# Patient Record
Sex: FEMALE | Race: WHITE | NOT HISPANIC OR LATINO | Employment: OTHER | URBAN - METROPOLITAN AREA
[De-identification: names, ages, dates, MRNs, and addresses within clinical notes are randomized per-mention and may not be internally consistent; named-entity substitution may affect disease eponyms.]

---

## 2019-05-28 DIAGNOSIS — Z01.812 PRE-OPERATIVE LABORATORY EXAMINATION: ICD-10-CM

## 2019-05-28 DIAGNOSIS — Z01.810 PRE-OPERATIVE CARDIOVASCULAR EXAMINATION: ICD-10-CM

## 2019-05-28 LAB
ANION GAP SERPL CALC-SCNC: 8 MMOL/L (ref 0–11.9)
APPEARANCE UR: CLEAR
APTT PPP: 26.7 SEC (ref 24.7–36)
BASOPHILS # BLD AUTO: 0.5 % (ref 0–1.8)
BASOPHILS # BLD: 0.04 K/UL (ref 0–0.12)
BILIRUB UR QL STRIP.AUTO: NEGATIVE
BUN SERPL-MCNC: 24 MG/DL (ref 8–22)
CALCIUM SERPL-MCNC: 9.4 MG/DL (ref 8.5–10.5)
CHLORIDE SERPL-SCNC: 108 MMOL/L (ref 96–112)
CO2 SERPL-SCNC: 27 MMOL/L (ref 20–33)
COLOR UR: YELLOW
CREAT SERPL-MCNC: 0.9 MG/DL (ref 0.5–1.4)
EKG IMPRESSION: NORMAL
EOSINOPHIL # BLD AUTO: 0.16 K/UL (ref 0–0.51)
EOSINOPHIL NFR BLD: 2.2 % (ref 0–6.9)
ERYTHROCYTE [DISTWIDTH] IN BLOOD BY AUTOMATED COUNT: 46.6 FL (ref 35.9–50)
GLUCOSE SERPL-MCNC: 101 MG/DL (ref 65–99)
GLUCOSE UR STRIP.AUTO-MCNC: NEGATIVE MG/DL
HCT VFR BLD AUTO: 36.5 % (ref 37–47)
HGB BLD-MCNC: 11.4 G/DL (ref 12–16)
IMM GRANULOCYTES # BLD AUTO: 0.03 K/UL (ref 0–0.11)
IMM GRANULOCYTES NFR BLD AUTO: 0.4 % (ref 0–0.9)
INR PPP: 0.93 (ref 0.87–1.13)
KETONES UR STRIP.AUTO-MCNC: NEGATIVE MG/DL
LEUKOCYTE ESTERASE UR QL STRIP.AUTO: NEGATIVE
LYMPHOCYTES # BLD AUTO: 1.43 K/UL (ref 1–4.8)
LYMPHOCYTES NFR BLD: 19.5 % (ref 22–41)
MCH RBC QN AUTO: 31.3 PG (ref 27–33)
MCHC RBC AUTO-ENTMCNC: 31.2 G/DL (ref 33.6–35)
MCV RBC AUTO: 100.3 FL (ref 81.4–97.8)
MICRO URNS: NORMAL
MONOCYTES # BLD AUTO: 0.4 K/UL (ref 0–0.85)
MONOCYTES NFR BLD AUTO: 5.5 % (ref 0–13.4)
NEUTROPHILS # BLD AUTO: 5.26 K/UL (ref 2–7.15)
NEUTROPHILS NFR BLD: 71.9 % (ref 44–72)
NITRITE UR QL STRIP.AUTO: NEGATIVE
NRBC # BLD AUTO: 0 K/UL
NRBC BLD-RTO: 0 /100 WBC
PH UR STRIP.AUTO: 5.5 [PH]
PLATELET # BLD AUTO: 365 K/UL (ref 164–446)
PMV BLD AUTO: 9 FL (ref 9–12.9)
POTASSIUM SERPL-SCNC: 4.1 MMOL/L (ref 3.6–5.5)
PROT UR QL STRIP: NEGATIVE MG/DL
PROTHROMBIN TIME: 12.6 SEC (ref 12–14.6)
RBC # BLD AUTO: 3.64 M/UL (ref 4.2–5.4)
RBC UR QL AUTO: NEGATIVE
SODIUM SERPL-SCNC: 143 MMOL/L (ref 135–145)
SP GR UR STRIP.AUTO: 1.02
UROBILINOGEN UR STRIP.AUTO-MCNC: 0.2 MG/DL
WBC # BLD AUTO: 7.3 K/UL (ref 4.8–10.8)

## 2019-05-28 PROCEDURE — 93005 ELECTROCARDIOGRAM TRACING: CPT

## 2019-05-28 PROCEDURE — 85610 PROTHROMBIN TIME: CPT

## 2019-05-28 PROCEDURE — 85730 THROMBOPLASTIN TIME PARTIAL: CPT

## 2019-05-28 PROCEDURE — 85025 COMPLETE CBC W/AUTO DIFF WBC: CPT

## 2019-05-28 PROCEDURE — 81003 URINALYSIS AUTO W/O SCOPE: CPT

## 2019-05-28 PROCEDURE — 93010 ELECTROCARDIOGRAM REPORT: CPT | Performed by: INTERNAL MEDICINE

## 2019-05-28 PROCEDURE — 80048 BASIC METABOLIC PNL TOTAL CA: CPT

## 2019-05-28 PROCEDURE — 36415 COLL VENOUS BLD VENIPUNCTURE: CPT

## 2019-05-28 RX ORDER — GABAPENTIN 300 MG/1
300 CAPSULE ORAL 4 TIMES DAILY
COMMUNITY

## 2019-05-28 RX ORDER — DICLOFENAC SODIUM 75 MG/1
75 TABLET, DELAYED RELEASE ORAL DAILY
Status: ON HOLD | COMMUNITY
End: 2019-06-10

## 2019-05-28 RX ORDER — ACYCLOVIR 400 MG/1
400 TABLET ORAL PRN
Status: ON HOLD | COMMUNITY
End: 2019-06-10

## 2019-05-28 RX ORDER — FERROUS SULFATE 325(65) MG
325 TABLET ORAL DAILY
COMMUNITY

## 2019-05-28 RX ORDER — SIMVASTATIN 40 MG
40 TABLET ORAL NIGHTLY
Status: ON HOLD | COMMUNITY
End: 2023-11-11

## 2019-05-28 RX ORDER — ACETAMINOPHEN 160 MG
1 TABLET,DISINTEGRATING ORAL DAILY
COMMUNITY

## 2019-05-28 RX ORDER — M-VIT,TX,IRON,MINS/CALC/FOLIC 27MG-0.4MG
1 TABLET ORAL DAILY
COMMUNITY

## 2019-05-28 RX ORDER — ACETAMINOPHEN 500 MG
500-1000 TABLET ORAL EVERY 6 HOURS PRN
COMMUNITY

## 2019-05-28 RX ORDER — LEVOTHYROXINE SODIUM 0.03 MG/1
25 TABLET ORAL EVERY EVENING
COMMUNITY

## 2019-06-07 ENCOUNTER — APPOINTMENT (OUTPATIENT)
Dept: RADIOLOGY | Facility: MEDICAL CENTER | Age: 74
DRG: 460 | End: 2019-06-07
Attending: NURSE PRACTITIONER
Payer: MEDICARE

## 2019-06-07 DIAGNOSIS — R26.89 SCISSOR GAIT: ICD-10-CM

## 2019-06-07 PROCEDURE — 72141 MRI NECK SPINE W/O DYE: CPT

## 2019-06-10 ENCOUNTER — APPOINTMENT (OUTPATIENT)
Dept: RADIOLOGY | Facility: MEDICAL CENTER | Age: 74
DRG: 460 | End: 2019-06-10
Attending: NEUROLOGICAL SURGERY
Payer: MEDICARE

## 2019-06-10 ENCOUNTER — ANESTHESIA EVENT (OUTPATIENT)
Dept: SURGERY | Facility: MEDICAL CENTER | Age: 74
DRG: 460 | End: 2019-06-10
Payer: MEDICARE

## 2019-06-10 ENCOUNTER — HOSPITAL ENCOUNTER (INPATIENT)
Facility: MEDICAL CENTER | Age: 74
LOS: 4 days | DRG: 460 | End: 2019-06-14
Attending: NEUROLOGICAL SURGERY | Admitting: NEUROLOGICAL SURGERY
Payer: MEDICARE

## 2019-06-10 ENCOUNTER — ANESTHESIA (OUTPATIENT)
Dept: SURGERY | Facility: MEDICAL CENTER | Age: 74
DRG: 460 | End: 2019-06-10
Payer: MEDICARE

## 2019-06-10 DIAGNOSIS — M48.062 LUMBAR STENOSIS WITH NEUROGENIC CLAUDICATION: ICD-10-CM

## 2019-06-10 PROCEDURE — 160035 HCHG PACU - 1ST 60 MINS PHASE I: Performed by: NEUROLOGICAL SURGERY

## 2019-06-10 PROCEDURE — 770001 HCHG ROOM/CARE - MED/SURG/GYN PRIV*

## 2019-06-10 PROCEDURE — 500002 HCHG ADHESIVE, DERMABOND: Performed by: NEUROLOGICAL SURGERY

## 2019-06-10 PROCEDURE — 700102 HCHG RX REV CODE 250 W/ 637 OVERRIDE(OP): Performed by: NURSE PRACTITIONER

## 2019-06-10 PROCEDURE — 500367 HCHG DRAIN KIT, HEMOVAC: Performed by: NEUROLOGICAL SURGERY

## 2019-06-10 PROCEDURE — A9270 NON-COVERED ITEM OR SERVICE: HCPCS | Performed by: ANESTHESIOLOGY

## 2019-06-10 PROCEDURE — 700111 HCHG RX REV CODE 636 W/ 250 OVERRIDE (IP): Performed by: NURSE PRACTITIONER

## 2019-06-10 PROCEDURE — A6402 STERILE GAUZE <= 16 SQ IN: HCPCS | Performed by: NEUROLOGICAL SURGERY

## 2019-06-10 PROCEDURE — 110371 HCHG SHELL REV 272: Performed by: NEUROLOGICAL SURGERY

## 2019-06-10 PROCEDURE — A9270 NON-COVERED ITEM OR SERVICE: HCPCS | Performed by: NURSE PRACTITIONER

## 2019-06-10 PROCEDURE — 700111 HCHG RX REV CODE 636 W/ 250 OVERRIDE (IP): Performed by: ANESTHESIOLOGY

## 2019-06-10 PROCEDURE — C1713 ANCHOR/SCREW BN/BN,TIS/BN: HCPCS | Performed by: NEUROLOGICAL SURGERY

## 2019-06-10 PROCEDURE — 160009 HCHG ANES TIME/MIN: Performed by: NEUROLOGICAL SURGERY

## 2019-06-10 PROCEDURE — 700101 HCHG RX REV CODE 250: Performed by: ANESTHESIOLOGY

## 2019-06-10 PROCEDURE — 700102 HCHG RX REV CODE 250 W/ 637 OVERRIDE(OP): Performed by: ANESTHESIOLOGY

## 2019-06-10 PROCEDURE — C1821 INTERSPINOUS IMPLANT: HCPCS | Performed by: NEUROLOGICAL SURGERY

## 2019-06-10 PROCEDURE — 160042 HCHG SURGERY MINUTES - EA ADDL 1 MIN LEVEL 5: Performed by: NEUROLOGICAL SURGERY

## 2019-06-10 PROCEDURE — 160031 HCHG SURGERY MINUTES - 1ST 30 MINS LEVEL 5: Performed by: NEUROLOGICAL SURGERY

## 2019-06-10 PROCEDURE — 72100 X-RAY EXAM L-S SPINE 2/3 VWS: CPT

## 2019-06-10 PROCEDURE — 700105 HCHG RX REV CODE 258: Performed by: NEUROLOGICAL SURGERY

## 2019-06-10 PROCEDURE — 160036 HCHG PACU - EA ADDL 30 MINS PHASE I: Performed by: NEUROLOGICAL SURGERY

## 2019-06-10 PROCEDURE — 500885 HCHG PACK, JACKSON TABLE: Performed by: NEUROLOGICAL SURGERY

## 2019-06-10 PROCEDURE — 0SG00AJ FUSION OF LUMBAR VERTEBRAL JOINT WITH INTERBODY FUSION DEVICE, POSTERIOR APPROACH, ANTERIOR COLUMN, OPEN APPROACH: ICD-10-PCS | Performed by: NEUROLOGICAL SURGERY

## 2019-06-10 PROCEDURE — 700112 HCHG RX REV CODE 229: Performed by: NURSE PRACTITIONER

## 2019-06-10 PROCEDURE — 160002 HCHG RECOVERY MINUTES (STAT): Performed by: NEUROLOGICAL SURGERY

## 2019-06-10 PROCEDURE — 501838 HCHG SUTURE GENERAL: Performed by: NEUROLOGICAL SURGERY

## 2019-06-10 PROCEDURE — 160048 HCHG OR STATISTICAL LEVEL 1-5: Performed by: NEUROLOGICAL SURGERY

## 2019-06-10 PROCEDURE — A4314 CATH W/DRAINAGE 2-WAY LATEX: HCPCS | Performed by: NEUROLOGICAL SURGERY

## 2019-06-10 PROCEDURE — 700101 HCHG RX REV CODE 250: Performed by: NURSE PRACTITIONER

## 2019-06-10 DEVICE — ROD PREBENT TITANIUM 5.5 X 35MM (2TCONX2=4): Type: IMPLANTABLE DEVICE | Site: SPINE LUMBAR | Status: FUNCTIONAL

## 2019-06-10 DEVICE — SCREW SOLERA SET SCREW (1TCX40+3TCX21+2TCX10=123): Type: IMPLANTABLE DEVICE | Site: SPINE LUMBAR | Status: FUNCTIONAL

## 2019-06-10 DEVICE — SPACER RISE 10X22MM 7-13MM (3TCONX2=6): Type: IMPLANTABLE DEVICE | Site: SPINE LUMBAR | Status: FUNCTIONAL

## 2019-06-10 DEVICE — SCREW MAS  SOLERA 6.5 X 55MM (1TCX8+3TCX8=32): Type: IMPLANTABLE DEVICE | Site: SPINE LUMBAR | Status: FUNCTIONAL

## 2019-06-10 DEVICE — MAGNIFUSE 1X5CM: Type: IMPLANTABLE DEVICE | Site: SPINE LUMBAR | Status: FUNCTIONAL

## 2019-06-10 RX ORDER — MAGNESIUM SULFATE HEPTAHYDRATE 40 MG/ML
INJECTION, SOLUTION INTRAVENOUS PRN
Status: DISCONTINUED | OUTPATIENT
Start: 2019-06-10 | End: 2019-06-10 | Stop reason: SURG

## 2019-06-10 RX ORDER — METOPROLOL TARTRATE 1 MG/ML
1 INJECTION, SOLUTION INTRAVENOUS
Status: DISCONTINUED | OUTPATIENT
Start: 2019-06-10 | End: 2019-06-10 | Stop reason: HOSPADM

## 2019-06-10 RX ORDER — M-VIT,TX,IRON,MINS/CALC/FOLIC 27MG-0.4MG
1 TABLET ORAL DAILY
Status: DISCONTINUED | OUTPATIENT
Start: 2019-06-10 | End: 2019-06-14 | Stop reason: HOSPADM

## 2019-06-10 RX ORDER — DIPHENHYDRAMINE HYDROCHLORIDE 50 MG/ML
12.5 INJECTION INTRAMUSCULAR; INTRAVENOUS
Status: DISCONTINUED | OUTPATIENT
Start: 2019-06-10 | End: 2019-06-10 | Stop reason: HOSPADM

## 2019-06-10 RX ORDER — CEFAZOLIN SODIUM 1 G/3ML
INJECTION, POWDER, FOR SOLUTION INTRAMUSCULAR; INTRAVENOUS PRN
Status: DISCONTINUED | OUTPATIENT
Start: 2019-06-10 | End: 2019-06-10 | Stop reason: SURG

## 2019-06-10 RX ORDER — SIMVASTATIN 20 MG
40 TABLET ORAL NIGHTLY
Status: DISCONTINUED | OUTPATIENT
Start: 2019-06-10 | End: 2019-06-14 | Stop reason: HOSPADM

## 2019-06-10 RX ORDER — DICLOFENAC SODIUM 75 MG/1
75 TABLET, DELAYED RELEASE ORAL 2 TIMES DAILY
Status: ON HOLD | COMMUNITY
End: 2019-06-14

## 2019-06-10 RX ORDER — HYDROMORPHONE HYDROCHLORIDE 1 MG/ML
0.1 INJECTION, SOLUTION INTRAMUSCULAR; INTRAVENOUS; SUBCUTANEOUS
Status: DISCONTINUED | OUTPATIENT
Start: 2019-06-10 | End: 2019-06-10 | Stop reason: HOSPADM

## 2019-06-10 RX ORDER — OXYCODONE HCL 5 MG/5 ML
5 SOLUTION, ORAL ORAL
Status: COMPLETED | OUTPATIENT
Start: 2019-06-10 | End: 2019-06-10

## 2019-06-10 RX ORDER — SODIUM CHLORIDE, SODIUM LACTATE, POTASSIUM CHLORIDE, CALCIUM CHLORIDE 600; 310; 30; 20 MG/100ML; MG/100ML; MG/100ML; MG/100ML
INJECTION, SOLUTION INTRAVENOUS CONTINUOUS
Status: DISCONTINUED | OUTPATIENT
Start: 2019-06-10 | End: 2019-06-10 | Stop reason: HOSPADM

## 2019-06-10 RX ORDER — LABETALOL HYDROCHLORIDE 5 MG/ML
10 INJECTION, SOLUTION INTRAVENOUS
Status: DISCONTINUED | OUTPATIENT
Start: 2019-06-10 | End: 2019-06-14 | Stop reason: HOSPADM

## 2019-06-10 RX ORDER — BUPIVACAINE HYDROCHLORIDE AND EPINEPHRINE 5; 5 MG/ML; UG/ML
INJECTION, SOLUTION EPIDURAL; INTRACAUDAL; PERINEURAL
Status: DISCONTINUED | OUTPATIENT
Start: 2019-06-10 | End: 2019-06-10 | Stop reason: HOSPADM

## 2019-06-10 RX ORDER — ACETAMINOPHEN 500 MG
1000 TABLET ORAL ONCE
Status: COMPLETED | OUTPATIENT
Start: 2019-06-10 | End: 2019-06-10

## 2019-06-10 RX ORDER — ONDANSETRON 2 MG/ML
4 INJECTION INTRAMUSCULAR; INTRAVENOUS EVERY 4 HOURS PRN
Status: DISCONTINUED | OUTPATIENT
Start: 2019-06-10 | End: 2019-06-14 | Stop reason: HOSPADM

## 2019-06-10 RX ORDER — ONDANSETRON 2 MG/ML
INJECTION INTRAMUSCULAR; INTRAVENOUS PRN
Status: DISCONTINUED | OUTPATIENT
Start: 2019-06-10 | End: 2019-06-10 | Stop reason: SURG

## 2019-06-10 RX ORDER — ONDANSETRON 4 MG/1
4 TABLET, ORALLY DISINTEGRATING ORAL EVERY 4 HOURS PRN
Status: DISCONTINUED | OUTPATIENT
Start: 2019-06-10 | End: 2019-06-14 | Stop reason: HOSPADM

## 2019-06-10 RX ORDER — AMOXICILLIN 250 MG
1 CAPSULE ORAL NIGHTLY
Status: DISCONTINUED | OUTPATIENT
Start: 2019-06-10 | End: 2019-06-14 | Stop reason: HOSPADM

## 2019-06-10 RX ORDER — DOCUSATE SODIUM 100 MG/1
100 CAPSULE, LIQUID FILLED ORAL 2 TIMES DAILY
Status: DISCONTINUED | OUTPATIENT
Start: 2019-06-10 | End: 2019-06-14 | Stop reason: HOSPADM

## 2019-06-10 RX ORDER — HYDROMORPHONE HYDROCHLORIDE 1 MG/ML
0.2 INJECTION, SOLUTION INTRAMUSCULAR; INTRAVENOUS; SUBCUTANEOUS
Status: DISCONTINUED | OUTPATIENT
Start: 2019-06-10 | End: 2019-06-10 | Stop reason: HOSPADM

## 2019-06-10 RX ORDER — GABAPENTIN 300 MG/1
300 CAPSULE ORAL 4 TIMES DAILY
Status: DISCONTINUED | OUTPATIENT
Start: 2019-06-10 | End: 2019-06-14 | Stop reason: HOSPADM

## 2019-06-10 RX ORDER — MEPERIDINE HYDROCHLORIDE 25 MG/ML
12.5 INJECTION INTRAMUSCULAR; INTRAVENOUS; SUBCUTANEOUS
Status: DISCONTINUED | OUTPATIENT
Start: 2019-06-10 | End: 2019-06-10 | Stop reason: HOSPADM

## 2019-06-10 RX ORDER — SODIUM CHLORIDE AND POTASSIUM CHLORIDE 150; 900 MG/100ML; MG/100ML
INJECTION, SOLUTION INTRAVENOUS CONTINUOUS
Status: DISCONTINUED | OUTPATIENT
Start: 2019-06-10 | End: 2019-06-14 | Stop reason: HOSPADM

## 2019-06-10 RX ORDER — DIPHENHYDRAMINE HCL 25 MG
25 TABLET ORAL EVERY 6 HOURS PRN
Status: DISCONTINUED | OUTPATIENT
Start: 2019-06-10 | End: 2019-06-14 | Stop reason: HOSPADM

## 2019-06-10 RX ORDER — TIZANIDINE 4 MG/1
2 TABLET ORAL 3 TIMES DAILY PRN
Status: DISCONTINUED | OUTPATIENT
Start: 2019-06-10 | End: 2019-06-14 | Stop reason: HOSPADM

## 2019-06-10 RX ORDER — LEVOTHYROXINE SODIUM 0.03 MG/1
25 TABLET ORAL EVERY EVENING
Status: DISCONTINUED | OUTPATIENT
Start: 2019-06-10 | End: 2019-06-14 | Stop reason: HOSPADM

## 2019-06-10 RX ORDER — HALOPERIDOL 5 MG/ML
1 INJECTION INTRAMUSCULAR
Status: DISCONTINUED | OUTPATIENT
Start: 2019-06-10 | End: 2019-06-10 | Stop reason: HOSPADM

## 2019-06-10 RX ORDER — CEFAZOLIN SODIUM 2 G/100ML
2 INJECTION, SOLUTION INTRAVENOUS EVERY 8 HOURS
Status: COMPLETED | OUTPATIENT
Start: 2019-06-10 | End: 2019-06-11

## 2019-06-10 RX ORDER — FERROUS SULFATE 325(65) MG
325 TABLET ORAL DAILY
Status: DISCONTINUED | OUTPATIENT
Start: 2019-06-10 | End: 2019-06-14 | Stop reason: HOSPADM

## 2019-06-10 RX ORDER — SODIUM CHLORIDE, SODIUM LACTATE, POTASSIUM CHLORIDE, CALCIUM CHLORIDE 600; 310; 30; 20 MG/100ML; MG/100ML; MG/100ML; MG/100ML
INJECTION, SOLUTION INTRAVENOUS CONTINUOUS
Status: ACTIVE | OUTPATIENT
Start: 2019-06-10 | End: 2019-06-10

## 2019-06-10 RX ORDER — POLYETHYLENE GLYCOL 3350 17 G/17G
1 POWDER, FOR SOLUTION ORAL 2 TIMES DAILY PRN
Status: DISCONTINUED | OUTPATIENT
Start: 2019-06-10 | End: 2019-06-14 | Stop reason: HOSPADM

## 2019-06-10 RX ORDER — NEOSTIGMINE METHYLSULFATE 1 MG/ML
INJECTION, SOLUTION INTRAVENOUS PRN
Status: DISCONTINUED | OUTPATIENT
Start: 2019-06-10 | End: 2019-06-10 | Stop reason: SURG

## 2019-06-10 RX ORDER — HYDROMORPHONE HYDROCHLORIDE 2 MG/ML
INJECTION, SOLUTION INTRAMUSCULAR; INTRAVENOUS; SUBCUTANEOUS PRN
Status: DISCONTINUED | OUTPATIENT
Start: 2019-06-10 | End: 2019-06-10 | Stop reason: SURG

## 2019-06-10 RX ORDER — BACITRACIN 50000 [IU]/1
INJECTION, POWDER, FOR SOLUTION INTRAMUSCULAR
Status: DISCONTINUED | OUTPATIENT
Start: 2019-06-10 | End: 2019-06-10 | Stop reason: HOSPADM

## 2019-06-10 RX ORDER — HYDRALAZINE HYDROCHLORIDE 20 MG/ML
10 INJECTION INTRAMUSCULAR; INTRAVENOUS
Status: DISCONTINUED | OUTPATIENT
Start: 2019-06-10 | End: 2019-06-14 | Stop reason: HOSPADM

## 2019-06-10 RX ORDER — VANCOMYCIN HCL 900 MCG/MG
POWDER (GRAM) MISCELLANEOUS
Status: DISCONTINUED | OUTPATIENT
Start: 2019-06-10 | End: 2019-06-10 | Stop reason: HOSPADM

## 2019-06-10 RX ORDER — BISACODYL 10 MG
10 SUPPOSITORY, RECTAL RECTAL
Status: DISCONTINUED | OUTPATIENT
Start: 2019-06-10 | End: 2019-06-14 | Stop reason: HOSPADM

## 2019-06-10 RX ORDER — AMOXICILLIN 250 MG
1 CAPSULE ORAL
Status: DISCONTINUED | OUTPATIENT
Start: 2019-06-10 | End: 2019-06-14 | Stop reason: HOSPADM

## 2019-06-10 RX ORDER — MIDAZOLAM HYDROCHLORIDE 1 MG/ML
1 INJECTION INTRAMUSCULAR; INTRAVENOUS
Status: DISCONTINUED | OUTPATIENT
Start: 2019-06-10 | End: 2019-06-10 | Stop reason: HOSPADM

## 2019-06-10 RX ORDER — DEXAMETHASONE SODIUM PHOSPHATE 4 MG/ML
INJECTION, SOLUTION INTRA-ARTICULAR; INTRALESIONAL; INTRAMUSCULAR; INTRAVENOUS; SOFT TISSUE PRN
Status: DISCONTINUED | OUTPATIENT
Start: 2019-06-10 | End: 2019-06-10 | Stop reason: SURG

## 2019-06-10 RX ORDER — ONDANSETRON 2 MG/ML
4 INJECTION INTRAMUSCULAR; INTRAVENOUS
Status: DISCONTINUED | OUTPATIENT
Start: 2019-06-10 | End: 2019-06-10 | Stop reason: HOSPADM

## 2019-06-10 RX ORDER — MORPHINE SULFATE 4 MG/ML
2 INJECTION, SOLUTION INTRAMUSCULAR; INTRAVENOUS ONCE
Status: DISPENSED | OUTPATIENT
Start: 2019-06-10 | End: 2019-06-11

## 2019-06-10 RX ORDER — ENEMA 19; 7 G/133ML; G/133ML
1 ENEMA RECTAL
Status: DISCONTINUED | OUTPATIENT
Start: 2019-06-10 | End: 2019-06-14 | Stop reason: HOSPADM

## 2019-06-10 RX ORDER — OXYCODONE HCL 5 MG/5 ML
10 SOLUTION, ORAL ORAL
Status: COMPLETED | OUTPATIENT
Start: 2019-06-10 | End: 2019-06-10

## 2019-06-10 RX ORDER — HYDROMORPHONE HYDROCHLORIDE 1 MG/ML
0.4 INJECTION, SOLUTION INTRAMUSCULAR; INTRAVENOUS; SUBCUTANEOUS
Status: DISCONTINUED | OUTPATIENT
Start: 2019-06-10 | End: 2019-06-10 | Stop reason: HOSPADM

## 2019-06-10 RX ORDER — MORPHINE SULFATE 4 MG/ML
2 INJECTION, SOLUTION INTRAMUSCULAR; INTRAVENOUS
Status: COMPLETED | OUTPATIENT
Start: 2019-06-10 | End: 2019-06-10

## 2019-06-10 RX ORDER — DIPHENHYDRAMINE HYDROCHLORIDE 50 MG/ML
25 INJECTION INTRAMUSCULAR; INTRAVENOUS EVERY 6 HOURS PRN
Status: DISCONTINUED | OUTPATIENT
Start: 2019-06-10 | End: 2019-06-14 | Stop reason: HOSPADM

## 2019-06-10 RX ORDER — ACETAMINOPHEN 500 MG
500-1000 TABLET ORAL EVERY 6 HOURS PRN
Status: DISCONTINUED | OUTPATIENT
Start: 2019-06-10 | End: 2019-06-14 | Stop reason: HOSPADM

## 2019-06-10 RX ADMIN — PROPOFOL 160 MCG/KG/MIN: 10 INJECTION, EMULSION INTRAVENOUS at 07:21

## 2019-06-10 RX ADMIN — SIMVASTATIN 40 MG: 20 TABLET, FILM COATED ORAL at 21:48

## 2019-06-10 RX ADMIN — HYDROMORPHONE HYDROCHLORIDE 0.4 MG: 1 INJECTION, SOLUTION INTRAMUSCULAR; INTRAVENOUS; SUBCUTANEOUS at 10:50

## 2019-06-10 RX ADMIN — NEOSTIGMINE METHYLSULFATE 3 MG: 1 INJECTION INTRAVENOUS at 09:52

## 2019-06-10 RX ADMIN — ONDANSETRON 4 MG: 2 INJECTION INTRAMUSCULAR; INTRAVENOUS at 09:10

## 2019-06-10 RX ADMIN — SODIUM CHLORIDE, POTASSIUM CHLORIDE, SODIUM LACTATE AND CALCIUM CHLORIDE: 600; 310; 30; 20 INJECTION, SOLUTION INTRAVENOUS at 06:28

## 2019-06-10 RX ADMIN — HYDROMORPHONE HYDROCHLORIDE 1 MG: 2 INJECTION, SOLUTION INTRAMUSCULAR; INTRAVENOUS; SUBCUTANEOUS at 09:00

## 2019-06-10 RX ADMIN — FERROUS SULFATE TAB 325 MG (65 MG ELEMENTAL FE) 325 MG: 325 (65 FE) TAB at 16:05

## 2019-06-10 RX ADMIN — DOCUSATE SODIUM 100 MG: 100 CAPSULE, LIQUID FILLED ORAL at 16:06

## 2019-06-10 RX ADMIN — MORPHINE SULFATE: 50 INJECTION, SOLUTION, CONCENTRATE INTRAVENOUS at 17:14

## 2019-06-10 RX ADMIN — SENNOSIDES,DOCUSATE SODIUM 1 TABLET: 8.6; 5 TABLET, FILM COATED ORAL at 21:49

## 2019-06-10 RX ADMIN — HYDROMORPHONE HYDROCHLORIDE 0.2 MG: 1 INJECTION, SOLUTION INTRAMUSCULAR; INTRAVENOUS; SUBCUTANEOUS at 11:00

## 2019-06-10 RX ADMIN — MULTIPLE VITAMINS W/ MINERALS TAB 1 TABLET: TAB at 16:05

## 2019-06-10 RX ADMIN — LEVOTHYROXINE SODIUM 25 MCG: 25 TABLET ORAL at 16:06

## 2019-06-10 RX ADMIN — HYDROMORPHONE HYDROCHLORIDE 1 MG: 2 INJECTION, SOLUTION INTRAMUSCULAR; INTRAVENOUS; SUBCUTANEOUS at 07:15

## 2019-06-10 RX ADMIN — PROPOFOL 20 MG: 10 INJECTION, EMULSION INTRAVENOUS at 09:10

## 2019-06-10 RX ADMIN — HYDROMORPHONE HYDROCHLORIDE 0.4 MG: 1 INJECTION, SOLUTION INTRAMUSCULAR; INTRAVENOUS; SUBCUTANEOUS at 10:35

## 2019-06-10 RX ADMIN — CEFAZOLIN 2 G: 330 INJECTION, POWDER, FOR SOLUTION INTRAMUSCULAR; INTRAVENOUS at 07:11

## 2019-06-10 RX ADMIN — ROCURONIUM BROMIDE 40 MG: 10 INJECTION, SOLUTION INTRAVENOUS at 07:15

## 2019-06-10 RX ADMIN — POTASSIUM CHLORIDE AND SODIUM CHLORIDE: 900; 150 INJECTION, SOLUTION INTRAVENOUS at 16:03

## 2019-06-10 RX ADMIN — FENTANYL CITRATE 50 MCG: 50 INJECTION INTRAMUSCULAR; INTRAVENOUS at 10:30

## 2019-06-10 RX ADMIN — PROPOFOL 120 MG: 10 INJECTION, EMULSION INTRAVENOUS at 07:15

## 2019-06-10 RX ADMIN — MAGNESIUM SULFATE IN WATER 4 G: 40 INJECTION, SOLUTION INTRAVENOUS at 07:42

## 2019-06-10 RX ADMIN — CEFAZOLIN SODIUM 2 G: 2 INJECTION, SOLUTION INTRAVENOUS at 16:03

## 2019-06-10 RX ADMIN — ACETAMINOPHEN 1000 MG: 500 TABLET ORAL at 10:40

## 2019-06-10 RX ADMIN — SODIUM CHLORIDE, POTASSIUM CHLORIDE, SODIUM LACTATE AND CALCIUM CHLORIDE: 600; 310; 30; 20 INJECTION, SOLUTION INTRAVENOUS at 07:11

## 2019-06-10 RX ADMIN — GABAPENTIN 300 MG: 300 CAPSULE ORAL at 16:05

## 2019-06-10 RX ADMIN — GABAPENTIN 300 MG: 300 CAPSULE ORAL at 21:48

## 2019-06-10 RX ADMIN — OXYCODONE HYDROCHLORIDE 10 MG: 5 SOLUTION ORAL at 10:37

## 2019-06-10 RX ADMIN — VITAMIN D, TAB 1000IU (100/BT) 2000 UNITS: 25 TAB at 16:06

## 2019-06-10 RX ADMIN — MORPHINE SULFATE 2 MG: 4 INJECTION INTRAVENOUS at 15:59

## 2019-06-10 RX ADMIN — GLYCOPYRROLATE 0.8 MG: 0.2 INJECTION INTRAMUSCULAR; INTRAVENOUS at 09:52

## 2019-06-10 RX ADMIN — SODIUM CHLORIDE, POTASSIUM CHLORIDE, SODIUM LACTATE AND CALCIUM CHLORIDE: 600; 310; 30; 20 INJECTION, SOLUTION INTRAVENOUS at 08:20

## 2019-06-10 RX ADMIN — FENTANYL CITRATE 50 MCG: 50 INJECTION INTRAMUSCULAR; INTRAVENOUS at 10:20

## 2019-06-10 RX ADMIN — DEXAMETHASONE SODIUM PHOSPHATE 4 MG: 4 INJECTION, SOLUTION INTRA-ARTICULAR; INTRALESIONAL; INTRAMUSCULAR; INTRAVENOUS; SOFT TISSUE at 07:40

## 2019-06-10 ASSESSMENT — COGNITIVE AND FUNCTIONAL STATUS - GENERAL
TURNING FROM BACK TO SIDE WHILE IN FLAT BAD: A LITTLE
SUGGESTED CMS G CODE MODIFIER MOBILITY: CK
DAILY ACTIVITIY SCORE: 19
SUGGESTED CMS G CODE MODIFIER DAILY ACTIVITY: CK
MOBILITY SCORE: 17
MOVING TO AND FROM BED TO CHAIR: A LITTLE
HELP NEEDED FOR BATHING: A LITTLE
CLIMB 3 TO 5 STEPS WITH RAILING: A LOT
TOILETING: A LITTLE
DRESSING REGULAR LOWER BODY CLOTHING: A LOT
STANDING UP FROM CHAIR USING ARMS: A LITTLE
DRESSING REGULAR UPPER BODY CLOTHING: A LITTLE
WALKING IN HOSPITAL ROOM: A LITTLE
MOVING FROM LYING ON BACK TO SITTING ON SIDE OF FLAT BED: A LITTLE

## 2019-06-10 ASSESSMENT — PAIN SCALES - GENERAL: PAIN_LEVEL: 3

## 2019-06-10 ASSESSMENT — LIFESTYLE VARIABLES
CONSUMPTION TOTAL: NEGATIVE
HAVE YOU EVER FELT YOU SHOULD CUT DOWN ON YOUR DRINKING: YES
ALCOHOL_USE: YES
TOTAL SCORE: 1
ON A TYPICAL DAY WHEN YOU DRINK ALCOHOL HOW MANY DRINKS DO YOU HAVE: 1
EVER HAD A DRINK FIRST THING IN THE MORNING TO STEADY YOUR NERVES TO GET RID OF A HANGOVER: NO
EVER FELT BAD OR GUILTY ABOUT YOUR DRINKING: NO
HOW MANY TIMES IN THE PAST YEAR HAVE YOU HAD 5 OR MORE DRINKS IN A DAY: 0
HAVE PEOPLE ANNOYED YOU BY CRITICIZING YOUR DRINKING: NO
AVERAGE NUMBER OF DAYS PER WEEK YOU HAVE A DRINK CONTAINING ALCOHOL: 7
TOTAL SCORE: 1
TOTAL SCORE: 1

## 2019-06-10 ASSESSMENT — PATIENT HEALTH QUESTIONNAIRE - PHQ9
SUM OF ALL RESPONSES TO PHQ9 QUESTIONS 1 AND 2: 0
1. LITTLE INTEREST OR PLEASURE IN DOING THINGS: NOT AT ALL
2. FEELING DOWN, DEPRESSED, IRRITABLE, OR HOPELESS: NOT AT ALL

## 2019-06-10 NOTE — ANESTHESIA QCDR
2019 [facility] Clinical Data Registry (for Quality Improvement)     Postoperative nausea/vomiting risk protocol (Adult = 18 yrs and Pediatric 3-17 yrs)- (430 and 463)  General inhalation anesthetic (NOT TIVA) with PONV risk factors: No  Provision of anti-emetic therapy with at least 2 different classes of agents: N/A  Patient DID NOT receive anti-emetic therapy and reason is documented in Medical Record: N/A    Multimodal Pain Management- (AQI59)  Patient undergoing Elective Surgery (i.e. Outpatient, or ASC, or Prescheduled Surgery prior to Hospital Admission): Yes  Use of Multimodal Pain Management, two or more drugs and/or interventions, NOT including systemic opioids: Yes   Exception: Documented allergy to multiple classes of analgesics:  N/A    PACU assessment of acute postoperative pain prior to Anesthesia Care End- Applies to Patients Age = 18- (ABG7)  Initial PACU pain score is which of the following: < 7/10  Patient unable to report pain score: N/A    Post-anesthetic transfer of care checklist/protocol to PACU/ICU- (426 and 427)  Upon conclusion of case, patient transferred to which of the following locations: PACU/Non-ICU  Use of transfer checklist/protocol: Yes  Exclusion: Service Performed in Patient Hospital Room (and thus did not require transfer): N/A    PACU Reintubation- (AQI31)  General anesthesia requiring endotracheal intubation (ETT) along with subsequent extubation in OR or PACU: Yes  Required reintubation in the PACU: No   Extubation was a planned trial documented in the medical record prior to removal of the original airway device:  N/A    Unplanned admission to ICU related to anesthesia service up through end of PACU care- (MD51)  Unplanned admission to ICU (not initially anticipated at anesthesia start time): No

## 2019-06-10 NOTE — ANESTHESIA PROCEDURE NOTES
Airway  Date/Time: 6/10/2019 7:17 AM  Performed by: SUYL VANN  Authorized by: SULY VANN     Location:  OR  Urgency:  Elective  Indications for Airway Management:  Anesthesia  Spontaneous Ventilation: absent    Sedation Level:  Deep  Preoxygenated: Yes    Patient Position:  Sniffing  Final Airway Type:  Endotracheal airway  Final Endotracheal Airway:  ETT  Cuffed: Yes    Technique Used for Successful ETT Placement:  Direct laryngoscopy  Insertion Site:  Oral  Blade Type:  Alberto  Laryngoscope Blade/Videolaryngoscope Blade Size:  3  ETT Size (mm):  6.5  Measured from:  Teeth  Placement Verified by: auscultation and capnometry    Cormack-Lehane Classification:  Grade I - full view of glottis  Number of Attempts at Approach:  1

## 2019-06-10 NOTE — ANESTHESIA POSTPROCEDURE EVALUATION
Patient: Rubi Haq    Procedure Summary     Date:  06/10/19 Room / Location:  [facility] OR 05 / SURGERY [facility]    Anesthesia Start:  0711 Anesthesia Stop:  1004    Procedures:       FUSION, SPINE, LUMBAR, TLIF L4-5 (Spine Lumbar)      LAMINECTOMY, SPINE, LUMBAR, WITH DISCECTOMY- REDO (Spine Lumbar) Diagnosis:  (DEGENERATIVE SPONDYLOLISTHESIS)    Surgeon:  Amado Marquez M.D. Responsible Provider:  Tobias Ventura M.D.    Anesthesia Type:  general ASA Status:  2          Final Anesthesia Type: general  Last vitals  BP   Blood Pressure : 114/72    Temp   36.5 °C (97.7 °F)    Pulse   Pulse: 74   Resp   17    SpO2   97 %      Anesthesia Post Evaluation    Patient location during evaluation: PACU  Patient participation: complete - patient participated  Level of consciousness: awake and alert  Pain score: 3    Airway patency: patent  Anesthetic complications: no  Cardiovascular status: hemodynamically stable  Respiratory status: acceptable  Hydration status: euvolemic    PONV: none           Nurse Pain Score: 3 (NPRS)

## 2019-06-10 NOTE — PROGRESS NOTES
Pt arrived to floor w/ transport. A/Ox4. Reports numbness in her RLE that was present prior to surgery but is improving. Sx site dressed, dressing C/D/I, hemovac present and compressed. PIV present and infusing. Brace ordered,  stating he will bring it in from the car to be at bedside. Arshad present, skin intact. No complaints at this time, call light within reach.

## 2019-06-10 NOTE — ANESTHESIA TIME REPORT
Anesthesia Start and Stop Event Times     Date Time Event    6/10/2019 0711 Anesthesia Start     1004 Anesthesia Stop        Responsible Staff  06/10/19    Name Role Begin End    Tobias Ventura M.D. Anesth 0711 1004        Preop Diagnosis (Free Text):  Pre-op Diagnosis     DEGENERATIVE SPONDYLOLISTHESIS        Preop Diagnosis (Codes):  Diagnosis Information     Diagnosis Code(s):         Post op Diagnosis  Spinal stenosis      Premium Reason  Non-Premium    Comments:

## 2019-06-10 NOTE — OR NURSING
Pre-op complete. POA paperwork scanned into chart. Patient and family updated on plan of care. All questions answered at this time.  Call light within reach, advised to call for any questions or concerns. Hourly rounding in place.

## 2019-06-10 NOTE — OR SURGEON
Immediate Post OP Note    PreOp Diagnosis: lumbar stenosis     PostOp Diagnosis: lumbar stenosis     Procedure(s):  FUSION, SPINE, LUMBAR, TLIF L4-5 - Wound Class: Clean with Drain  LAMINECTOMY, SPINE, LUMBAR, WITH DISCECTOMY- REDO - Wound Class: Clean with Drain    Surgeon(s):  Amado Marquez M.D.    Anesthesiologist/Type of Anesthesia:  Anesthesiologist: Tobias Ventura M.D./General    Surgical Staff:  Assistant: LEONID Flowers  Circulator: Randa Erazo R.N.  Scrub Person: Katie Salazar  Radiology Technologist: Alisa Cardoza    Specimens removed if any:  * No specimens in log *    Estimated Blood Loss: 75 cc    Findings: good decompression, good hardware placement     Complications: none         6/10/2019 9:54 AM LEONID Flowers

## 2019-06-10 NOTE — OP REPORT
DATE OF SERVICE:  06/10/2019    PREOPERATIVE DIAGNOSIS:  Lumbar 4-5 spondylolisthesis with motor and sensory   radiculopathy recalcitrant to nonoperative measures.    POSTOPERATIVE DIAGNOSIS:  Lumbar 4-5 spondylolisthesis with motor and sensory   radiculopathy recalcitrant to nonoperative measures.    PROCEDURES PERFORMED:  1.  Redo laminectomy, lumbar 4-5 with total facetectomy bilaterally.  2.  Bilateral neural foraminotomies, lumbar 4 and 5 roots.  3.  Right-sided diskectomy at lumbar 4-5 with interbody arthrodesis.  4.  Implantation of Globus expandable cage at lumbar 4-5.  5.  Use of locally harvested morselized autograft.  6.  Use of allograft.  7.  Posterolateral arthrodesis, lumbar 4-5.  8.  Pedicle screw instrumentation, lumbar 4 and 5 using Medtronic Solera   screws.  9.  Open reduction of spondylolisthesis.  10.  Use of fluoroscopic guidance for pedicle screw placement.    SURGEON:  Amado Marquez MD    ASSISTANT:  LY Flowers    ANESTHESIA:  General.    COMPLICATIONS:  None.    ESTIMATED BLOOD LOSS:  75 mL    DESCRIPTION OF PROCEDURE:  Patient was brought to the operating room,   identified in the usual fashion.  General endotracheal anesthesia was induced   by the anesthesia team.  Patient was then placed prone on the Iraj table   with bolsters.  All pressure points were meticulously padded.  Midline lumbar   incision from her previous surgery was marked on the skin.  Fluoroscopic   guidance confirmed it was in the correct location.  We then prepped and draped   the patient in the usual sterile fashion.  Local anesthesia was infiltrated   in the subcutaneous tissue.  A 10 blade was used to incise the skin.    Dissection was carried down in a subperiosteal fashion bilaterally.    Retractors were put in place.  The previous lumbar 5 laminectomy was   identified.  We identified the transverse processes of lumbar 4 and 5 and the   lumbar 4-5 joint.  A film was taken with a Kocher on  the transverse process of   what we believed to be lumbar 4.  We were at the correct level, so this was   then marked with a marking pen blue.  We then completed the dissection and   then performed a standard laminectomy and total facetectomy using a   combination of Leksell rongeur, high-speed air drill, Kerrison 2, 3, and 4   punches.  We identified the lumbar 4 roots bilaterally and had them nice and   free.  We then used a different retractor to retract the thecal sac medially   from the right side.  Bipolar electrocautery and microscissors were used to   cut the epidural veins.  We then used a 15 blade to incise the disk space.  We   then removed disk contents using a combination of alley and a pituitary and   progressively larger diamond and then box curettes to prepare the endplates.    Locally harvested morselized autograft was inserted into the disk space and   gently countersunk using a RECCY dental.  We then implanted a 7-13 mm Globus   expandable cage.  A film was taken confirming that we were at the correct   level and the cage was in good location, so we then expanded it to 13 mm   without incident.  Again, film was taken confirming that it was in good   location.  We got partial reduction of the spondylolisthesis with this.  We   then turned our attention to placing pedicle screws.  High-speed air drill was   used to drill a  hole.  A 2.8 mm drill was used to cannulate the   pedicle.  Olivia confirmed we had no breach.  A 6.0 mm tap was then used to   tap the pedicle to the appropriate depth.  Olivia confirmed we had no breach.    We then placed 6.5x55 mm screws at all levels.  There were no complications.    There were no breaches.  We then placed the appropriately sized rods and   nuts at lumbar 5.  We final tightened them at lumbar 5 and then left the cuca   proud to the lumbar 4 screws.  We then used dual reducers to reduce the rods   down to the screws at lumbar 4, which gave us the  remaining reduction of our   spondylolisthesis.  Nuts were placed and we final tightened with compression   on the left side.  Again, film was taken confirming that we were at the   correct levels.  The hardware looked to be in excellent position.  We then   decorticated the transverse processes using a high-speed air drill.  We then   packed locally harvested morselized autograft and allograft into the gutters   bilaterally.  We left vancomycin powder on top of the hardware.  We left a   Hemovac drain, brought out through a separate stab incision.  We then closed   the incision in layers and topped with Dermabond and Telfa and Medipore tape.    There were no complications.  The patient tolerated the procedure well and   was transferred to the recovery room in stable condition.       ____________________________________     RAMIRO NINA MD    CPD / NTS    DD:  06/10/2019 10:50:26  DT:  06/10/2019 11:03:36    D#:  [id number]  Job#:  [id number]

## 2019-06-10 NOTE — OR NURSING
Await for a room.V/S WNL as per baseline and Latricia score criterias,BP checks decreased to Q 1hr.Family updated.

## 2019-06-10 NOTE — ANESTHESIA PREPROCEDURE EVALUATION
Relevant Problems   No relevant active problems       Physical Exam    Airway   Mallampati: II  TM distance: >3 FB  Neck ROM: full       Cardiovascular - normal exam  Rhythm: regular  Rate: normal  (-) murmur     Dental - normal exam         Pulmonary - normal exam  Breath sounds clear to auscultation     Abdominal    Neurological - normal exam                 Anesthesia Plan    ASA 2       Plan - general                       Informed Consent:        prone  lymphedema

## 2019-06-11 LAB
ANION GAP SERPL CALC-SCNC: 8 MMOL/L (ref 0–11.9)
BUN SERPL-MCNC: 20 MG/DL (ref 8–22)
CALCIUM SERPL-MCNC: 8.4 MG/DL (ref 8.5–10.5)
CHLORIDE SERPL-SCNC: 107 MMOL/L (ref 96–112)
CO2 SERPL-SCNC: 25 MMOL/L (ref 20–33)
CREAT SERPL-MCNC: 0.82 MG/DL (ref 0.5–1.4)
ERYTHROCYTE [DISTWIDTH] IN BLOOD BY AUTOMATED COUNT: 50.7 FL (ref 35.9–50)
GLUCOSE SERPL-MCNC: 130 MG/DL (ref 65–99)
HCT VFR BLD AUTO: 32 % (ref 37–47)
HGB BLD-MCNC: 10.1 G/DL (ref 12–16)
MCH RBC QN AUTO: 31.9 PG (ref 27–33)
MCHC RBC AUTO-ENTMCNC: 31.6 G/DL (ref 33.6–35)
MCV RBC AUTO: 100.9 FL (ref 81.4–97.8)
PLATELET # BLD AUTO: 226 K/UL (ref 164–446)
PMV BLD AUTO: 9.2 FL (ref 9–12.9)
POTASSIUM SERPL-SCNC: 4.9 MMOL/L (ref 3.6–5.5)
RBC # BLD AUTO: 3.17 M/UL (ref 4.2–5.4)
SODIUM SERPL-SCNC: 140 MMOL/L (ref 135–145)
WBC # BLD AUTO: 13.1 K/UL (ref 4.8–10.8)

## 2019-06-11 PROCEDURE — 700101 HCHG RX REV CODE 250: Performed by: NURSE PRACTITIONER

## 2019-06-11 PROCEDURE — 85027 COMPLETE CBC AUTOMATED: CPT

## 2019-06-11 PROCEDURE — 700112 HCHG RX REV CODE 229: Performed by: NURSE PRACTITIONER

## 2019-06-11 PROCEDURE — A9270 NON-COVERED ITEM OR SERVICE: HCPCS | Performed by: NURSE PRACTITIONER

## 2019-06-11 PROCEDURE — 97535 SELF CARE MNGMENT TRAINING: CPT

## 2019-06-11 PROCEDURE — 80048 BASIC METABOLIC PNL TOTAL CA: CPT

## 2019-06-11 PROCEDURE — 700111 HCHG RX REV CODE 636 W/ 250 OVERRIDE (IP): Performed by: NURSE PRACTITIONER

## 2019-06-11 PROCEDURE — 700102 HCHG RX REV CODE 250 W/ 637 OVERRIDE(OP): Performed by: NURSE PRACTITIONER

## 2019-06-11 PROCEDURE — 36415 COLL VENOUS BLD VENIPUNCTURE: CPT

## 2019-06-11 PROCEDURE — 97165 OT EVAL LOW COMPLEX 30 MIN: CPT

## 2019-06-11 PROCEDURE — 770001 HCHG ROOM/CARE - MED/SURG/GYN PRIV*

## 2019-06-11 PROCEDURE — 97161 PT EVAL LOW COMPLEX 20 MIN: CPT

## 2019-06-11 RX ADMIN — CEFAZOLIN SODIUM 2 G: 2 INJECTION, SOLUTION INTRAVENOUS at 00:24

## 2019-06-11 RX ADMIN — GABAPENTIN 300 MG: 300 CAPSULE ORAL at 14:28

## 2019-06-11 RX ADMIN — GABAPENTIN 300 MG: 300 CAPSULE ORAL at 18:19

## 2019-06-11 RX ADMIN — DOCUSATE SODIUM 100 MG: 100 CAPSULE, LIQUID FILLED ORAL at 05:02

## 2019-06-11 RX ADMIN — TIZANIDINE 2 MG: 4 TABLET ORAL at 09:40

## 2019-06-11 RX ADMIN — VITAMIN D, TAB 1000IU (100/BT) 2000 UNITS: 25 TAB at 05:01

## 2019-06-11 RX ADMIN — POTASSIUM CHLORIDE AND SODIUM CHLORIDE: 900; 150 INJECTION, SOLUTION INTRAVENOUS at 02:50

## 2019-06-11 RX ADMIN — FERROUS SULFATE TAB 325 MG (65 MG ELEMENTAL FE) 325 MG: 325 (65 FE) TAB at 05:01

## 2019-06-11 RX ADMIN — LEVOTHYROXINE SODIUM 25 MCG: 25 TABLET ORAL at 18:19

## 2019-06-11 RX ADMIN — GABAPENTIN 300 MG: 300 CAPSULE ORAL at 09:40

## 2019-06-11 RX ADMIN — MULTIPLE VITAMINS W/ MINERALS TAB 1 TABLET: TAB at 05:01

## 2019-06-11 RX ADMIN — MAGNESIUM HYDROXIDE 30 ML: 400 SUSPENSION ORAL at 09:40

## 2019-06-11 RX ADMIN — TIZANIDINE 2 MG: 4 TABLET ORAL at 14:28

## 2019-06-11 RX ADMIN — SIMVASTATIN 40 MG: 20 TABLET, FILM COATED ORAL at 20:34

## 2019-06-11 RX ADMIN — SENNOSIDES,DOCUSATE SODIUM 1 TABLET: 8.6; 5 TABLET, FILM COATED ORAL at 20:31

## 2019-06-11 RX ADMIN — DOCUSATE SODIUM 100 MG: 100 CAPSULE, LIQUID FILLED ORAL at 18:18

## 2019-06-11 RX ADMIN — POLYETHYLENE GLYCOL 3350 1 PACKET: 17 POWDER, FOR SOLUTION ORAL at 18:23

## 2019-06-11 RX ADMIN — GABAPENTIN 300 MG: 300 CAPSULE ORAL at 20:32

## 2019-06-11 ASSESSMENT — GAIT ASSESSMENTS
DISTANCE (FEET): 150
ASSISTIVE DEVICE: FRONT WHEEL WALKER
GAIT LEVEL OF ASSIST: SUPERVISED

## 2019-06-11 ASSESSMENT — COGNITIVE AND FUNCTIONAL STATUS - GENERAL
HELP NEEDED FOR BATHING: A LITTLE
CLIMB 3 TO 5 STEPS WITH RAILING: A LITTLE
MOBILITY SCORE: 23
DAILY ACTIVITIY SCORE: 21
SUGGESTED CMS G CODE MODIFIER DAILY ACTIVITY: CJ
SUGGESTED CMS G CODE MODIFIER MOBILITY: CI
DRESSING REGULAR LOWER BODY CLOTHING: A LOT

## 2019-06-11 ASSESSMENT — ACTIVITIES OF DAILY LIVING (ADL): TOILETING: INDEPENDENT

## 2019-06-11 NOTE — PROGRESS NOTES
2 RN skin check complete. Sx incision C/D/I, hemovac present and compressed. No s/s of skin breakdown. Baseline lymph edema present to RLE.

## 2019-06-11 NOTE — CARE PLAN
Problem: Infection  Goal: Will remain free from infection  Outcome: PROGRESSING AS EXPECTED  Patient educated regarding infection control including hand hygiene, personal cares. Patient educated regarding s/s of infection including pain, fever, heat and redness at incision site. Patient encouraged to discuss any concerns with RN or care team. Patient verbalized understanding.       Problem: Pain Management  Goal: Pain level will decrease to patient's comfort goal  Outcome: PROGRESSING AS EXPECTED  Patient educated regarding pharmacological and non-pharmacological pain management. Review of pain management, medication schedule, pain scale, and reassessment. Patient educated on PCA use.

## 2019-06-11 NOTE — DISCHARGE PLANNING
Anticipated Discharge Disposition:   Home with help from spouse    Action:    Spoke to patient and spouse.  She stated they live in a 2 story home.  She will be living on the 1st floor.  She was using a FWW at home prior to this hospitalization.  No oxygen use at home; currently receiving 0.5L O2NC with SPO2 97%.  Drinking coffee.  She stated she is able to get to the bathroom with FWW.  Pt concerned of not being able to get off toilet at home.  Informed that Medicare will not cover 3:1 bsc.  Provided options for self pay bsc or locking toilet seat riser with arms.    PT recommending outpt or home health. Pt & spouse declining home health at this time.    Barriers to Discharge:    Hemovac  PCA  Nsgy clearance    Plan:    Review OT eval    Care Transition Team Assessment    Information Source  Orientation : Oriented x 4  Information Given By: Patient  Informant's Name: Rubi Haq  Who is responsible for making decisions for patient? : Patient    Readmission Evaluation  Is this a readmission?: No    Elopement Risk  Legal Hold: No  Ambulatory or Self Mobile in Wheelchair: Yes  Disoriented: No  Psychiatric Symptoms: None  History of Wandering: No  Elopement this Admit: No  Vocalizing Wanting to Leave: No  Displays Behaviors, Body Language Wanting to Leave: No-Not at Risk for Elopement  Elopement Risk: Not at Risk for Elopement    Interdisciplinary Discharge Planning  Lives with - Patient's Self Care Capacity: Spouse  Patient or legal guardian wants to designate a caregiver (see row info): No  Housing / Facility: 1 Story House  Prior Services: None    Discharge Preparedness  What is your plan after discharge?: Home with help  What are your discharge supports?: Spouse  Prior Functional Level: Uses Walker, Independent with Activities of Daily Living, Independent with Medication Management, Drives Self  Difficulity with ADLs: Walking, Toileting  Difficulity with IADLs: Driving, Shopping, Laundry, Cooking    Functional  Assesment  Prior Functional Level: Uses Walker, Independent with Activities of Daily Living, Independent with Medication Management, Drives Self    Finances  Financial Barriers to Discharge: No  Prescription Coverage: Yes    Vision / Hearing Impairment  Right Eye Vision: Impaired, Wears Glasses  Left Eye Vision: Impaired, Wears Glasses         Advance Directive  Advance Directive?: Living Will, DPOA for Health Care    Domestic Abuse  Have you ever been the victim of abuse or violence?: No  Physical Abuse or Sexual Abuse: No  Verbal Abuse or Emotional Abuse: No  Possible Abuse Reported to:: Not Applicable         Discharge Risks or Barriers  Discharge risks or barriers?: No    Anticipated Discharge Information  Anticipated discharge disposition: Home  Discharge Address: [facility] [place] [address]  Discharge Contact Phone Number: [phone]

## 2019-06-11 NOTE — PROGRESS NOTES
Pt A/Ox4. Reports numbness in her RLE that was present prior to surgery. Pt has baseline lymph edema to RLE. Sx site dressing C/D/I, hemovac present and compressed. IV present and infusing. No complaints at this time. Call light within reach, bed alarm on.  at bedside.

## 2019-06-11 NOTE — PROGRESS NOTES
Neurosurgery Progress Note    Subjective:  Pt awake, alert  Doing well  C/o back pain, no radicular pain   PCA working well    Exam:    A&O x3, GCS 15  PERRL  Face symm  LE str 5/5   Sens equal to light touch   Inc c/d/i  Rt leg swelling - baseline lymphadema      BP  Min: 105/59  Max: 118/55  Pulse  Av.4  Min: 53  Max: 69  Resp  Av.7  Min: 9  Max: 18  Temp  Av.3 °C (97.3 °F)  Min: 35.8 °C (96.5 °F)  Max: 36.7 °C (98 °F)  SpO2  Av.3 %  Min: 94 %  Max: 100 %    No Data Recorded    Recent Labs      19   WBC  13.1*   RBC  3.17*   HEMOGLOBIN  10.1*   HEMATOCRIT  32.0*   MCV  100.9*   MCH  31.9   MCHC  31.6*   RDW  50.7*   PLATELETCT  226   MPV  9.2     Recent Labs      19   SODIUM  140   POTASSIUM  4.9   CHLORIDE  107   CO2  25   GLUCOSE  130*   BUN  20   CREATININE  0.82   CALCIUM  8.4*               Intake/Output       06/10/19 0700 - 19 0659 19 0700 - 19 0659       Total 8287-6659 3009-0120 Total       Intake    P.O.  270  -- 270  --  -- --    P.O. 270 -- 270 -- -- --    I.V.  1900  18.8 1918.8  --  -- --    Magnesium Sulfate Volume 100 -- 100 -- -- --    PCA End of Shift Total Volume (ml) -- 18.8 18.8 -- -- --    Volume (mL) (lactated ringers infusion) 1800 -- 1800 -- -- --    Total Intake 2170 18.8 2188.8 -- -- --       Output    Urine  270  2250 2520  --  -- --    Urine 150 -- 150 -- -- --    Number of Times Voided -- -- -- 1 x -- 1 x    Output (mL) ([REMOVED] Urethral Catheter Latex 16 Fr.) 120 2250 2370 -- -- --    Drains  100  200 300  50  -- 50    Output (mL) (Closed/Suction Drain Posterior Back Hemovac) 100 200 300 50 -- 50    Stool  --  -- --  --  -- --    Number of Times Stooled -- 0 x 0 x 0 x -- 0 x    Blood  75  -- 75  --  -- --    Est. Blood Loss 75 -- 75 -- -- --    Total Output 445 2240 2895 50 -- 50       Net I/O     1725 -2431.3 -706.3 -50 -- -50            Intake/Output Summary (Last 24 hours) at 19 0859  Last  data filed at 06/11/19 0730   Gross per 24 hour   Intake          2188.75 ml   Output             2945 ml   Net          -756.25 ml            • acetaminophen  500-1,000 mg Q6HRS PRN   • vitamin D  2,000 Units DAILY   • ferrous sulfate  325 mg DAILY   • gabapentin  300 mg 4X/DAY   • levothyroxine  25 mcg Q EVENING   • simvastatin  40 mg Nightly   • therapeutic multivitamin-minerals  1 Tab DAILY   • Pharmacy Consult Request  1 Each PHARMACY TO DOSE   • MD ALERT...DO NOT ADMINISTER NSAIDS or ASPIRIN unless ORDERED By Neurosurgery  1 Each PRN   • docusate sodium  100 mg BID   • senna-docusate  1 Tab Nightly   • senna-docusate  1 Tab Q24HRS PRN   • polyethylene glycol/lytes  1 Packet BID PRN   • magnesium hydroxide  30 mL QDAY PRN   • bisacodyl  10 mg Q24HRS PRN   • fleet  1 Each Once PRN   • 0.9 % NaCl with KCl 20 mEq 1,000 mL   Continuous   • morphine   Continuous   • morphine injection  2 mg Once   • diphenhydrAMINE  25 mg Q6HRS PRN    Or   • diphenhydrAMINE  25 mg Q6HRS PRN   • ondansetron  4 mg Q4HRS PRN   • ondansetron  4 mg Q4HRS PRN   • tizanidine  2 mg TID PRN   • labetalol  10 mg Q HOUR PRN   • hydrALAZINE  10 mg Q HOUR PRN   HVAC 350 cc since OR, 150 cc past 8 hrs    Assessment and Plan:  Hospital day # 2  POD # 1 L4-5 redo lami/ TLIF  Prophylactic anticoagulation: no         Start date/time: no  Pt/Ot - LSO when OOB  Pain control - cont pca today - switch to orals tomorrow  Bowel protocol  MELANY ackerman  Will order 3:1 bedside chair, has fww at home  ? Home Thurs     ATTENDING ADDENDUM:  Patient seen independently and agree with above note

## 2019-06-11 NOTE — THERAPY
"Physical Therapy Evaluation completed.   Bed Mobility:  Supine to Sit: Supervised  Transfers: Sit to Stand: Supervised  Gait: Level Of Assist: Supervised with Front-Wheel Walker       Plan of Care: Patient with no further skilled PT needs in the acute care setting at this time  Discharge Recommendations: Equipment: No Equipment Needed. Post-acute therapy Discharge to home with outpatient or home health for additional skilled therapy services.    Pt is a 74 year old female admitted to the hospital for L4-L5 TLIF. Pt reports that prior to surgery, she was independent with mobility with FWW. Pt had been using FWW for several months prior to surgery due to pain. Pt educated on spinal precautions and donning LSO EOB. Able to don with cues only. Pt completed all mobility at SPV level. Supine to sit with HOB elevated, pt does have adjustable bed at home. Pt ambulated in hallway with FWW, SPV. Gale overall decreased but no overt LOB or gait deviations noted. Pt did have questions regarding dressing/bathing with spinal precautions, defer those questions to OT. At this time, pt does not demonstrate the need for additional PT intervention while in the acute care setting. Pt is clear for DC home, no post acute equipment needs identified    See \"Rehab Therapy-Acute\" Patient Summary Report for complete documentation.     "

## 2019-06-11 NOTE — CARE PLAN
Problem: Safety  Goal: Will remain free from injury  Outcome: PROGRESSING SLOWER THAN EXPECTED  Pt has remained free from injury this visit. Pt calls for help appropriately. Fall precautions in place, pt educated on fall risk.    Problem: Infection  Goal: Will remain free from infection  Outcome: PROGRESSING SLOWER THAN EXPECTED  No s/s of infection. Pt educated on hand hygiene and IS use.

## 2019-06-12 LAB
ANION GAP SERPL CALC-SCNC: 6 MMOL/L (ref 0–11.9)
BUN SERPL-MCNC: 18 MG/DL (ref 8–22)
CALCIUM SERPL-MCNC: 8.6 MG/DL (ref 8.5–10.5)
CHLORIDE SERPL-SCNC: 101 MMOL/L (ref 96–112)
CO2 SERPL-SCNC: 28 MMOL/L (ref 20–33)
CREAT SERPL-MCNC: 0.9 MG/DL (ref 0.5–1.4)
ERYTHROCYTE [DISTWIDTH] IN BLOOD BY AUTOMATED COUNT: 50.1 FL (ref 35.9–50)
GLUCOSE SERPL-MCNC: 127 MG/DL (ref 65–99)
HCT VFR BLD AUTO: 33.9 % (ref 37–47)
HGB BLD-MCNC: 10.6 G/DL (ref 12–16)
MCH RBC QN AUTO: 31.5 PG (ref 27–33)
MCHC RBC AUTO-ENTMCNC: 31.3 G/DL (ref 33.6–35)
MCV RBC AUTO: 100.9 FL (ref 81.4–97.8)
PLATELET # BLD AUTO: 210 K/UL (ref 164–446)
PMV BLD AUTO: 9.4 FL (ref 9–12.9)
POTASSIUM SERPL-SCNC: 4.1 MMOL/L (ref 3.6–5.5)
RBC # BLD AUTO: 3.36 M/UL (ref 4.2–5.4)
SODIUM SERPL-SCNC: 135 MMOL/L (ref 135–145)
WBC # BLD AUTO: 11.3 K/UL (ref 4.8–10.8)

## 2019-06-12 PROCEDURE — A9270 NON-COVERED ITEM OR SERVICE: HCPCS | Performed by: NURSE PRACTITIONER

## 2019-06-12 PROCEDURE — 700102 HCHG RX REV CODE 250 W/ 637 OVERRIDE(OP): Performed by: NURSE PRACTITIONER

## 2019-06-12 PROCEDURE — 700112 HCHG RX REV CODE 229: Performed by: NURSE PRACTITIONER

## 2019-06-12 PROCEDURE — 700101 HCHG RX REV CODE 250: Performed by: NURSE PRACTITIONER

## 2019-06-12 PROCEDURE — 80048 BASIC METABOLIC PNL TOTAL CA: CPT

## 2019-06-12 PROCEDURE — 770001 HCHG ROOM/CARE - MED/SURG/GYN PRIV*

## 2019-06-12 PROCEDURE — 36415 COLL VENOUS BLD VENIPUNCTURE: CPT

## 2019-06-12 PROCEDURE — 85027 COMPLETE CBC AUTOMATED: CPT

## 2019-06-12 RX ORDER — OXYCODONE HYDROCHLORIDE 5 MG/1
5-10 TABLET ORAL EVERY 4 HOURS PRN
Status: DISCONTINUED | OUTPATIENT
Start: 2019-06-12 | End: 2019-06-14 | Stop reason: HOSPADM

## 2019-06-12 RX ORDER — MORPHINE SULFATE 4 MG/ML
2 INJECTION, SOLUTION INTRAMUSCULAR; INTRAVENOUS
Status: DISCONTINUED | OUTPATIENT
Start: 2019-06-12 | End: 2019-06-14 | Stop reason: HOSPADM

## 2019-06-12 RX ADMIN — SIMVASTATIN 40 MG: 20 TABLET, FILM COATED ORAL at 21:34

## 2019-06-12 RX ADMIN — POLYETHYLENE GLYCOL 3350 1 PACKET: 17 POWDER, FOR SOLUTION ORAL at 04:14

## 2019-06-12 RX ADMIN — GABAPENTIN 300 MG: 300 CAPSULE ORAL at 21:33

## 2019-06-12 RX ADMIN — DOCUSATE SODIUM 100 MG: 100 CAPSULE, LIQUID FILLED ORAL at 16:04

## 2019-06-12 RX ADMIN — GABAPENTIN 300 MG: 300 CAPSULE ORAL at 16:03

## 2019-06-12 RX ADMIN — VITAMIN D, TAB 1000IU (100/BT) 2000 UNITS: 25 TAB at 04:13

## 2019-06-12 RX ADMIN — TIZANIDINE 2 MG: 4 TABLET ORAL at 16:03

## 2019-06-12 RX ADMIN — GABAPENTIN 300 MG: 300 CAPSULE ORAL at 12:13

## 2019-06-12 RX ADMIN — LEVOTHYROXINE SODIUM 25 MCG: 25 TABLET ORAL at 16:03

## 2019-06-12 RX ADMIN — MAGNESIUM HYDROXIDE 30 ML: 400 SUSPENSION ORAL at 04:12

## 2019-06-12 RX ADMIN — MULTIPLE VITAMINS W/ MINERALS TAB 1 TABLET: TAB at 04:13

## 2019-06-12 RX ADMIN — BISACODYL 10 MG: 10 SUPPOSITORY RECTAL at 16:03

## 2019-06-12 RX ADMIN — OXYCODONE HYDROCHLORIDE 10 MG: 5 TABLET ORAL at 12:13

## 2019-06-12 RX ADMIN — SENNOSIDES,DOCUSATE SODIUM 1 TABLET: 8.6; 5 TABLET, FILM COATED ORAL at 21:33

## 2019-06-12 RX ADMIN — GABAPENTIN 300 MG: 300 CAPSULE ORAL at 09:00

## 2019-06-12 RX ADMIN — FERROUS SULFATE TAB 325 MG (65 MG ELEMENTAL FE) 325 MG: 325 (65 FE) TAB at 04:13

## 2019-06-12 RX ADMIN — POTASSIUM CHLORIDE AND SODIUM CHLORIDE: 900; 150 INJECTION, SOLUTION INTRAVENOUS at 04:18

## 2019-06-12 RX ADMIN — DOCUSATE SODIUM 100 MG: 100 CAPSULE, LIQUID FILLED ORAL at 04:13

## 2019-06-12 NOTE — PROGRESS NOTES
RN MOBILITY NOTE     Surgery patient?: yes  Date of surgery: 6/10  Ambulated 50 ft on day of surgery? (N/A if today is not date of surgery): n/a  Number of times ambulated 50 feet or greater today: 3  Patient has been up to chair, edge of bed or HOB 90 degrees for all meals?: not documented  Goal met? (goal is ambulating at least 50 feet 2 times on day shift, one time on night shift): yes  If patient did not meet mobility goal, why?: met

## 2019-06-12 NOTE — PROGRESS NOTES
Neurosurgery Progress Note    Subjective:  Pt awakened  Back pain worse today - expected  No leg pain or n/t  Voiding, + flatus no bm     Exam:    A&O x3, GCS 15  PERRL  Face symm  LE str 5/5   Sens equal to light touch   Inc c/d/i - drain removed   Rt leg swelling - baseline lymphadema      BP  Min: 113/59  Max: 122/71  Pulse  Av.7  Min: 68  Max: 92  Resp  Av.7  Min: 16  Max: 18  Temp  Av.7 °C (98.1 °F)  Min: 36.4 °C (97.6 °F)  Max: 37 °C (98.6 °F)  SpO2  Av.3 %  Min: 93 %  Max: 97 %    No Data Recorded    Recent Labs      19   WBC  13.1*  11.3*   RBC  3.17*  3.36*   HEMOGLOBIN  10.1*  10.6*   HEMATOCRIT  32.0*  33.9*   MCV  100.9*  100.9*   MCH  31.9  31.5   MCHC  31.6*  31.3*   RDW  50.7*  50.1*   PLATELETCT  226  210   MPV  9.2  9.4     Recent Labs      19   030   SODIUM  140  135   POTASSIUM  4.9  4.1   CHLORIDE  107  101   CO2  25  28   GLUCOSE  130*  127*   BUN  20  18   CREATININE  0.82  0.90   CALCIUM  8.4*  8.6               Intake/Output       19 - 19 - 19 Total  Total       Intake    P.O.  240  -- 240  --  -- --    P.O. 240 -- 240 -- -- --    I.V.  --  27.8 27.8  --  -- --    PCA End of Shift Total Volume (ml) -- 27.8 27.8 -- -- --    Total Intake 240 27.8 267.8 -- -- --       Output    Urine  300  -- 300  --  -- --    Number of Times Voided 2 x 2 x 4 x -- -- --    Urine Void (mL) 300 -- 300 -- -- --    Drains  150  20 170  --  -- --    Output (mL) ([REMOVED] Closed/Suction Drain Posterior Back Hemovac) 150 20 170 -- -- --    Stool  --  -- --  --  -- --    Number of Times Stooled 0 x -- 0 x -- -- --    Total Output 450 20 470 -- -- --       Net I/O     -210 7.8 -202.3 -- -- --            Intake/Output Summary (Last 24 hours) at 19 0914  Last data filed at 19 0654   Gross per 24 hour   Intake           267.75 ml   Output               420 ml   Net          -152.25 ml            • acetaminophen  500-1,000 mg Q6HRS PRN   • vitamin D  2,000 Units DAILY   • ferrous sulfate  325 mg DAILY   • gabapentin  300 mg 4X/DAY   • levothyroxine  25 mcg Q EVENING   • simvastatin  40 mg Nightly   • therapeutic multivitamin-minerals  1 Tab DAILY   • Pharmacy Consult Request  1 Each PHARMACY TO DOSE   • MD ALERT...DO NOT ADMINISTER NSAIDS or ASPIRIN unless ORDERED By Neurosurgery  1 Each PRN   • docusate sodium  100 mg BID   • senna-docusate  1 Tab Nightly   • senna-docusate  1 Tab Q24HRS PRN   • polyethylene glycol/lytes  1 Packet BID PRN   • magnesium hydroxide  30 mL QDAY PRN   • bisacodyl  10 mg Q24HRS PRN   • fleet  1 Each Once PRN   • 0.9 % NaCl with KCl 20 mEq 1,000 mL   Continuous   • morphine   Continuous   • diphenhydrAMINE  25 mg Q6HRS PRN    Or   • diphenhydrAMINE  25 mg Q6HRS PRN   • ondansetron  4 mg Q4HRS PRN   • ondansetron  4 mg Q4HRS PRN   • tizanidine  2 mg TID PRN   • labetalol  10 mg Q HOUR PRN   • hydrALAZINE  10 mg Q HOUR PRN       Assessment and Plan:  Hospital day # 2  POD # 1 L4-5 redo lami/ TLIF  Prophylactic anticoagulation: no         Start date/time: no  Pt/Ot - LSO when OOB  Pain control - dc pca, start orals   Bowel protocol  Has fww at home, Medicare will not cover 3:1 bsc -  ordering one  ? Home next 1-2d.

## 2019-06-12 NOTE — CARE PLAN
Problem: Safety  Goal: Will remain free from injury  Outcome: PROGRESSING AS EXPECTED  Safety precautions maintained    Problem: Pain Management  Goal: Pain level will decrease to patient's comfort goal  Outcome: PROGRESSING AS EXPECTED  Medicated per mar, pca dc'ed. Discussed pain plan with patient and updated regarding plan of care

## 2019-06-12 NOTE — THERAPY
"Occupational Therapy Evaluation completed.   Functional Status:  Supervision supine to sit.  Pt requesting help to learn to don compression sock (full leg) on RLE. Pt reports  can help some. Pt has ordered compression sock assist for home. Attempted to practice donning compression sock using sock aide. Pt had difficulty fully donning sock and pulling up her leg. Encouraged pt to keep RLE on chair in front of her and have  assist if able. Discussed possible alternate strategies including use of lower compression sock coupled with ace wrap on upper leg, however pt states she refuses to try this or \"ever use ace wrap again.\" At this point in session pt refused further participation.  Pt stood and and took sidesteps along EOB supervised, and returned to supine without assistance.  Pt educated on use of LHS for washing LB in shower, and use of reacher for donning underwear.  Plan of Care: Will benefit from Occupational Therapy 1x more.  Discharge Recommendations:  Equipment: Long Handle Sponge, Reacher, Bedside Commode to place over toilet. Recommend home health services for continued occupational therapy services (to assist with difficult ADL's in the home).    Pt is 75 y/o F seen for OT evaluation. Pt underwent lumbar laminectomy and fusion. Pt with hx of RLE lymphedema. Pt requesting assist/strategies to don RLE compression sock. Pt became frustrated and agitated when she was not successful with this task, stating that \"the medical field does not care about lymphedema.\" Pt stating that \"there should be a device for this for people with lymphedema, but people just don't care enough about lymphedema to invent one.\" Reflected with pt that she had back surgery and due to restrictions after back surgery, many pt's cannot complete tasks easily at first or exactly as they would like until post-op restrictions are lifted. Pt refusing any further activity. Pt provided education on spinal precautions, adaptive ADL " "strategies, and AE. Will remain available for 1 more session if pt desires to participate prior to DC home, however this is not a barrier to discharge.    See \"Rehab Therapy-Acute\" Patient Summary Report for complete documentation.    "

## 2019-06-12 NOTE — PROGRESS NOTES
Bedside report given by perry Santos. Assumed care at 1915. Pt A&Ox4. Reporting 3-4/10 back pain, PCA in place. Hemovac in place compressed to suction. POC discussed. Pt resting comfortably, treaded socks in place, bed locked and in lowest position, call light in reach, hourly rounding in place.

## 2019-06-13 PROCEDURE — A9270 NON-COVERED ITEM OR SERVICE: HCPCS | Performed by: NURSE PRACTITIONER

## 2019-06-13 PROCEDURE — 700102 HCHG RX REV CODE 250 W/ 637 OVERRIDE(OP): Performed by: NURSE PRACTITIONER

## 2019-06-13 PROCEDURE — 97535 SELF CARE MNGMENT TRAINING: CPT

## 2019-06-13 PROCEDURE — 97530 THERAPEUTIC ACTIVITIES: CPT

## 2019-06-13 PROCEDURE — 770001 HCHG ROOM/CARE - MED/SURG/GYN PRIV*

## 2019-06-13 PROCEDURE — 700112 HCHG RX REV CODE 229: Performed by: NURSE PRACTITIONER

## 2019-06-13 RX ADMIN — SENNOSIDES,DOCUSATE SODIUM 1 TABLET: 8.6; 5 TABLET, FILM COATED ORAL at 20:21

## 2019-06-13 RX ADMIN — GABAPENTIN 300 MG: 300 CAPSULE ORAL at 20:21

## 2019-06-13 RX ADMIN — OXYCODONE HYDROCHLORIDE 10 MG: 5 TABLET ORAL at 14:57

## 2019-06-13 RX ADMIN — GABAPENTIN 300 MG: 300 CAPSULE ORAL at 17:08

## 2019-06-13 RX ADMIN — LEVOTHYROXINE SODIUM 25 MCG: 25 TABLET ORAL at 17:08

## 2019-06-13 RX ADMIN — GABAPENTIN 300 MG: 300 CAPSULE ORAL at 08:04

## 2019-06-13 RX ADMIN — FERROUS SULFATE TAB 325 MG (65 MG ELEMENTAL FE) 325 MG: 325 (65 FE) TAB at 04:38

## 2019-06-13 RX ADMIN — VITAMIN D, TAB 1000IU (100/BT) 2000 UNITS: 25 TAB at 04:38

## 2019-06-13 RX ADMIN — MULTIPLE VITAMINS W/ MINERALS TAB 1 TABLET: TAB at 04:37

## 2019-06-13 RX ADMIN — DOCUSATE SODIUM 100 MG: 100 CAPSULE, LIQUID FILLED ORAL at 17:08

## 2019-06-13 RX ADMIN — DOCUSATE SODIUM 100 MG: 100 CAPSULE, LIQUID FILLED ORAL at 04:38

## 2019-06-13 RX ADMIN — GABAPENTIN 300 MG: 300 CAPSULE ORAL at 12:49

## 2019-06-13 RX ADMIN — SIMVASTATIN 40 MG: 20 TABLET, FILM COATED ORAL at 20:21

## 2019-06-13 RX ADMIN — OXYCODONE HYDROCHLORIDE 10 MG: 5 TABLET ORAL at 08:05

## 2019-06-13 RX ADMIN — OXYCODONE HYDROCHLORIDE 10 MG: 5 TABLET ORAL at 19:00

## 2019-06-13 ASSESSMENT — COGNITIVE AND FUNCTIONAL STATUS - GENERAL
DRESSING REGULAR LOWER BODY CLOTHING: A LOT
SUGGESTED CMS G CODE MODIFIER DAILY ACTIVITY: CJ
HELP NEEDED FOR BATHING: A LITTLE
DRESSING REGULAR UPPER BODY CLOTHING: A LITTLE
DAILY ACTIVITIY SCORE: 20

## 2019-06-13 NOTE — PROGRESS NOTES
Neurosurgery Progress Note    Subjective:  Pt awakened  Back pain - just given meds  No leg pain or n/t  Voiding, + flatus ,  Very sm bm after supp yest    Exam:    A&O x3, GCS 15  PERRL  Face symm  LE str 5/5   Sens equal to light touch   Inc c/d/i  Rt leg swelling - baseline lymphadema      BP  Min: 114/57  Max: 131/62  Pulse  Av.3  Min: 71  Max: 99  Resp  Av  Min: 16  Max: 18  Temp  Av.7 °C (98 °F)  Min: 35.9 °C (96.7 °F)  Max: 37.3 °C (99.1 °F)  SpO2  Av.5 %  Min: 88 %  Max: 98 %    No Data Recorded    Recent Labs      19   WBC  13.1*  11.3*   RBC  3.17*  3.36*   HEMOGLOBIN  10.1*  10.6*   HEMATOCRIT  32.0*  33.9*   MCV  100.9*  100.9*   MCH  31.9  31.5   MCHC  31.6*  31.3*   RDW  50.7*  50.1*   PLATELETCT  226  210   MPV  9.2  9.4     Recent Labs      19   030   SODIUM  140  135   POTASSIUM  4.9  4.1   CHLORIDE  107  101   CO2  25  28   GLUCOSE  130*  127*   BUN  20  18   CREATININE  0.82  0.90   CALCIUM  8.4*  8.6               Intake/Output       19 07 - 19 - 19 59       Total  Total       Intake    P.O.  --  -- --  210  -- 210    P.O. -- -- -- 210 -- 210    Total Intake -- -- -- 210 -- 210       Output    Urine  --  -- --  --  -- --    Number of Times Voided -- 3 x 3 x -- -- --    Stool  --  -- --  --  -- --    Number of Times Stooled -- 2 x 2 x -- -- --    Total Output -- -- -- -- -- --       Net I/O     -- -- -- 210 -- 210            Intake/Output Summary (Last 24 hours) at 19 0837  Last data filed at 19 0800   Gross per 24 hour   Intake              210 ml   Output                0 ml   Net              210 ml            • oxyCODONE immediate-release  5-10 mg Q4HRS PRN   • morphine injection  2 mg Q2HRS PRN   • acetaminophen  500-1,000 mg Q6HRS PRN   • vitamin D  2,000 Units DAILY   • ferrous sulfate  325 mg DAILY   • gabapentin  300 mg 4X/DAY    • levothyroxine  25 mcg Q EVENING   • simvastatin  40 mg Nightly   • therapeutic multivitamin-minerals  1 Tab DAILY   • Pharmacy Consult Request  1 Each PHARMACY TO DOSE   • MD ALERT...DO NOT ADMINISTER NSAIDS or ASPIRIN unless ORDERED By Neurosurgery  1 Each PRN   • docusate sodium  100 mg BID   • senna-docusate  1 Tab Nightly   • senna-docusate  1 Tab Q24HRS PRN   • polyethylene glycol/lytes  1 Packet BID PRN   • magnesium hydroxide  30 mL QDAY PRN   • bisacodyl  10 mg Q24HRS PRN   • fleet  1 Each Once PRN   • 0.9 % NaCl with KCl 20 mEq 1,000 mL   Continuous   • diphenhydrAMINE  25 mg Q6HRS PRN    Or   • diphenhydrAMINE  25 mg Q6HRS PRN   • ondansetron  4 mg Q4HRS PRN   • ondansetron  4 mg Q4HRS PRN   • tizanidine  2 mg TID PRN   • labetalol  10 mg Q HOUR PRN   • hydrALAZINE  10 mg Q HOUR PRN       Assessment and Plan:  Hospital day # 3  POD # 2 L4-5 redo lami/ TLIF  Prophylactic anticoagulation: no         Start date/time: no  Pt/Ot - LSO when OOB  Pain control   Bowel protocol  Has fww at home, Medicare will not cover 3:1 bsc -  ordering one  Slow to mobilize, may benefit from rehab or SNF before going home  Will order consults, pt agrees

## 2019-06-13 NOTE — DISCHARGE PLANNING
Received Choice form at 1125.  Agency/Facility Name: [facility]  Referral sent per Choice form at 1130.

## 2019-06-13 NOTE — CARE PLAN
Problem: Safety  Goal: Will remain free from injury  Outcome: PROGRESSING AS EXPECTED    Intervention: Provide assistance with mobility  Encourage to call for any assistance needed, call light within reach.      Problem: Pain Management  Goal: Pain level will decrease to patient's comfort goal  Outcome: PROGRESSING AS EXPECTED    Intervention: Educate and implement non-pharmacologic comfort measures. Examples: relaxation, distration, play therapy, activity therapy, massage, etc.  Pain assessment q 2 hours,medicated as needed, comfort measures provided.

## 2019-06-13 NOTE — CARE PLAN
Problem: Venous Thromboembolism (VTW)/Deep Vein Thrombosis (DVT) Prevention:  Goal: Patient will participate in Venous Thrombosis (VTE)/Deep Vein Thrombosis (DVT)Prevention Measures  Outcome: PROGRESSING AS EXPECTED  SCDs on and in use. No swelling or redness in lower extremities. No other signs or symptoms of DVT.     Problem: Bowel/Gastric:  Goal: Normal bowel function is maintained or improved  Outcome: PROGRESSING AS EXPECTED  Pt had BM after bowel protocol.

## 2019-06-13 NOTE — PROGRESS NOTES
Bedside report given by day RN. Assumed care at 1900. Pt A&Ox4, lethargic. Reporting minimal pain, not requesting pain medication. Pt had BM, IV saline locked. POC discussed. Pt resting comfortably, treaded socks in place, bed locked and in lowest position, bed alarm on, call light in reach, hourly rounding in place.

## 2019-06-13 NOTE — DISCHARGE PLANNING
PMR order received from LY Mackay.  MCR/MICHAELP is shown for her medical provider.  POD # 2 L4-5 redo lami/ TLIF.  Rubi is not requiring 2 of 3 disciplines.  This referral will not be forwarded to Physiatry.  Please re-consult for further review.  I do appreciate the referral.

## 2019-06-14 VITALS
BODY MASS INDEX: 26.53 KG/M2 | OXYGEN SATURATION: 95 % | SYSTOLIC BLOOD PRESSURE: 116 MMHG | RESPIRATION RATE: 18 BRPM | WEIGHT: 155.42 LBS | HEIGHT: 64 IN | DIASTOLIC BLOOD PRESSURE: 59 MMHG | TEMPERATURE: 99 F | HEART RATE: 86 BPM

## 2019-06-14 PROCEDURE — 700102 HCHG RX REV CODE 250 W/ 637 OVERRIDE(OP): Performed by: NURSE PRACTITIONER

## 2019-06-14 PROCEDURE — A9270 NON-COVERED ITEM OR SERVICE: HCPCS | Performed by: NURSE PRACTITIONER

## 2019-06-14 PROCEDURE — 700112 HCHG RX REV CODE 229: Performed by: NURSE PRACTITIONER

## 2019-06-14 RX ORDER — AMOXICILLIN 250 MG
1 CAPSULE ORAL
Qty: 30 TAB | Refills: 0 | Status: ON HOLD
Start: 2019-06-14 | End: 2023-11-11

## 2019-06-14 RX ORDER — POLYETHYLENE GLYCOL 3350 17 G/17G
17 POWDER, FOR SOLUTION ORAL 2 TIMES DAILY PRN
Refills: 3 | Status: ON HOLD
Start: 2019-06-14 | End: 2023-11-11

## 2019-06-14 RX ORDER — OXYCODONE HYDROCHLORIDE 5 MG/1
5 TABLET ORAL EVERY 4 HOURS PRN
Qty: 21 TAB | Refills: 0 | Status: SHIPPED | OUTPATIENT
Start: 2019-06-14 | End: 2019-06-17

## 2019-06-14 RX ORDER — TIZANIDINE 2 MG/1
2 TABLET ORAL 3 TIMES DAILY PRN
Qty: 30 TAB | Refills: 3
Start: 2019-06-14

## 2019-06-14 RX ADMIN — GABAPENTIN 300 MG: 300 CAPSULE ORAL at 08:44

## 2019-06-14 RX ADMIN — GABAPENTIN 300 MG: 300 CAPSULE ORAL at 12:03

## 2019-06-14 RX ADMIN — DOCUSATE SODIUM 100 MG: 100 CAPSULE, LIQUID FILLED ORAL at 17:12

## 2019-06-14 RX ADMIN — FERROUS SULFATE TAB 325 MG (65 MG ELEMENTAL FE) 325 MG: 325 (65 FE) TAB at 04:49

## 2019-06-14 RX ADMIN — OXYCODONE HYDROCHLORIDE 5 MG: 5 TABLET ORAL at 04:52

## 2019-06-14 RX ADMIN — MULTIPLE VITAMINS W/ MINERALS TAB 1 TABLET: TAB at 04:49

## 2019-06-14 RX ADMIN — DOCUSATE SODIUM 100 MG: 100 CAPSULE, LIQUID FILLED ORAL at 04:49

## 2019-06-14 RX ADMIN — GABAPENTIN 300 MG: 300 CAPSULE ORAL at 17:12

## 2019-06-14 RX ADMIN — LEVOTHYROXINE SODIUM 25 MCG: 25 TABLET ORAL at 17:13

## 2019-06-14 RX ADMIN — OXYCODONE HYDROCHLORIDE 10 MG: 5 TABLET ORAL at 14:49

## 2019-06-14 RX ADMIN — VITAMIN D, TAB 1000IU (100/BT) 2000 UNITS: 25 TAB at 04:49

## 2019-06-14 NOTE — CARE PLAN
Problem: Venous Thromboembolism (VTW)/Deep Vein Thrombosis (DVT) Prevention:  Goal: Patient will participate in Venous Thrombosis (VTE)/Deep Vein Thrombosis (DVT)Prevention Measures  Outcome: PROGRESSING AS EXPECTED  SCDs on and in use. No swelling or redness in lower extremities. No other signs or symptoms of DVT.     Problem: Discharge Barriers/Planning  Goal: Patient's continuum of care needs will be met  Outcome: PROGRESSING AS EXPECTED  Case management is setting up skilled facility for pt to discharge to.

## 2019-06-14 NOTE — DISCHARGE INSTRUCTIONS
Discharge Instructions    Discharged to home by car with relative. Discharged via wheelchair, hospital escort: Yes.  Special equipment needed: TLSO    Be sure to schedule a follow-up appointment with your primary care doctor or any specialists as instructed.     Discharge Plan:   Influenza Vaccine Indication: Not indicated: Previously immunized this influenza season and > 8 years of age    I understand that a diet low in cholesterol, fat, and sodium is recommended for good health. Unless I have been given specific instructions below for another diet, I accept this instruction as my diet prescription.   Other diet: Regular    Special Instructions:   pecial Instructions:   Percocet for pain, add tizanidine   Mobilize as tolerated take frequent short walks to prevent complications from surgery  You can shower  do not rub incision site pat wash and dry  No driving while taking pain meds and muscle relaxer  No heavy lifting anything greater than 15lbs for at least 4 weeks  Watch for signs of infection at your incision site including increased redness swelling tenderness and fever  Do not take any NSAIDS until approved by Dr Marquez  Call [facility] (Dr Marquez office) for follow up and if you have any questions or concerns ([phone])      · Is patient discharged on Warfarin / Coumadin?   No     Depression / Suicide Risk    As you are discharged from this [facility] facility, it is important to learn how to keep safe from harming yourself.    Recognize the warning signs:  · Abrupt changes in personality, positive or negative- including increase in energy   · Giving away possessions  · Change in eating patterns- significant weight changes-  positive or negative  · Change in sleeping patterns- unable to sleep or sleeping all the time   · Unwillingness or inability to communicate  · Depression  · Unusual sadness, discouragement and loneliness  · Talk of wanting to die  · Neglect of personal  appearance   · Rebelliousness- reckless behavior  · Withdrawal from people/activities they love  · Confusion- inability to concentrate     If you or a loved one observes any of these behaviors or has concerns about self-harm, here's what you can do:  · Talk about it- your feelings and reasons for harming yourself  · Remove any means that you might use to hurt yourself (examples: pills, rope, extension cords, firearm)  · Get professional help from the community (Mental Health, Substance Abuse, psychological counseling)  · Do not be alone:Call your Safe Contact- someone whom you trust who will be there for you.  · Call your local CRISIS HOTLINE 431-3115 or 883-676-4454  · Call your local Children's Mobile Crisis Response Team [place] (469) 596-5814 or www.phorus  · Call the toll free National Suicide Prevention Hotlines   · National Suicide Prevention Lifeline 364-507-OOHC (4475)  · National Hope Line Network 800-SUICIDE (786-7810)

## 2019-06-14 NOTE — PROGRESS NOTES
Neurosurgery Progress Note    Subjective:  Doing well, pain controlled  No leg pain or n/t  Voiding, + flatus ,  Very sm bm after supp 2 d ago    Exam:    LE str 5/5   Sens equal to light touch   Inc c/d/i  Rt leg swelling - baseline lymphadema      BP  Min: 105/56  Max: 117/55  Pulse  Av.3  Min: 85  Max: 93  Resp  Av.3  Min: 17  Max: 18  Temp  Av.1 °C (98.8 °F)  Min: 36.3 °C (97.4 °F)  Max: 37.9 °C (100.2 °F)  SpO2  Av.3 %  Min: 91 %  Max: 95 %    No Data Recorded    Recent Labs      19   0307   WBC  11.3*   RBC  3.36*   HEMOGLOBIN  10.6*   HEMATOCRIT  33.9*   MCV  100.9*   MCH  31.5   MCHC  31.3*   RDW  50.1*   PLATELETCT  210   MPV  9.4     Recent Labs      19   0307   SODIUM  135   POTASSIUM  4.1   CHLORIDE  101   CO2  28   GLUCOSE  127*   BUN  18   CREATININE  0.90   CALCIUM  8.6               Intake/Output       19 0700 - 19 0659 19 07 - 06/15/19 0659      [id number] 8137-2601 Total 6068-9336 0788-5090 Total       Intake    P.O.  410  -- 410  --  -- --    P.O. 410 -- 410 -- -- --    Total Intake 410 -- 410 -- -- --       Output    Urine  --  -- --  --  -- --    Number of Times Voided 2 x 1 x 3 x -- -- --    Total Output -- -- -- -- -- --       Net I/O     410 -- 410 -- -- --            Intake/Output Summary (Last 24 hours) at 19 0836  Last data filed at 19 1250   Gross per 24 hour   Intake              200 ml   Output                0 ml   Net              200 ml            • oxyCODONE immediate-release  5-10 mg Q4HRS PRN   • morphine injection  2 mg Q2HRS PRN   • acetaminophen  500-1,000 mg Q6HRS PRN   • vitamin D  2,000 Units DAILY   • ferrous sulfate  325 mg DAILY   • gabapentin  300 mg 4X/DAY   • levothyroxine  25 mcg Q EVENING   • simvastatin  40 mg Nightly   • therapeutic multivitamin-minerals  1 Tab DAILY   • Pharmacy Consult Request  1 Each PHARMACY TO DOSE   • MD ALERT...DO NOT ADMINISTER NSAIDS or ASPIRIN unless ORDERED By Neurosurgery  1  Each PRN   • docusate sodium  100 mg BID   • senna-docusate  1 Tab Nightly   • senna-docusate  1 Tab Q24HRS PRN   • polyethylene glycol/lytes  1 Packet BID PRN   • magnesium hydroxide  30 mL QDAY PRN   • bisacodyl  10 mg Q24HRS PRN   • fleet  1 Each Once PRN   • 0.9 % NaCl with KCl 20 mEq 1,000 mL   Continuous   • diphenhydrAMINE  25 mg Q6HRS PRN    Or   • diphenhydrAMINE  25 mg Q6HRS PRN   • ondansetron  4 mg Q4HRS PRN   • ondansetron  4 mg Q4HRS PRN   • tizanidine  2 mg TID PRN   • labetalol  10 mg Q HOUR PRN   • hydrALAZINE  10 mg Q HOUR PRN       Assessment and Plan:  Hospital day # 4  POD # 3 L4-5 redo lami/ TLIF  Prophylactic anticoagulation: no         Start date/time: no  Pt/Ot - LSO when OOB  Pain control   Bowel protocol  Discharge to SNF when approved  Prepped  Rx on chart for 3 days of pain meds  ORT=low risk, consent signed at our office, no abbarant activity on

## 2019-06-14 NOTE — DISCHARGE PLANNING
Anticipated Discharge Disposition:   Home with help from     Action:    [facility] unable to accept patient until 6-17-19 because of staffing.      Pt does not want to stay at hospital until 6-17-19 and requesting to dc to home today.  She feels comfortable going home with help from spouse. Rx on chart for Oxycodone #21.  Pt stated this will be enough.  Pt calling  to come and pick her up.    Spoke to TJ Mackay and informed of above.  Ok to dc today.  She will speak with bedside RN for dc.    Barriers to Discharge:    None     Plan:    DC today.

## 2019-06-14 NOTE — DISCHARGE PLANNING
Anticipated Discharge Disposition:   SNF    Action:    Pt and spouse informed RN DARRICK that they are working with [facility] SNF for acceptance.  They requested email to be sent to [email or web address] to request status on referral.    Barriers to Discharge:    SNF acceptance    Plan:    F/U with [facility].

## 2019-06-14 NOTE — THERAPY
"Occupational Therapy Treatment completed with focus on ADLs and ADL transfers.  Functional Status:  Min A supine to sit w/HOB raised.  Supervision sit to stand.  Min A to don LSO and robe.  Pt walked hallway with FWW; pt moving very slowly with shuffled/short steps.  Pt returned to room and completed toilet transfer supervised, toilet hygiene mod independent.  Pt returned to bed supervised with HOB raised.  Plan of Care: Will benefit from Occupational Therapy 3 times per week  Discharge Recommendations:  Equipment Will Continue to Assess for Equipment Needs. Recommend inpatient transitional care services for continued occupational therapy services.     Pt seen for OT tx. Pt appears to have declined and reports she feels weaker than after surgery. Pt reports her  brought in her stocking aide but that she told him to take it back home. Pt eager to DC to SNF for further rehab. Pt will continue to benefit from acute OT services. Will increase frequency to 3x/week.     See \"Rehab Therapy-Acute\" Patient Summary Report for complete documentation.   "

## 2019-06-14 NOTE — DISCHARGE PLANNING
Agency/Facility Name: [facility]   Spoke To: Yamileth [phone]  Outcome: Unable to accept patient today as they have a number of ER admins that they need to admin ahead of patient. Per Yamileth, a bed should be available for patient on Monday. CCA will check back in with Yamileth first thing Monday morning to schedule transport. JESUS Hogue notififed.

## 2019-06-14 NOTE — DISCHARGE SUMMARY
DATE OF ADMISSION:  06/10/2019    DATE OF DISCHARGE TO SKILLED NURSING FACILITY:  06/14/2019    ADMITTING DIAGNOSES:  L4-L5 spondylolisthesis with motor and sensory   radiculopathy recalcitrant to nonoperative measures.    HISTORY OF PRESENT ILLNESS:  Please refer to the previously dictated history   and physical for complete details.  The patient is a 74-year-old patient of   Dr. Marquez had the above findings.  Dr. Marquez discussed surgery and the risks   and benefits and the patient wished to proceed.    COURSE OF HOSPITALIZATION:  The patient was admitted to [facility] on 06/10/2019   and on that date, she was brought to the operating room where she underwent a   redo L4-L5 laminectomy, transforaminal lumbar interbody fusion and pedicle   screw instrumented fusion at L4-L5 on 06/10/2019.    Postoperatively, she has had back pain.  No leg symptoms.  On 06/14/2019, her   pain is controlled.  Her strength is 5/5.  She is ambulatory with assistance,   voiding and eating.  She has had a small bowel movement 2 days ago.  We will   go ahead and prepare her to discharge to skilled nursing facility.    DISCHARGE INSTRUCTIONS:  1.  Okay to shower, pat incision dry, leave open to air.  2.  No lifting or repetitive bending or twisting.  3.  Off-the-shelf LSO brace on when out of bed more than 5 minutes, off for   showering and for bathroom, off in bed.  4.  No aspirin or nonsteroidal anti-inflammatory medications.  5.  Follow up with [facility] 2 weeks postoperatively.  6.  Bowel protocol.    DISCHARGE MEDICATIONS:  1.  Oxycodone 5 mg every 4 hours p.r.n. pain, #21 (prescription on chart).  2.  Zocor 40 mg p.o. daily.  3.  Synthroid 25 mcg p.o. daily.  4.  MiraLax p.o. daily p.r.n. no bowel movement.  5.  Shira-Colace 1 tablet daily.  6.  Gabapentin 300 mg p.o. q.i.d.  7.  Tizanidine 2 mg every 8 hours p.r.n. spasm.  8.  Multivitamin p.o. daily.  9.  Vitamin D 2000 units daily.    IMPRESSION AND PLAN:  1.   Admitting diagnosis, L4-L5 spondylolisthesis with motor and sensory   radiculopathy recalcitrant to nonoperative measures.  2.  Operation performed redo L4-L5 laminectomy, transforaminal lumbar   interbody fusion and pedicle screw instrumented fusion with Dr. Marquez on   06/10/2019.  3.  The patient is discharged in stable medical condition with the   instructions and medications as outlined above to skilled nursing facility.       ____________________________________     LY STEPHENS / DARREN    DD:  06/14/2019 08:44:57  DT:  06/14/2019 09:19:03    D#:  [id number]  Job#:  [id number]

## 2019-06-14 NOTE — PROGRESS NOTES
Bedside report given by day RN. Assumed care at 1900. Pt A&Ox4. Reporting 4/10 pain in back, medicated per MAR. IV patent, MERYL, JEFFREY. POC discussed. Pt resting comfortably, treaded socks in place, bed locked and in lowest position, call light in reach, hourly rounding in place.

## 2019-06-14 NOTE — PROGRESS NOTES
RN MOBILITY NOTE     Surgery patient?: yes  Date of surgery: 6/10/19  Ambulated 50 ft on day of surgery? (N/A if today is not date of surgery): n/a  Number of times ambulated 50 feet or greater today: 3  Patient has been up to chair, edge of bed or HOB 90 degrees for all meals?: yes  Goal met? (goal is ambulating at least 50 feet 2 times on day shift, one time on night shift): yes  If patient did not meet mobility goal, why?: met

## 2019-06-15 NOTE — DISCHARGE PLANNING
Anticipated Discharge Disposition:   [facility]    Action:    Received telephone call from Cristal Harris NP that patient can be accepted today.  Spouse is on his way and will transport pt via private vehicle to SNF today.  Pt agreeable. Verbal for Cobra obtained.    Text to TJ Mackay with update.  Rx sent to [facility].  Cobra was signed earlier by TJ Mackay.    DC packet given to bedside JESUS Johansen and aware of above.    Barriers to Discharge:    None    Plan:    DC

## 2019-07-05 ENCOUNTER — HOSPITAL ENCOUNTER (OUTPATIENT)
Dept: RADIOLOGY | Facility: MEDICAL CENTER | Age: 74
End: 2019-07-05
Attending: CLINICAL NURSE SPECIALIST
Payer: MEDICARE

## 2019-07-05 DIAGNOSIS — R60.0 LOCALIZED EDEMA: ICD-10-CM

## 2019-07-05 PROCEDURE — 93971 EXTREMITY STUDY: CPT | Mod: LT

## 2023-11-05 ENCOUNTER — APPOINTMENT (OUTPATIENT)
Dept: RADIOLOGY | Facility: MEDICAL CENTER | Age: 78
DRG: 853 | End: 2023-11-05
Attending: SURGERY
Payer: MEDICARE

## 2023-11-05 ENCOUNTER — APPOINTMENT (OUTPATIENT)
Dept: RADIOLOGY | Facility: MEDICAL CENTER | Age: 78
DRG: 853 | End: 2023-11-05
Attending: INTERNAL MEDICINE
Payer: MEDICARE

## 2023-11-05 ENCOUNTER — HOSPITAL ENCOUNTER (OUTPATIENT)
Dept: RADIOLOGY | Facility: MEDICAL CENTER | Age: 78
End: 2023-11-05

## 2023-11-05 ENCOUNTER — ANESTHESIA EVENT (OUTPATIENT)
Dept: SURGERY | Facility: MEDICAL CENTER | Age: 78
DRG: 853 | End: 2023-11-05
Payer: MEDICARE

## 2023-11-05 ENCOUNTER — HOSPITAL ENCOUNTER (INPATIENT)
Facility: MEDICAL CENTER | Age: 78
LOS: 16 days | DRG: 853 | End: 2023-11-21
Attending: STUDENT IN AN ORGANIZED HEALTH CARE EDUCATION/TRAINING PROGRAM | Admitting: INTERNAL MEDICINE
Payer: MEDICARE

## 2023-11-05 ENCOUNTER — ANESTHESIA (OUTPATIENT)
Dept: SURGERY | Facility: MEDICAL CENTER | Age: 78
DRG: 853 | End: 2023-11-05
Payer: MEDICARE

## 2023-11-05 DIAGNOSIS — Z78.9 NO CONTRAINDICATION TO DEEP VEIN THROMBOSIS (DVT) PROPHYLAXIS: ICD-10-CM

## 2023-11-05 DIAGNOSIS — R33.9 URINARY RETENTION: ICD-10-CM

## 2023-11-05 DIAGNOSIS — K56.609 SMALL BOWEL OBSTRUCTION (HCC): ICD-10-CM

## 2023-11-05 DIAGNOSIS — T81.89XA DELAYED SURGICAL WOUND HEALING, INITIAL ENCOUNTER: ICD-10-CM

## 2023-11-05 DIAGNOSIS — G89.18 POSTOPERATIVE PAIN: ICD-10-CM

## 2023-11-05 PROBLEM — R79.89 ELEVATED SERUM CREATININE: Status: ACTIVE | Noted: 2023-11-05

## 2023-11-05 PROBLEM — J95.821 RESPIRATORY FAILURE FOLLOWING TRAUMA AND SURGERY (HCC): Status: ACTIVE | Noted: 2023-11-05

## 2023-11-05 PROBLEM — A41.9 SEPSIS (HCC): Status: ACTIVE | Noted: 2023-11-05

## 2023-11-05 PROBLEM — G93.40 ENCEPHALOPATHY: Status: ACTIVE | Noted: 2023-11-05

## 2023-11-05 PROBLEM — Z85.42 HISTORY OF ENDOMETRIAL CANCER: Status: ACTIVE | Noted: 2023-11-05

## 2023-11-05 PROBLEM — E78.5 HLD (HYPERLIPIDEMIA): Status: ACTIVE | Noted: 2023-11-05

## 2023-11-05 PROBLEM — E03.9 HYPOTHYROID: Status: ACTIVE | Noted: 2023-11-05

## 2023-11-05 PROBLEM — N17.9 AKI (ACUTE KIDNEY INJURY) (HCC): Status: ACTIVE | Noted: 2023-11-05

## 2023-11-05 PROBLEM — R60.0 BILATERAL LEG EDEMA: Status: ACTIVE | Noted: 2023-11-05

## 2023-11-05 PROBLEM — I82.409 ACUTE DEEP VEIN THROMBOSIS (DVT) (HCC): Status: ACTIVE | Noted: 2023-11-05

## 2023-11-05 LAB
ABO + RH BLD: NORMAL
ABO GROUP BLD: NORMAL
ALBUMIN SERPL BCP-MCNC: 3.2 G/DL (ref 3.2–4.9)
ALBUMIN/GLOB SERPL: 1.1 G/DL
ALP SERPL-CCNC: 51 U/L (ref 30–99)
ALT SERPL-CCNC: 18 U/L (ref 2–50)
AMMONIA PLAS-SCNC: 19 UMOL/L (ref 11–45)
ANION GAP SERPL CALC-SCNC: 15 MMOL/L (ref 7–16)
APPEARANCE UR: CLEAR
AST SERPL-CCNC: 34 U/L (ref 12–45)
BACTERIA #/AREA URNS HPF: ABNORMAL /HPF
BASE EXCESS BLDA CALC-SCNC: -2 MMOL/L (ref -4–3)
BASE EXCESS BLDA CALC-SCNC: -6 MMOL/L (ref -4–3)
BASOPHILS # BLD AUTO: 0 % (ref 0–1.8)
BASOPHILS # BLD: 0 K/UL (ref 0–0.12)
BILIRUB SERPL-MCNC: 0.4 MG/DL (ref 0.1–1.5)
BILIRUB UR QL STRIP.AUTO: NEGATIVE
BLD GP AB SCN SERPL QL: NORMAL
BODY TEMPERATURE: 36.4 CENTIGRADE
BODY TEMPERATURE: ABNORMAL DEGREES
BREATHS SETTING VENT: 20
BUN SERPL-MCNC: 36 MG/DL (ref 8–22)
CALCIUM ALBUM COR SERPL-MCNC: 9.5 MG/DL (ref 8.5–10.5)
CALCIUM SERPL-MCNC: 8.9 MG/DL (ref 8.5–10.5)
CFT BLD TEG: 3.8 MIN (ref 4.6–9.1)
CFT P HPASE BLD TEG: 3.5 MIN (ref 4.3–8.3)
CHLORIDE SERPL-SCNC: 104 MMOL/L (ref 96–112)
CLOT ANGLE BLD TEG: 77.5 DEGREES (ref 63–78)
CLOT LYSIS 30M P MA LENFR BLD TEG: 0.1 % (ref 0–2.6)
CO2 BLDA-SCNC: 20 MMOL/L (ref 20–33)
CO2 SERPL-SCNC: 21 MMOL/L (ref 20–33)
COLOR UR: YELLOW
CREAT SERPL-MCNC: 2.13 MG/DL (ref 0.5–1.4)
CT.EXTRINSIC BLD ROTEM: 0.9 MIN (ref 0.8–2.1)
DELSYS IDSYS: ABNORMAL
EKG IMPRESSION: NORMAL
END TIDAL CARBON DIOXIDE IECO2: 30 MMHG
EOSINOPHIL # BLD AUTO: 0 K/UL (ref 0–0.51)
EOSINOPHIL NFR BLD: 0 % (ref 0–6.9)
EPI CELLS #/AREA URNS HPF: NEGATIVE /HPF
ERYTHROCYTE [DISTWIDTH] IN BLOOD BY AUTOMATED COUNT: 50.2 FL (ref 35.9–50)
GFR SERPLBLD CREATININE-BSD FMLA CKD-EPI: 23 ML/MIN/1.73 M 2
GLOBULIN SER CALC-MCNC: 2.8 G/DL (ref 1.9–3.5)
GLUCOSE BLD STRIP.AUTO-MCNC: 108 MG/DL (ref 65–99)
GLUCOSE BLD STRIP.AUTO-MCNC: 117 MG/DL (ref 65–99)
GLUCOSE BLD STRIP.AUTO-MCNC: 98 MG/DL (ref 65–99)
GLUCOSE SERPL-MCNC: 101 MG/DL (ref 65–99)
GLUCOSE UR STRIP.AUTO-MCNC: NEGATIVE MG/DL
HCO3 BLDA-SCNC: 18.8 MMOL/L (ref 17–25)
HCO3 BLDA-SCNC: 23 MMOL/L (ref 17–25)
HCT VFR BLD AUTO: 36.5 % (ref 37–47)
HGB BLD-MCNC: 11.5 G/DL (ref 12–16)
HOROWITZ INDEX BLDA+IHG-RTO: 214 MM[HG]
HYALINE CASTS #/AREA URNS LPF: ABNORMAL /LPF
INHALED O2 FLOW RATE: 4 L/MIN
INHALED O2 FLOW RATE: 4 L/MIN (ref 2–10)
KETONES UR STRIP.AUTO-MCNC: ABNORMAL MG/DL
LACTATE SERPL-SCNC: 2.3 MMOL/L (ref 0.5–2)
LACTATE SERPL-SCNC: 3.9 MMOL/L (ref 0.5–2)
LEUKOCYTE ESTERASE UR QL STRIP.AUTO: NEGATIVE
LYMPHOCYTES # BLD AUTO: 0.93 K/UL (ref 1–4.8)
LYMPHOCYTES NFR BLD: 5.1 % (ref 22–41)
MANUAL DIFF BLD: NORMAL
MCF BLD TEG: 65.9 MM (ref 52–69)
MCF.PLATELET INHIB BLD ROTEM: 27.7 MM (ref 15–32)
MCH RBC QN AUTO: 31.2 PG (ref 27–33)
MCHC RBC AUTO-ENTMCNC: 31.5 G/DL (ref 32.2–35.5)
MCV RBC AUTO: 98.9 FL (ref 81.4–97.8)
MICRO URNS: ABNORMAL
MODE IMODE: ABNORMAL
MONOCYTES # BLD AUTO: 0.62 K/UL (ref 0–0.85)
MONOCYTES NFR BLD AUTO: 3.4 % (ref 0–13.4)
MORPHOLOGY BLD-IMP: NORMAL
NEUTROPHILS # BLD AUTO: 16.65 K/UL (ref 1.82–7.42)
NEUTROPHILS NFR BLD: 82.9 % (ref 44–72)
NEUTS BAND NFR BLD MANUAL: 8.6 % (ref 0–10)
NITRITE UR QL STRIP.AUTO: NEGATIVE
NRBC # BLD AUTO: 0 K/UL
NRBC BLD-RTO: 0 /100 WBC (ref 0–0.2)
NT-PROBNP SERPL IA-MCNC: 7276 PG/ML (ref 0–125)
O2/TOTAL GAS SETTING VFR VENT: 50 %
OVALOCYTES BLD QL SMEAR: NORMAL
PA AA BLD-ACNC: 73.1 % (ref 0–11)
PA ADP BLD-ACNC: 68.3 % (ref 0–17)
PCO2 BLDA: 32.4 MMHG (ref 26–37)
PCO2 BLDA: 38.6 MMHG (ref 26–37)
PCO2 TEMP ADJ BLDA: 31.7 MMHG (ref 26–37)
PCO2 TEMP ADJ BLDA: 37.6 MMHG (ref 26–37)
PEEP END EXPIRATORY PRESSURE IPEEP: 8 CMH20
PH BLDA: 7.37 [PH] (ref 7.4–7.5)
PH BLDA: 7.39 [PH] (ref 7.4–7.5)
PH TEMP ADJ BLDA: 7.38 [PH] (ref 7.4–7.5)
PH TEMP ADJ BLDA: 7.4 [PH] (ref 7.4–7.5)
PH UR STRIP.AUTO: 5.5 [PH] (ref 5–8)
PLATELET # BLD AUTO: 240 K/UL (ref 164–446)
PLATELET BLD QL SMEAR: NORMAL
PMV BLD AUTO: 10.2 FL (ref 9–12.9)
PO2 BLDA: 107 MMHG (ref 64–87)
PO2 BLDA: 86.1 MMHG (ref 64–87)
PO2 TEMP ADJ BLDA: 104 MMHG (ref 64–87)
PO2 TEMP ADJ BLDA: 82.9 MMHG (ref 64–87)
POIKILOCYTOSIS BLD QL SMEAR: NORMAL
POTASSIUM SERPL-SCNC: 5.1 MMOL/L (ref 3.6–5.5)
PROT SERPL-MCNC: 6 G/DL (ref 6–8.2)
PROT UR QL STRIP: 30 MG/DL
RBC # BLD AUTO: 3.69 M/UL (ref 4.2–5.4)
RBC # URNS HPF: ABNORMAL /HPF
RBC BLD AUTO: PRESENT
RBC UR QL AUTO: ABNORMAL
RENAL EPI CELLS #/AREA URNS HPF: ABNORMAL /HPF
RH BLD: NORMAL
SAO2 % BLDA: 95.8 % (ref 93–99)
SAO2 % BLDA: 98 % (ref 93–99)
SODIUM SERPL-SCNC: 140 MMOL/L (ref 135–145)
SP GR UR STRIP.AUTO: >=1.03
SPECIMEN DRAWN FROM PATIENT: ABNORMAL
TEG ALGORITHM TGALG: ABNORMAL
TIDAL VOLUME IVT: 330 ML
TRANS CELLS #/AREA URNS HPF: ABNORMAL /HPF
TSH SERPL DL<=0.005 MIU/L-ACNC: 4.59 UIU/ML (ref 0.38–5.33)
UROBILINOGEN UR STRIP.AUTO-MCNC: 0.2 MG/DL
WBC # BLD AUTO: 18.2 K/UL (ref 4.8–10.8)
WBC #/AREA URNS HPF: ABNORMAL /HPF

## 2023-11-05 PROCEDURE — 93970 EXTREMITY STUDY: CPT

## 2023-11-05 PROCEDURE — 700111 HCHG RX REV CODE 636 W/ 250 OVERRIDE (IP): Performed by: ANESTHESIOLOGY

## 2023-11-05 PROCEDURE — 700111 HCHG RX REV CODE 636 W/ 250 OVERRIDE (IP): Performed by: NURSE PRACTITIONER

## 2023-11-05 PROCEDURE — 85027 COMPLETE CBC AUTOMATED: CPT

## 2023-11-05 PROCEDURE — 94799 UNLISTED PULMONARY SVC/PX: CPT

## 2023-11-05 PROCEDURE — 82140 ASSAY OF AMMONIA: CPT

## 2023-11-05 PROCEDURE — 700105 HCHG RX REV CODE 258: Performed by: SURGERY

## 2023-11-05 PROCEDURE — 88307 TISSUE EXAM BY PATHOLOGIST: CPT

## 2023-11-05 PROCEDURE — 37799 UNLISTED PX VASCULAR SURGERY: CPT

## 2023-11-05 PROCEDURE — 160009 HCHG ANES TIME/MIN: Performed by: SURGERY

## 2023-11-05 PROCEDURE — 0DB80ZZ EXCISION OF SMALL INTESTINE, OPEN APPROACH: ICD-10-PCS | Performed by: SURGERY

## 2023-11-05 PROCEDURE — 44120 REMOVAL OF SMALL INTESTINE: CPT | Mod: AS | Performed by: NURSE PRACTITIONER

## 2023-11-05 PROCEDURE — 83880 ASSAY OF NATRIURETIC PEPTIDE: CPT

## 2023-11-05 PROCEDURE — 770022 HCHG ROOM/CARE - ICU (200)

## 2023-11-05 PROCEDURE — 44120 REMOVAL OF SMALL INTESTINE: CPT | Performed by: SURGERY

## 2023-11-05 PROCEDURE — 71045 X-RAY EXAM CHEST 1 VIEW: CPT

## 2023-11-05 PROCEDURE — 99223 1ST HOSP IP/OBS HIGH 75: CPT | Mod: AI | Performed by: INTERNAL MEDICINE

## 2023-11-05 PROCEDURE — 700105 HCHG RX REV CODE 258: Performed by: ANESTHESIOLOGY

## 2023-11-05 PROCEDURE — 160031 HCHG SURGERY MINUTES - 1ST 30 MINS LEVEL 5: Performed by: SURGERY

## 2023-11-05 PROCEDURE — 83605 ASSAY OF LACTIC ACID: CPT

## 2023-11-05 PROCEDURE — 700111 HCHG RX REV CODE 636 W/ 250 OVERRIDE (IP): Performed by: INTERNAL MEDICINE

## 2023-11-05 PROCEDURE — 93010 ELECTROCARDIOGRAM REPORT: CPT | Performed by: INTERNAL MEDICINE

## 2023-11-05 PROCEDURE — 700105 HCHG RX REV CODE 258: Performed by: STUDENT IN AN ORGANIZED HEALTH CARE EDUCATION/TRAINING PROGRAM

## 2023-11-05 PROCEDURE — 700101 HCHG RX REV CODE 250: Performed by: NURSE PRACTITIONER

## 2023-11-05 PROCEDURE — 160042 HCHG SURGERY MINUTES - EA ADDL 1 MIN LEVEL 5: Performed by: SURGERY

## 2023-11-05 PROCEDURE — 81001 URINALYSIS AUTO W/SCOPE: CPT

## 2023-11-05 PROCEDURE — 86850 RBC ANTIBODY SCREEN: CPT

## 2023-11-05 PROCEDURE — 86900 BLOOD TYPING SEROLOGIC ABO: CPT

## 2023-11-05 PROCEDURE — 700105 HCHG RX REV CODE 258: Performed by: INTERNAL MEDICINE

## 2023-11-05 PROCEDURE — 160048 HCHG OR STATISTICAL LEVEL 1-5: Performed by: SURGERY

## 2023-11-05 PROCEDURE — 82803 BLOOD GASES ANY COMBINATION: CPT

## 2023-11-05 PROCEDURE — 700111 HCHG RX REV CODE 636 W/ 250 OVERRIDE (IP): Mod: JZ | Performed by: SURGERY

## 2023-11-05 PROCEDURE — 86901 BLOOD TYPING SEROLOGIC RH(D): CPT

## 2023-11-05 PROCEDURE — 94002 VENT MGMT INPAT INIT DAY: CPT

## 2023-11-05 PROCEDURE — 700101 HCHG RX REV CODE 250: Performed by: ANESTHESIOLOGY

## 2023-11-05 PROCEDURE — 5A1945Z RESPIRATORY VENTILATION, 24-96 CONSECUTIVE HOURS: ICD-10-PCS | Performed by: SURGERY

## 2023-11-05 PROCEDURE — 99223 1ST HOSP IP/OBS HIGH 75: CPT | Mod: 57 | Performed by: SURGERY

## 2023-11-05 PROCEDURE — 85007 BL SMEAR W/DIFF WBC COUNT: CPT

## 2023-11-05 PROCEDURE — 84443 ASSAY THYROID STIM HORMONE: CPT

## 2023-11-05 PROCEDURE — 85384 FIBRINOGEN ACTIVITY: CPT

## 2023-11-05 PROCEDURE — 93005 ELECTROCARDIOGRAM TRACING: CPT | Performed by: INTERNAL MEDICINE

## 2023-11-05 PROCEDURE — 82962 GLUCOSE BLOOD TEST: CPT

## 2023-11-05 PROCEDURE — 80053 COMPREHEN METABOLIC PANEL: CPT

## 2023-11-05 PROCEDURE — 0DN80ZZ RELEASE SMALL INTESTINE, OPEN APPROACH: ICD-10-PCS | Performed by: SURGERY

## 2023-11-05 PROCEDURE — 97606 NEG PRS WND THER DME>50 SQCM: CPT | Performed by: SURGERY

## 2023-11-05 PROCEDURE — 700105 HCHG RX REV CODE 258: Performed by: NURSE PRACTITIONER

## 2023-11-05 PROCEDURE — 85576 BLOOD PLATELET AGGREGATION: CPT

## 2023-11-05 PROCEDURE — 85347 COAGULATION TIME ACTIVATED: CPT

## 2023-11-05 RX ORDER — ACETAMINOPHEN 650 MG/1
650 SUPPOSITORY RECTAL EVERY 4 HOURS PRN
Status: DISCONTINUED | OUTPATIENT
Start: 2023-11-05 | End: 2023-11-05

## 2023-11-05 RX ORDER — SODIUM CHLORIDE, SODIUM GLUCONATE, SODIUM ACETATE, POTASSIUM CHLORIDE AND MAGNESIUM CHLORIDE 526; 502; 368; 37; 30 MG/100ML; MG/100ML; MG/100ML; MG/100ML; MG/100ML
500 INJECTION, SOLUTION INTRAVENOUS CONTINUOUS
Status: DISCONTINUED | OUTPATIENT
Start: 2023-11-05 | End: 2023-11-05

## 2023-11-05 RX ORDER — HYDROMORPHONE HYDROCHLORIDE 1 MG/ML
0.4 INJECTION, SOLUTION INTRAMUSCULAR; INTRAVENOUS; SUBCUTANEOUS
Status: DISCONTINUED | OUTPATIENT
Start: 2023-11-05 | End: 2023-11-05

## 2023-11-05 RX ORDER — ACETAMINOPHEN 325 MG/1
650 TABLET ORAL EVERY 4 HOURS PRN
Status: DISCONTINUED | OUTPATIENT
Start: 2023-11-05 | End: 2023-11-21 | Stop reason: HOSPADM

## 2023-11-05 RX ORDER — SODIUM CHLORIDE 9 MG/ML
INJECTION, SOLUTION INTRAVENOUS CONTINUOUS
Status: DISCONTINUED | OUTPATIENT
Start: 2023-11-05 | End: 2023-11-05

## 2023-11-05 RX ORDER — DIPHENHYDRAMINE HYDROCHLORIDE 50 MG/ML
12.5 INJECTION INTRAMUSCULAR; INTRAVENOUS
Status: DISCONTINUED | OUTPATIENT
Start: 2023-11-05 | End: 2023-11-05

## 2023-11-05 RX ORDER — SODIUM CHLORIDE, SODIUM LACTATE, POTASSIUM CHLORIDE, CALCIUM CHLORIDE 600; 310; 30; 20 MG/100ML; MG/100ML; MG/100ML; MG/100ML
INJECTION, SOLUTION INTRAVENOUS
Status: DISCONTINUED | OUTPATIENT
Start: 2023-11-05 | End: 2023-11-05 | Stop reason: SURG

## 2023-11-05 RX ORDER — LIDOCAINE HYDROCHLORIDE 20 MG/ML
INJECTION, SOLUTION EPIDURAL; INFILTRATION; INTRACAUDAL; PERINEURAL PRN
Status: DISCONTINUED | OUTPATIENT
Start: 2023-11-05 | End: 2023-11-05 | Stop reason: SURG

## 2023-11-05 RX ORDER — ONDANSETRON 4 MG/1
4 TABLET, ORALLY DISINTEGRATING ORAL EVERY 4 HOURS PRN
Status: DISCONTINUED | OUTPATIENT
Start: 2023-11-05 | End: 2023-11-21 | Stop reason: HOSPADM

## 2023-11-05 RX ORDER — FAMOTIDINE 20 MG/1
20 TABLET, FILM COATED ORAL 2 TIMES DAILY
Status: DISCONTINUED | OUTPATIENT
Start: 2023-11-05 | End: 2023-11-05

## 2023-11-05 RX ORDER — MIDAZOLAM HYDROCHLORIDE 1 MG/ML
1 INJECTION INTRAMUSCULAR; INTRAVENOUS
Status: DISCONTINUED | OUTPATIENT
Start: 2023-11-05 | End: 2023-11-05

## 2023-11-05 RX ORDER — HYDROMORPHONE HYDROCHLORIDE 1 MG/ML
0.2 INJECTION, SOLUTION INTRAMUSCULAR; INTRAVENOUS; SUBCUTANEOUS
Status: DISCONTINUED | OUTPATIENT
Start: 2023-11-05 | End: 2023-11-05

## 2023-11-05 RX ORDER — ONDANSETRON 4 MG/1
4 TABLET, ORALLY DISINTEGRATING ORAL EVERY 4 HOURS PRN
Status: DISCONTINUED | OUTPATIENT
Start: 2023-11-05 | End: 2023-11-05

## 2023-11-05 RX ORDER — MEPERIDINE HYDROCHLORIDE 25 MG/ML
12.5 INJECTION INTRAMUSCULAR; INTRAVENOUS; SUBCUTANEOUS
Status: DISCONTINUED | OUTPATIENT
Start: 2023-11-05 | End: 2023-11-05

## 2023-11-05 RX ORDER — ONDANSETRON 2 MG/ML
INJECTION INTRAMUSCULAR; INTRAVENOUS PRN
Status: DISCONTINUED | OUTPATIENT
Start: 2023-11-05 | End: 2023-11-05 | Stop reason: SURG

## 2023-11-05 RX ORDER — ONDANSETRON 2 MG/ML
4 INJECTION INTRAMUSCULAR; INTRAVENOUS EVERY 4 HOURS PRN
Status: DISCONTINUED | OUTPATIENT
Start: 2023-11-05 | End: 2023-11-21 | Stop reason: HOSPADM

## 2023-11-05 RX ORDER — OXYCODONE HYDROCHLORIDE 10 MG/1
10 TABLET ORAL
Status: DISCONTINUED | OUTPATIENT
Start: 2023-11-05 | End: 2023-11-05

## 2023-11-05 RX ORDER — CALCIUM CHLORIDE 100 MG/ML
INJECTION INTRAVENOUS; INTRAVENTRICULAR PRN
Status: DISCONTINUED | OUTPATIENT
Start: 2023-11-05 | End: 2023-11-05 | Stop reason: SURG

## 2023-11-05 RX ORDER — HYDROMORPHONE HYDROCHLORIDE 1 MG/ML
1 INJECTION, SOLUTION INTRAMUSCULAR; INTRAVENOUS; SUBCUTANEOUS
Status: DISCONTINUED | OUTPATIENT
Start: 2023-11-05 | End: 2023-11-05

## 2023-11-05 RX ORDER — OXYCODONE HYDROCHLORIDE 5 MG/1
5 TABLET ORAL
Status: DISCONTINUED | OUTPATIENT
Start: 2023-11-05 | End: 2023-11-05

## 2023-11-05 RX ORDER — CEFOTETAN DISODIUM 2 G/20ML
INJECTION, POWDER, FOR SOLUTION INTRAMUSCULAR; INTRAVENOUS PRN
Status: DISCONTINUED | OUTPATIENT
Start: 2023-11-05 | End: 2023-11-05 | Stop reason: SURG

## 2023-11-05 RX ORDER — SODIUM CHLORIDE, SODIUM LACTATE, POTASSIUM CHLORIDE, AND CALCIUM CHLORIDE .6; .31; .03; .02 G/100ML; G/100ML; G/100ML; G/100ML
500 INJECTION, SOLUTION INTRAVENOUS ONCE
Status: COMPLETED | OUTPATIENT
Start: 2023-11-05 | End: 2023-11-06

## 2023-11-05 RX ORDER — ENOXAPARIN SODIUM 100 MG/ML
40 INJECTION SUBCUTANEOUS DAILY
Status: DISCONTINUED | OUTPATIENT
Start: 2023-11-05 | End: 2023-11-05

## 2023-11-05 RX ORDER — HYDROMORPHONE HYDROCHLORIDE 1 MG/ML
0.5 INJECTION, SOLUTION INTRAMUSCULAR; INTRAVENOUS; SUBCUTANEOUS
Status: DISCONTINUED | OUTPATIENT
Start: 2023-11-05 | End: 2023-11-05

## 2023-11-05 RX ORDER — PHENYLEPHRINE HYDROCHLORIDE 10 MG/ML
INJECTION, SOLUTION INTRAMUSCULAR; INTRAVENOUS; SUBCUTANEOUS PRN
Status: DISCONTINUED | OUTPATIENT
Start: 2023-11-05 | End: 2023-11-05 | Stop reason: SURG

## 2023-11-05 RX ORDER — NOREPINEPHRINE BITARTRATE 0.03 MG/ML
0-1 INJECTION, SOLUTION INTRAVENOUS CONTINUOUS
Status: DISCONTINUED | OUTPATIENT
Start: 2023-11-05 | End: 2023-11-08

## 2023-11-05 RX ORDER — SODIUM CHLORIDE, SODIUM GLUCONATE, SODIUM ACETATE, POTASSIUM CHLORIDE AND MAGNESIUM CHLORIDE 526; 502; 368; 37; 30 MG/100ML; MG/100ML; MG/100ML; MG/100ML; MG/100ML
INJECTION, SOLUTION INTRAVENOUS
Status: DISCONTINUED | OUTPATIENT
Start: 2023-11-05 | End: 2023-11-05 | Stop reason: SURG

## 2023-11-05 RX ORDER — BISACODYL 10 MG
10 SUPPOSITORY, RECTAL RECTAL
Status: DISCONTINUED | OUTPATIENT
Start: 2023-11-05 | End: 2023-11-21 | Stop reason: HOSPADM

## 2023-11-05 RX ORDER — ONDANSETRON 2 MG/ML
4 INJECTION INTRAMUSCULAR; INTRAVENOUS EVERY 4 HOURS PRN
Status: DISCONTINUED | OUTPATIENT
Start: 2023-11-05 | End: 2023-11-05

## 2023-11-05 RX ORDER — AMOXICILLIN 250 MG
1 CAPSULE ORAL NIGHTLY
Status: DISCONTINUED | OUTPATIENT
Start: 2023-11-05 | End: 2023-11-05

## 2023-11-05 RX ORDER — OXYCODONE HCL 5 MG/5 ML
10 SOLUTION, ORAL ORAL
Status: DISCONTINUED | OUTPATIENT
Start: 2023-11-05 | End: 2023-11-05

## 2023-11-05 RX ORDER — DOCUSATE SODIUM 100 MG/1
100 CAPSULE, LIQUID FILLED ORAL 2 TIMES DAILY
Status: DISCONTINUED | OUTPATIENT
Start: 2023-11-05 | End: 2023-11-05

## 2023-11-05 RX ORDER — SODIUM CHLORIDE, SODIUM LACTATE, POTASSIUM CHLORIDE, CALCIUM CHLORIDE 600; 310; 30; 20 MG/100ML; MG/100ML; MG/100ML; MG/100ML
INJECTION, SOLUTION INTRAVENOUS CONTINUOUS
Status: DISCONTINUED | OUTPATIENT
Start: 2023-11-05 | End: 2023-11-11

## 2023-11-05 RX ORDER — HYDROMORPHONE HYDROCHLORIDE 1 MG/ML
0.5 INJECTION, SOLUTION INTRAMUSCULAR; INTRAVENOUS; SUBCUTANEOUS
Status: DISCONTINUED | OUTPATIENT
Start: 2023-11-05 | End: 2023-11-08

## 2023-11-05 RX ORDER — NALOXONE HYDROCHLORIDE 0.4 MG/ML
INJECTION, SOLUTION INTRAMUSCULAR; INTRAVENOUS; SUBCUTANEOUS
Status: COMPLETED
Start: 2023-11-05 | End: 2023-11-05

## 2023-11-05 RX ORDER — ENEMA 19; 7 G/133ML; G/133ML
1 ENEMA RECTAL
Status: DISCONTINUED | OUTPATIENT
Start: 2023-11-05 | End: 2023-11-21 | Stop reason: HOSPADM

## 2023-11-05 RX ORDER — POLYETHYLENE GLYCOL 3350 17 G/17G
1 POWDER, FOR SOLUTION ORAL 2 TIMES DAILY
Status: DISCONTINUED | OUTPATIENT
Start: 2023-11-05 | End: 2023-11-05

## 2023-11-05 RX ORDER — OXYCODONE HCL 5 MG/5 ML
5 SOLUTION, ORAL ORAL
Status: DISCONTINUED | OUTPATIENT
Start: 2023-11-05 | End: 2023-11-05

## 2023-11-05 RX ORDER — SODIUM CHLORIDE 9 MG/ML
1000 INJECTION, SOLUTION INTRAVENOUS ONCE
Status: COMPLETED | OUTPATIENT
Start: 2023-11-05 | End: 2023-11-06

## 2023-11-05 RX ORDER — EPHEDRINE SULFATE 50 MG/ML
INJECTION, SOLUTION INTRAVENOUS PRN
Status: DISCONTINUED | OUTPATIENT
Start: 2023-11-05 | End: 2023-11-05 | Stop reason: SURG

## 2023-11-05 RX ORDER — DEXAMETHASONE SODIUM PHOSPHATE 4 MG/ML
INJECTION, SOLUTION INTRA-ARTICULAR; INTRALESIONAL; INTRAMUSCULAR; INTRAVENOUS; SOFT TISSUE PRN
Status: DISCONTINUED | OUTPATIENT
Start: 2023-11-05 | End: 2023-11-05 | Stop reason: SURG

## 2023-11-05 RX ORDER — ACETAMINOPHEN 650 MG/1
650 SUPPOSITORY RECTAL EVERY 4 HOURS PRN
Status: DISCONTINUED | OUTPATIENT
Start: 2023-11-05 | End: 2023-11-12

## 2023-11-05 RX ORDER — NALOXONE HYDROCHLORIDE 0.4 MG/ML
0.4 INJECTION, SOLUTION INTRAMUSCULAR; INTRAVENOUS; SUBCUTANEOUS ONCE
Status: DISCONTINUED | OUTPATIENT
Start: 2023-11-05 | End: 2023-11-05

## 2023-11-05 RX ORDER — HEPARIN SODIUM 5000 [USP'U]/ML
5000 INJECTION, SOLUTION INTRAVENOUS; SUBCUTANEOUS EVERY 8 HOURS
Status: DISCONTINUED | OUTPATIENT
Start: 2023-11-06 | End: 2023-11-05

## 2023-11-05 RX ORDER — LIDOCAINE HYDROCHLORIDE 40 MG/ML
SOLUTION TOPICAL PRN
Status: DISCONTINUED | OUTPATIENT
Start: 2023-11-05 | End: 2023-11-05 | Stop reason: SURG

## 2023-11-05 RX ORDER — IPRATROPIUM BROMIDE AND ALBUTEROL SULFATE 2.5; .5 MG/3ML; MG/3ML
3 SOLUTION RESPIRATORY (INHALATION)
Status: DISCONTINUED | OUTPATIENT
Start: 2023-11-05 | End: 2023-11-05

## 2023-11-05 RX ORDER — SODIUM CHLORIDE 9 MG/ML
1000 INJECTION, SOLUTION INTRAVENOUS ONCE
Status: DISCONTINUED | OUTPATIENT
Start: 2023-11-05 | End: 2023-11-05

## 2023-11-05 RX ORDER — METRONIDAZOLE 500 MG/100ML
500 INJECTION, SOLUTION INTRAVENOUS EVERY 12 HOURS
Status: DISCONTINUED | OUTPATIENT
Start: 2023-11-05 | End: 2023-11-05

## 2023-11-05 RX ORDER — ONDANSETRON 2 MG/ML
4 INJECTION INTRAMUSCULAR; INTRAVENOUS
Status: DISCONTINUED | OUTPATIENT
Start: 2023-11-05 | End: 2023-11-05

## 2023-11-05 RX ORDER — AMOXICILLIN 250 MG
1 CAPSULE ORAL
Status: DISCONTINUED | OUTPATIENT
Start: 2023-11-05 | End: 2023-11-05

## 2023-11-05 RX ORDER — ROCURONIUM BROMIDE 10 MG/ML
INJECTION, SOLUTION INTRAVENOUS PRN
Status: DISCONTINUED | OUTPATIENT
Start: 2023-11-05 | End: 2023-11-05 | Stop reason: SURG

## 2023-11-05 RX ORDER — HALOPERIDOL 5 MG/ML
1 INJECTION INTRAMUSCULAR
Status: DISCONTINUED | OUTPATIENT
Start: 2023-11-05 | End: 2023-11-05

## 2023-11-05 RX ORDER — ACETAMINOPHEN 325 MG/1
650 TABLET ORAL EVERY 4 HOURS PRN
Status: DISCONTINUED | OUTPATIENT
Start: 2023-11-05 | End: 2023-11-05

## 2023-11-05 RX ORDER — NALOXONE HYDROCHLORIDE 0.4 MG/ML
0.2 INJECTION, SOLUTION INTRAMUSCULAR; INTRAVENOUS; SUBCUTANEOUS ONCE
Status: COMPLETED | OUTPATIENT
Start: 2023-11-05 | End: 2023-11-05

## 2023-11-05 RX ADMIN — SODIUM CHLORIDE, POTASSIUM CHLORIDE, SODIUM LACTATE AND CALCIUM CHLORIDE: 600; 310; 30; 20 INJECTION, SOLUTION INTRAVENOUS at 16:27

## 2023-11-05 RX ADMIN — CEFOTETAN DISODIUM 2 G: 2 INJECTION, POWDER, FOR SOLUTION INTRAMUSCULAR; INTRAVENOUS at 16:36

## 2023-11-05 RX ADMIN — CALCIUM CHLORIDE 1 G: 100 INJECTION, SOLUTION INTRAVENOUS; INTRAVENTRICULAR at 16:43

## 2023-11-05 RX ADMIN — PROPOFOL 50 MCG/KG/MIN: 10 INJECTION, EMULSION INTRAVENOUS at 17:36

## 2023-11-05 RX ADMIN — NALOXONE HYDROCHLORIDE 0.2 MG: 0.4 INJECTION, SOLUTION INTRAMUSCULAR; INTRAVENOUS; SUBCUTANEOUS at 13:46

## 2023-11-05 RX ADMIN — EPHEDRINE SULFATE 10 MG: 50 INJECTION INTRAVENOUS at 16:41

## 2023-11-05 RX ADMIN — SODIUM CHLORIDE, SODIUM GLUCONATE, SODIUM ACETATE, POTASSIUM CHLORIDE AND MAGNESIUM CHLORIDE: 526; 502; 368; 37; 30 INJECTION, SOLUTION INTRAVENOUS at 17:36

## 2023-11-05 RX ADMIN — LIDOCAINE HYDROCHLORIDE 4 ML: 40 SOLUTION TOPICAL at 16:35

## 2023-11-05 RX ADMIN — PROPOFOL 5 MCG/KG/MIN: 10 INJECTION, EMULSION INTRAVENOUS at 20:51

## 2023-11-05 RX ADMIN — SODIUM CHLORIDE: 9 INJECTION, SOLUTION INTRAVENOUS at 14:36

## 2023-11-05 RX ADMIN — PROPOFOL 60 MG: 10 INJECTION, EMULSION INTRAVENOUS at 16:35

## 2023-11-05 RX ADMIN — HYDROMORPHONE HYDROCHLORIDE 0.5 MG: 1 INJECTION, SOLUTION INTRAMUSCULAR; INTRAVENOUS; SUBCUTANEOUS at 23:05

## 2023-11-05 RX ADMIN — NOREPINEPHRINE BITARTRATE 0.02 MCG/KG/MIN: 1 INJECTION, SOLUTION, CONCENTRATE INTRAVENOUS at 17:12

## 2023-11-05 RX ADMIN — SODIUM CHLORIDE, SODIUM GLUCONATE, SODIUM ACETATE, POTASSIUM CHLORIDE AND MAGNESIUM CHLORIDE: 526; 502; 368; 37; 30 INJECTION, SOLUTION INTRAVENOUS at 17:18

## 2023-11-05 RX ADMIN — PIPERACILLIN AND TAZOBACTAM 4.5 G: 4; .5 INJECTION, POWDER, FOR SOLUTION INTRAVENOUS at 20:00

## 2023-11-05 RX ADMIN — ONDANSETRON 4 MG: 2 INJECTION INTRAMUSCULAR; INTRAVENOUS at 17:38

## 2023-11-05 RX ADMIN — PHENYLEPHRINE HYDROCHLORIDE 100 MCG: 10 INJECTION INTRAVENOUS at 16:39

## 2023-11-05 RX ADMIN — ROCURONIUM BROMIDE 80 MG: 50 INJECTION, SOLUTION INTRAVENOUS at 16:35

## 2023-11-05 RX ADMIN — PIPERACILLIN AND TAZOBACTAM 4.5 G: 4; .5 INJECTION, POWDER, FOR SOLUTION INTRAVENOUS at 23:03

## 2023-11-05 RX ADMIN — SODIUM CHLORIDE, SODIUM GLUCONATE, SODIUM ACETATE, POTASSIUM CHLORIDE AND MAGNESIUM CHLORIDE: 526; 502; 368; 37; 30 INJECTION, SOLUTION INTRAVENOUS at 16:48

## 2023-11-05 RX ADMIN — LIDOCAINE HYDROCHLORIDE 30 MG: 20 INJECTION, SOLUTION EPIDURAL; INFILTRATION; INTRACAUDAL at 16:35

## 2023-11-05 RX ADMIN — FAMOTIDINE 20 MG: 10 INJECTION, SOLUTION INTRAVENOUS at 20:10

## 2023-11-05 RX ADMIN — SODIUM CHLORIDE, POTASSIUM CHLORIDE, SODIUM LACTATE AND CALCIUM CHLORIDE 500 ML: 600; 310; 30; 20 INJECTION, SOLUTION INTRAVENOUS at 22:58

## 2023-11-05 RX ADMIN — PHENYLEPHRINE HYDROCHLORIDE 200 MCG: 10 INJECTION INTRAVENOUS at 16:41

## 2023-11-05 RX ADMIN — SODIUM CHLORIDE 1000 ML: 9 INJECTION, SOLUTION INTRAVENOUS at 15:26

## 2023-11-05 RX ADMIN — HYDROMORPHONE HYDROCHLORIDE 0.5 MG: 1 INJECTION, SOLUTION INTRAMUSCULAR; INTRAVENOUS; SUBCUTANEOUS at 20:05

## 2023-11-05 RX ADMIN — NOREPINEPHRINE BITARTRATE 0.05 MCG/KG/MIN: 1 INJECTION, SOLUTION, CONCENTRATE INTRAVENOUS at 20:55

## 2023-11-05 RX ADMIN — NOREPINEPHRINE BITARTRATE 0.02 MCG/KG/MIN: 1 INJECTION, SOLUTION, CONCENTRATE INTRAVENOUS at 17:38

## 2023-11-05 RX ADMIN — DEXAMETHASONE SODIUM PHOSPHATE 8 MG: 4 INJECTION INTRA-ARTICULAR; INTRALESIONAL; INTRAMUSCULAR; INTRAVENOUS; SOFT TISSUE at 16:34

## 2023-11-05 ASSESSMENT — PAIN SCALES - GENERAL: PAIN_LEVEL: 0

## 2023-11-05 ASSESSMENT — PAIN DESCRIPTION - PAIN TYPE: TYPE: ACUTE PAIN;SURGICAL PAIN

## 2023-11-05 ASSESSMENT — ENCOUNTER SYMPTOMS: ABDOMINAL PAIN: 1

## 2023-11-05 NOTE — PROGRESS NOTES
[facility] TRIAGE OFFICER DIRECT ADMISSION REPORT  Transferring facility: [facility]  Transferring physician: Dr. York   Transferring facility/physician contact number: Dr. Ochoa  Chief complaint: abdominal pain nausea and vomiting  Pertinent history & patient course: 78-year-old female past medical history of endometrial cancer status post pelvic radiation with multiple prior small bowel obstructions resolved with conservative management presents with abdominal pain with associated nausea and vomiting.  CT imaging from admission showing dilated small bowel loops suggestive of obstruction and small intraperitoneal fluid.  This is a preliminary read by physician not official read as there fiberoptic cables have been cut.  Images unable to be sent.  General surgery consulted prior to arrival by [facility] accepted for transfer.  General surgery be consulted prior to arrival.  NG tube has been inserted at outside facility with minimal output.  Patient hemodynamically stable.  Pertinent imaging & lab results: Potassium 4.9, creatinine 1.8, currently requiring 2 L of oxygen supplementation not on home oxygen.  WBC count increased from 14 K to 18 K.   Code Status: Full   Further work up or recommendations per triage officer prior to transfer: None  Consultants called prior to transfer and pertinent input from consultants: General Surgery, Dr. York    Addendum -Discussed with Dr. York in person regarding this transfer.  He reported to obtain complete set of labs upon arrival including lactic acid.  Also mentioned  to repeat CT abdomen pelvis in-house at [facility].  Patient accepted for transfer: Yes  Consultants to be called upon arrival: Gen Surg  Admission status: Inpatient.   Floor requested: Surgical  If ICU transfer, name of intensivist case discussed with and pertinent input from critical care: none         Please inform the triage officer upon arrival of the patient to [facility] for  assignment of a hospitalist to perform admission.      For any question or concerns regarding the care of this patient, please reach out to the assigned hospitalist.

## 2023-11-05 NOTE — H&P
Hospital Medicine History & Physical Note    Date of Service  11/5/2023    Primary Care Physician  Sheng Negro M.D. (Inactive)    Consultants  Dr. York    Code Status  Full Code    Chief Complaint  No chief complaint on file.      History of Presenting Illness  Rubi Haq is a 78 y.o. female who presented 11/5/2023 with history of endometrial cancer with hysterectomy and chemoradiation 2011, prior sigmoid colectomy,  with recurring bowel obstruction who presented with abdominal pain and nausea vomiting to [facility] ER yesterday.  CT AP appeared to have SBO, was not officially read by radiology due to Internet issues.  Dr. York was consulted and recommended transfer.  NG tube was placed for decompression, but it was removed prior to discharge.  Lactic acid was 1.3 prior to arrival.  On arrival, patient is lethargic, oriented to self and [place] but unable to tell me any other history. She also says she lives with her , who apparently had passed away. I spoke with her daughter and son who says patient is AOX3 baseline and was sleepy this morning before transfer but still answering questions appropriately. Med rec shows she takes gabapentin chronically, no other sedating medications. She was ordered for oxycodone and dilaudid in [place] and received fentanyl in ambulance during transfer.  She was given narcan with some improvement in alertness, says her abd hurts and she feels hot.  She has some chronic leg edema since her hysterectomy.    I discussed the plan of care with patient and family.    Review of Systems  Review of Systems   Unable to perform ROS: Mental acuity (lethargic)   Gastrointestinal:  Positive for abdominal pain.       Past Medical History   has a past medical history of Arthritis, Cancer (HCC) (2011), Cancer (HCC), Disorder of thyroid, High cholesterol, and Pain.    Surgical History   has a past surgical history that includes other orthopedic surgery (2002); other orthopedic  surgery (2007); other orthopedic surgery (2007); other orthopedic surgery (2010); other orthopedic surgery (2010); gyn surgery (1983); gyn surgery (2011); other (2010); other (2014); other (2014); other (2015); other (2018); fusion, spine, lumbar, plif (6/10/2019); and lumbar laminectomy diskectomy (6/10/2019).     Family History  family history is not on file.     Social History   reports that she has never smoked. She has never used smokeless tobacco. She reports current alcohol use. She reports that she does not use drugs.    Allergies  Allergies   Allergen Reactions    Nickel Rash     Rash from thierno    Tramadol      With drawl like symptom after stopped taking       Medications  Prior to Admission Medications   Prescriptions Last Dose Informant Patient Reported? Taking?   Cholecalciferol (VITAMIN D3) 2000 UNIT Cap  Patient Yes No   Sig: Take 1 Cap by mouth every day.   acetaminophen (TYLENOL) 500 MG Tab  Patient Yes No   Sig: Take 500-1,000 mg by mouth every 6 hours as needed.   ferrous sulfate 325 (65 Fe) MG tablet  Patient Yes No   Sig: Take 325 mg by mouth every day.   gabapentin (NEURONTIN) 300 MG Cap  Patient Yes No   Sig: Take 300 mg by mouth 4 times a day.   levothyroxine (SYNTHROID) 25 MCG Tab  Patient Yes No   Sig: Take 25 mcg by mouth every evening.   polyethylene glycol/lytes (MIRALAX) Pack   No No   Sig: Take 1 Packet by mouth 2 times a day as needed (if sennosides and/or docusate ineffective or not ordered).   senna-docusate (PERICOLACE OR SENOKOT S) 8.6-50 MG Tab   No No   Sig: Take 1 Tab by mouth every 24 hours as needed for Constipation.   simvastatin (ZOCOR) 40 MG Tab  Patient Yes No   Sig: Take 40 mg by mouth every evening.   therapeutic multivitamin-minerals (THERAGRAN-M) Tab  Patient Yes No   Sig: Take 1 Tab by mouth every day.   tizanidine (ZANAFLEX) 2 MG tablet   No No   Sig: Take 1 Tab by mouth 3 times a day as needed.      Facility-Administered Medications: None       Physical  "Exam  Temp:  [36.7 °C (98.1 °F)-37.4 °C (99.3 °F)] 37.4 °C (99.3 °F)  Pulse:  [] 100  Resp:  [18-20] 20  BP: (107)/(47-54) 107/47  SpO2:  [98 %-99 %] 99 %  Blood Pressure : 107/53   Temperature: 37.2 °C (99 °F)   Pulse: 99   Respiration: 18   Pulse Oximetry: 99 %       Physical Exam  Constitutional:       Comments: Sleepy awakes to stimuli but quickly falls asleep   HENT:      Head: Normocephalic.      Mouth/Throat:      Mouth: Mucous membranes are dry.   Eyes:      Pupils: Pupils are equal, round, and reactive to light.   Cardiovascular:      Rate and Rhythm: Normal rate and regular rhythm.   Pulmonary:      Effort: No respiratory distress.      Breath sounds: No wheezing or rales.      Comments: Poor inspiratory effort  Abdominal:      General: There is distension.      Tenderness: There is abdominal tenderness (diffues). There is guarding.      Comments: Firm, hypoactive bowel sounds   Musculoskeletal:      Cervical back: Neck supple.      Right lower leg: Edema present.      Left lower leg: Edema present.   Skin:     General: Skin is warm and dry.      Coloration: Skin is pale.   Neurological:      Comments: Oriented to self, hometown, daughter and son's names  No facial droop, slow to answer questions. Withdraws both arms to pain and shifting both legs weakly         Laboratory:  Recent Labs     11/05/23  1432   WBC 18.2*   RBC 3.69*   HEMOGLOBIN 11.5*   HEMATOCRIT 36.5*   MCV 98.9*   MCH 31.2   MCHC 31.5*   RDW 50.2*   PLATELETCT 240   MPV 10.2     Recent Labs     11/05/23  1432   SODIUM 140   POTASSIUM 5.1   CHLORIDE 104   CO2 21   GLUCOSE 101*   BUN 36*   CREATININE 2.13*   CALCIUM 8.9     Recent Labs     11/05/23  1432   ALTSGPT 18   ASTSGOT 34   ALKPHOSPHAT 51   TBILIRUBIN 0.4   GLUCOSE 101*         Recent Labs     11/05/23  1432   NTPROBNP 7276*         No results for input(s): \"TROPONINT\" in the last 72 hours.    Imaging:  OUTSIDE IMAGES-CT ABDOMEN /PELVIS   Final Result      OUTSIDE IMAGES-DX " CHEST   Final Result      DX-CHEST-PORTABLE (1 VIEW)   Final Result      1.  There is no acute cardiopulmonary process.      CT-HEAD W/O    (Results Pending)   US-EXTREMITY VENOUS LOWER BILAT    (Results Pending)   EC-ECHOCARDIOGRAM COMPLETE W/ CONT    (Results Pending)         Assessment/Plan:  Justification for Admission Status  I anticipate this patient will require at least two midnights for appropriate medical management, necessitating inpatient admission because SBO needing surgical consult, encephalopathy workup and treatment    Patient will need a Med/Surg bed on SURGICAL service .  The need is secondary to SBO needing surgical consult, encephalopathy workup and treatment.    * Small bowel obstruction (HCC)- (present on admission)  Assessment & Plan  Recurring, CT reported to show SBO  WBC is 18 and lactic acid 3.9  Dr. York consulted, given severity of her illness, plan for OR today and ICU post-op  Zosyn, NPO, IVF     Encephalopathy  Assessment & Plan  Patient was AOx3 prior to transfer, now very lethargic and confused  Workup consistent with sepsis encephalopathy  F/u CTH    CARMITA (acute kidney injury) (HCC)  Assessment & Plan  Worsening renal function  Arshad cath placed for retention    Sepsis (HCC)  Assessment & Plan  This is Sepsis Present on admission  SIRS criteria identified on my evaluation include: Tachycardia, with heart rate greater than 90 BPM and Leukocytosis, with WBC greater than 12,000  Clinical indicators of end organ dysfunction include Toxic Metabolic Encephalopathy and Acute Renal Failure, with a finding of creatinine greater than 2 (patient does not have ESRD or CKD  Source is abdominal  Sepsis protocol initiated  Crystalloid Fluid Administration: Fluid resuscitation ordered 1L because of suspected fluid overload and heart failure  IV antibiotics as appropriate for source of sepsis  Reassessment: I have reassessed the patient's hemodynamic status  Zosyn and blood cultures  ordered  Trend lactic    HLD (hyperlipidemia)  Assessment & Plan  Resume statin when able to take orals    Bilateral leg edema  Assessment & Plan  Chronic since hysterectomy but seems increased per daughter  Dopplers ordered to r/u DVT  BNP high, TTE ordered    History of endometrial cancer  Assessment & Plan  Prior hysterectomy and chemoradiation 2011    Hypothyroid  Assessment & Plan  TSH pending  Resume synthroid 25mcg when able to take orals    Addendum: I updated Dr. York regarding doppler with positive DVT result    VTE prophylaxis: enoxaparin ppx

## 2023-11-05 NOTE — ASSESSMENT & PLAN NOTE
Recurring, CT reported to show SBO  WBC is 18 and lactic acid 3.9  Dr. York consulted, given severity of her illness, plan for OR today and ICU post-op  Zosyn, NPO, IVF

## 2023-11-05 NOTE — H&P
CHIEF COMPLAINT:     HISTORY OF PRESENT ILLNESS: 78 year old female with history of GYN malignancy status post DAQUAN and lymphadenectomy who has had SBO in the past. She underwent lysis of adhesions 1 1/2 years ago. She was admitted to [facility] yesterday after CT revealed small bowel obstruction. Today, abdominal exam is concerning, WBC and lactic acid are up as well as creatinine. She has become delirious and is no longer talking with staff.    PAST MEDICAL HISTORY:  has a past medical history of Arthritis, Cancer (HCC) (2011), Cancer (HCC), Disorder of thyroid, High cholesterol, and Pain.    PAST SURGICAL HISTORY:  has a past surgical history that includes other orthopedic surgery (2002); other orthopedic surgery (2007); other orthopedic surgery (2007); other orthopedic surgery (2010); other orthopedic surgery (2010); gyn surgery (1983); gyn surgery (2011); other (2010); other (2014); other (2014); other (2015); other (2018); fusion, spine, lumbar, plif (6/10/2019); and lumbar laminectomy diskectomy (6/10/2019).    ALLERGIES:   Allergies   Allergen Reactions    Nickel Rash     Rash from thierno    Tramadol      With drawl like symptom after stopped taking       CURRENT MEDICATIONS:   Home Medications    **Home medications have not yet been reviewed for this encounter**       FAMILY HISTORY: family history is not on file.    SOCIAL HISTORY:  reports that she has never smoked. She has never used smokeless tobacco. She reports current alcohol use. She reports that she does not use drugs.    REVIEW OF SYSTEMS: Comprehensive review of systems is not able to be elicited from the patient secondary to the acuity of the clinical situation.    PHYSICAL EXAMINATION:      Vital Signs: /47   Pulse 100   Temp 37.4 °C (99.3 °F) (Temporal)   Resp 20   Wt 69 kg (152 lb 1.9 oz)   SpO2 99%   Physical Exam  Vitals and nursing note reviewed.   Constitutional:       General: She is sleeping.      Appearance: She is  cachectic. She is ill-appearing.   HENT:      Head: Normocephalic and atraumatic.      Mouth/Throat:      Mouth: Mucous membranes are dry.      Pharynx: Oropharynx is clear.   Eyes:      Conjunctiva/sclera: Conjunctivae normal.      Pupils: Pupils are equal, round, and reactive to light.   Cardiovascular:      Rate and Rhythm: Regular rhythm. Tachycardia present.      Pulses: Normal pulses.   Pulmonary:      Effort: Pulmonary effort is normal.      Breath sounds: No stridor. No wheezing.   Abdominal:      Comments: Diffusely tender abdomen  Rigid with guarding  Old healed midline scar without hernia       Genitourinary:     Comments: Arshad with dark urine  Musculoskeletal:         General: Normal range of motion.      Cervical back: Neck supple.      Right lower leg: Edema present.      Left lower leg: Edema present.   Neurological:      Mental Status: She is disoriented.      Comments: Moves all extremities  Nonverbal         LABORATORY VALUES:   Recent Labs     11/05/23  1432   WBC 18.2*   RBC 3.69*   HEMOGLOBIN 11.5*   HEMATOCRIT 36.5*   MCV 98.9*   MCH 31.2   MCHC 31.5*   RDW 50.2*   PLATELETCT 240   MPV 10.2     Recent Labs     11/05/23  1432   SODIUM 140   POTASSIUM 5.1   CHLORIDE 104   CO2 21   GLUCOSE 101*   BUN 36*   CREATININE 2.13*   CALCIUM 8.9     Recent Labs     11/05/23  1432   ASTSGOT 34   ALTSGPT 18   TBILIRUBIN 0.4   ALKPHOSPHAT 51   GLOBULIN 2.8   AMMONIA 19            IMAGING:   OUTSIDE IMAGES-CT ABDOMEN /PELVIS   Final Result      OUTSIDE IMAGES-DX CHEST   Final Result      DX-CHEST-PORTABLE (1 VIEW)   Final Result      1.  There is no acute cardiopulmonary process.      CT-HEAD W/O    (Results Pending)   US-EXTREMITY VENOUS LOWER BILAT    (Results Pending)   EC-ECHOCARDIOGRAM COMPLETE W/ CONT    (Results Pending)       ASSESSMENT AND PLAN:   78 year old female with bowel obstruction, peritonitis with worsening labs, metabolic acidosis and elevated serum creatinine.    Plan for rapid  resuscitation, exploratory laparotomy and transfer to SICU post op.     I explained the situation to her son, who is her POA and he understands the urgency. I described the procedure of exploratory laparotomy with the possible need for bowel resection. The primary risks are infection, bowel injury, bleeding, post op multisystem organ failure, cardiopulmonary complication, potential need for open abdomen as well as the risks of prolonged ICU stay.    He will sign consent.    Encephalopathy  Patient was AOx3 prior to transfer, now very lethargic and confused  Possible related to narcotic medication? some response to narcan  Stat CBC, CMP, ammonia, ABG, TSH, EKG, CXR, and CTH ordered    History of endometrial cancer  Prior hysterectomy and chemoradiation 2011    Hypothyroid  TSH pending  Resume synthroid 25mcg when able to take orals    Small bowel obstruction (HCC)  Recurring, CT reported to show SBO  CT being uploaded, Dr. York aware of patient's arrival  CBC, CMP, and lactic are pending  She does not have NGT currently, follow up Dr. York's recs  IVF and NPO for now    Bilateral leg edema  Chronic since hysterectomy but seems increased per daughter  Dopplers ordered to r/u DVT  BNP ordered    HLD (hyperlipidemia)  Resume statin when able to take orals           ____________________________________     Doroteo York D.O.    DD: 11/5/2023  3:50 PM

## 2023-11-05 NOTE — ASSESSMENT & PLAN NOTE
Patient was AOx3 prior to transfer, now very lethargic and confused  Workup consistent with sepsis encephalopathy  F/u CTH

## 2023-11-05 NOTE — ASSESSMENT & PLAN NOTE
This is Sepsis Present on admission  SIRS criteria identified on my evaluation include: Tachycardia, with heart rate greater than 90 BPM and Leukocytosis, with WBC greater than 12,000  Clinical indicators of end organ dysfunction include Toxic Metabolic Encephalopathy and Acute Renal Failure, with a finding of creatinine greater than 2 (patient does not have ESRD or CKD  Source is abdominal  Sepsis protocol initiated  Crystalloid Fluid Administration: Fluid resuscitation ordered 1L because of suspected fluid overload and heart failure  IV antibiotics as appropriate for source of sepsis  Reassessment: I have reassessed the patient's hemodynamic status  Zosyn and blood cultures ordered  Trend lactic

## 2023-11-05 NOTE — ASSESSMENT & PLAN NOTE
Chronic since hysterectomy but seems increased per daughter  Dopplers ordered to r/u DVT  BNP high, TTE ordered

## 2023-11-06 ENCOUNTER — APPOINTMENT (OUTPATIENT)
Dept: RADIOLOGY | Facility: MEDICAL CENTER | Age: 78
DRG: 853 | End: 2023-11-06
Attending: INTERNAL MEDICINE
Payer: MEDICARE

## 2023-11-06 ENCOUNTER — APPOINTMENT (OUTPATIENT)
Dept: CARDIOLOGY | Facility: MEDICAL CENTER | Age: 78
DRG: 853 | End: 2023-11-06
Attending: INTERNAL MEDICINE
Payer: MEDICARE

## 2023-11-06 ENCOUNTER — APPOINTMENT (OUTPATIENT)
Dept: RADIOLOGY | Facility: MEDICAL CENTER | Age: 78
DRG: 853 | End: 2023-11-06
Attending: STUDENT IN AN ORGANIZED HEALTH CARE EDUCATION/TRAINING PROGRAM
Payer: MEDICARE

## 2023-11-06 LAB
ALBUMIN SERPL BCP-MCNC: 2.5 G/DL (ref 3.2–4.9)
ALBUMIN/GLOB SERPL: 1.1 G/DL
ALP SERPL-CCNC: 45 U/L (ref 30–99)
ALT SERPL-CCNC: 16 U/L (ref 2–50)
ANION GAP SERPL CALC-SCNC: 13 MMOL/L (ref 7–16)
AST SERPL-CCNC: 29 U/L (ref 12–45)
BASOPHILS # BLD AUTO: 0 % (ref 0–1.8)
BASOPHILS # BLD: 0 K/UL (ref 0–0.12)
BILIRUB SERPL-MCNC: 0.4 MG/DL (ref 0.1–1.5)
BUN SERPL-MCNC: 37 MG/DL (ref 8–22)
BURR CELLS BLD QL SMEAR: NORMAL
CALCIUM ALBUM COR SERPL-MCNC: 9.7 MG/DL (ref 8.5–10.5)
CALCIUM SERPL-MCNC: 8.5 MG/DL (ref 8.5–10.5)
CHLORIDE SERPL-SCNC: 105 MMOL/L (ref 96–112)
CO2 SERPL-SCNC: 21 MMOL/L (ref 20–33)
CORTIS SERPL-MCNC: 20.2 UG/DL (ref 0–23)
CREAT SERPL-MCNC: 1.66 MG/DL (ref 0.5–1.4)
EOSINOPHIL # BLD AUTO: 0 K/UL (ref 0–0.51)
EOSINOPHIL NFR BLD: 0 % (ref 0–6.9)
ERYTHROCYTE [DISTWIDTH] IN BLOOD BY AUTOMATED COUNT: 50.9 FL (ref 35.9–50)
GFR SERPLBLD CREATININE-BSD FMLA CKD-EPI: 31 ML/MIN/1.73 M 2
GLOBULIN SER CALC-MCNC: 2.3 G/DL (ref 1.9–3.5)
GLUCOSE BLD STRIP.AUTO-MCNC: 89 MG/DL (ref 65–99)
GLUCOSE BLD STRIP.AUTO-MCNC: 90 MG/DL (ref 65–99)
GLUCOSE SERPL-MCNC: 121 MG/DL (ref 65–99)
HCT VFR BLD AUTO: 31.3 % (ref 37–47)
HGB BLD-MCNC: 10.1 G/DL (ref 12–16)
LACTATE SERPL-SCNC: 2.2 MMOL/L (ref 0.5–2)
LACTATE SERPL-SCNC: 2.2 MMOL/L (ref 0.5–2)
LV EJECT FRACT  99904: 60
LV EJECT FRACT MOD 2C 99903: 63.6
LV EJECT FRACT MOD 4C 99902: 61.32
LV EJECT FRACT MOD BP 99901: 61.73
LYMPHOCYTES # BLD AUTO: 0.63 K/UL (ref 1–4.8)
LYMPHOCYTES NFR BLD: 3.4 % (ref 22–41)
MAGNESIUM SERPL-MCNC: 1.6 MG/DL (ref 1.5–2.5)
MANUAL DIFF BLD: NORMAL
MCH RBC QN AUTO: 31.9 PG (ref 27–33)
MCHC RBC AUTO-ENTMCNC: 32.3 G/DL (ref 32.2–35.5)
MCV RBC AUTO: 98.7 FL (ref 81.4–97.8)
METAMYELOCYTES NFR BLD MANUAL: 0.9 %
MONOCYTES # BLD AUTO: 0.15 K/UL (ref 0–0.85)
MONOCYTES NFR BLD AUTO: 0.8 % (ref 0–13.4)
MORPHOLOGY BLD-IMP: NORMAL
NEUTROPHILS # BLD AUTO: 17.48 K/UL (ref 1.82–7.42)
NEUTROPHILS NFR BLD: 94 % (ref 44–72)
NRBC # BLD AUTO: 0 K/UL
NRBC BLD-RTO: 0 /100 WBC (ref 0–0.2)
PATHOLOGY CONSULT NOTE: NORMAL
PHOSPHATE SERPL-MCNC: 4 MG/DL (ref 2.5–4.5)
PLATELET # BLD AUTO: 249 K/UL (ref 164–446)
PLATELET BLD QL SMEAR: NORMAL
PMV BLD AUTO: 9.9 FL (ref 9–12.9)
POIKILOCYTOSIS BLD QL SMEAR: NORMAL
POTASSIUM SERPL-SCNC: 4.7 MMOL/L (ref 3.6–5.5)
PROMYELOCYTES NFR BLD MANUAL: 0.9 %
PROT SERPL-MCNC: 4.8 G/DL (ref 6–8.2)
RBC # BLD AUTO: 3.17 M/UL (ref 4.2–5.4)
RBC BLD AUTO: PRESENT
SODIUM SERPL-SCNC: 139 MMOL/L (ref 135–145)
WBC # BLD AUTO: 18.6 K/UL (ref 4.8–10.8)

## 2023-11-06 PROCEDURE — 71045 X-RAY EXAM CHEST 1 VIEW: CPT

## 2023-11-06 PROCEDURE — 700105 HCHG RX REV CODE 258: Performed by: SURGERY

## 2023-11-06 PROCEDURE — 700111 HCHG RX REV CODE 636 W/ 250 OVERRIDE (IP): Performed by: NURSE PRACTITIONER

## 2023-11-06 PROCEDURE — 84100 ASSAY OF PHOSPHORUS: CPT

## 2023-11-06 PROCEDURE — 700111 HCHG RX REV CODE 636 W/ 250 OVERRIDE (IP): Mod: JZ | Performed by: SURGERY

## 2023-11-06 PROCEDURE — 85027 COMPLETE CBC AUTOMATED: CPT

## 2023-11-06 PROCEDURE — 94003 VENT MGMT INPAT SUBQ DAY: CPT

## 2023-11-06 PROCEDURE — 83605 ASSAY OF LACTIC ACID: CPT | Mod: 91

## 2023-11-06 PROCEDURE — 770022 HCHG ROOM/CARE - ICU (200)

## 2023-11-06 PROCEDURE — 82962 GLUCOSE BLOOD TEST: CPT

## 2023-11-06 PROCEDURE — 700105 HCHG RX REV CODE 258: Performed by: NURSE PRACTITIONER

## 2023-11-06 PROCEDURE — 80053 COMPREHEN METABOLIC PANEL: CPT

## 2023-11-06 PROCEDURE — 82533 TOTAL CORTISOL: CPT

## 2023-11-06 PROCEDURE — 93306 TTE W/DOPPLER COMPLETE: CPT

## 2023-11-06 PROCEDURE — 700117 HCHG RX CONTRAST REV CODE 255: Performed by: INTERNAL MEDICINE

## 2023-11-06 PROCEDURE — 99024 POSTOP FOLLOW-UP VISIT: CPT | Performed by: SURGERY

## 2023-11-06 PROCEDURE — 87040 BLOOD CULTURE FOR BACTERIA: CPT | Mod: 91

## 2023-11-06 PROCEDURE — 94799 UNLISTED PULMONARY SVC/PX: CPT

## 2023-11-06 PROCEDURE — 83735 ASSAY OF MAGNESIUM: CPT

## 2023-11-06 PROCEDURE — 700105 HCHG RX REV CODE 258: Performed by: STUDENT IN AN ORGANIZED HEALTH CARE EDUCATION/TRAINING PROGRAM

## 2023-11-06 PROCEDURE — 700111 HCHG RX REV CODE 636 W/ 250 OVERRIDE (IP): Performed by: SURGERY

## 2023-11-06 PROCEDURE — 85007 BL SMEAR W/DIFF WBC COUNT: CPT

## 2023-11-06 PROCEDURE — 93306 TTE W/DOPPLER COMPLETE: CPT | Mod: 26 | Performed by: INTERNAL MEDICINE

## 2023-11-06 PROCEDURE — 99291 CRITICAL CARE FIRST HOUR: CPT | Performed by: SURGERY

## 2023-11-06 RX ORDER — SODIUM CHLORIDE, SODIUM LACTATE, POTASSIUM CHLORIDE, AND CALCIUM CHLORIDE .6; .31; .03; .02 G/100ML; G/100ML; G/100ML; G/100ML
1000 INJECTION, SOLUTION INTRAVENOUS ONCE
Status: COMPLETED | OUTPATIENT
Start: 2023-11-06 | End: 2023-11-06

## 2023-11-06 RX ORDER — SODIUM CHLORIDE, SODIUM LACTATE, POTASSIUM CHLORIDE, AND CALCIUM CHLORIDE .6; .31; .03; .02 G/100ML; G/100ML; G/100ML; G/100ML
250 INJECTION, SOLUTION INTRAVENOUS ONCE
Status: COMPLETED | OUTPATIENT
Start: 2023-11-06 | End: 2023-11-06

## 2023-11-06 RX ORDER — MAGNESIUM SULFATE 1 G/100ML
1 INJECTION INTRAVENOUS ONCE
Status: COMPLETED | OUTPATIENT
Start: 2023-11-06 | End: 2023-11-06

## 2023-11-06 RX ADMIN — FAMOTIDINE 20 MG: 10 INJECTION, SOLUTION INTRAVENOUS at 04:58

## 2023-11-06 RX ADMIN — HYDROMORPHONE HYDROCHLORIDE 0.5 MG: 1 INJECTION, SOLUTION INTRAMUSCULAR; INTRAVENOUS; SUBCUTANEOUS at 12:25

## 2023-11-06 RX ADMIN — SODIUM CHLORIDE, POTASSIUM CHLORIDE, SODIUM LACTATE AND CALCIUM CHLORIDE: 600; 310; 30; 20 INJECTION, SOLUTION INTRAVENOUS at 09:20

## 2023-11-06 RX ADMIN — FAMOTIDINE 20 MG: 10 INJECTION, SOLUTION INTRAVENOUS at 17:29

## 2023-11-06 RX ADMIN — PIPERACILLIN AND TAZOBACTAM 4.5 G: 4; .5 INJECTION, POWDER, FOR SOLUTION INTRAVENOUS at 04:52

## 2023-11-06 RX ADMIN — SODIUM CHLORIDE, POTASSIUM CHLORIDE, SODIUM LACTATE AND CALCIUM CHLORIDE: 600; 310; 30; 20 INJECTION, SOLUTION INTRAVENOUS at 17:41

## 2023-11-06 RX ADMIN — MAGNESIUM SULFATE HEPTAHYDRATE 1 G: 10 INJECTION, SOLUTION INTRAVENOUS at 10:42

## 2023-11-06 RX ADMIN — HYDROMORPHONE HYDROCHLORIDE 0.5 MG: 1 INJECTION, SOLUTION INTRAMUSCULAR; INTRAVENOUS; SUBCUTANEOUS at 02:14

## 2023-11-06 RX ADMIN — HYDROMORPHONE HYDROCHLORIDE 0.5 MG: 1 INJECTION, SOLUTION INTRAMUSCULAR; INTRAVENOUS; SUBCUTANEOUS at 17:29

## 2023-11-06 RX ADMIN — PROPOFOL 5 MCG/KG/MIN: 10 INJECTION, EMULSION INTRAVENOUS at 09:21

## 2023-11-06 RX ADMIN — PIPERACILLIN AND TAZOBACTAM 4.5 G: 4; .5 INJECTION, POWDER, FOR SOLUTION INTRAVENOUS at 22:41

## 2023-11-06 RX ADMIN — SODIUM CHLORIDE, POTASSIUM CHLORIDE, SODIUM LACTATE AND CALCIUM CHLORIDE 250 ML: 600; 310; 30; 20 INJECTION, SOLUTION INTRAVENOUS at 17:35

## 2023-11-06 RX ADMIN — PIPERACILLIN AND TAZOBACTAM 4.5 G: 4; .5 INJECTION, POWDER, FOR SOLUTION INTRAVENOUS at 16:07

## 2023-11-06 RX ADMIN — HUMAN ALBUMIN MICROSPHERES AND PERFLUTREN 3 ML: 10; .22 INJECTION, SOLUTION INTRAVENOUS at 20:30

## 2023-11-06 RX ADMIN — SODIUM CHLORIDE, POTASSIUM CHLORIDE, SODIUM LACTATE AND CALCIUM CHLORIDE 1000 ML: 600; 310; 30; 20 INJECTION, SOLUTION INTRAVENOUS at 02:09

## 2023-11-06 ASSESSMENT — FIBROSIS 4 INDEX: FIB4 SCORE: 2.67

## 2023-11-06 NOTE — ASSESSMENT & PLAN NOTE
11/5 Emergent Exploratory laparotomy with bowel resection.   Left in discontinuity / ABThera  11/7 Additional small bowel resected at ileum, and proximal anastomosis, fascial closure.    11/10 Zosyn day 4 of 4 from source control and closure.    11/13 CT abdomen pelvis completed for leukocytosis without evidence of intraabdominal abscess

## 2023-11-06 NOTE — ASSESSMENT & PLAN NOTE
Chronic condition treated with levothyroxine.  Holding maintenance medication during acute septic illness.  11/10 Resumed levothyroxine.

## 2023-11-06 NOTE — ANESTHESIA PROCEDURE NOTES
Central Venous Line    Performed by: Leonel Mena III, M.D.  Authorized by: Leonel Mena III, M.D.    Start Time:  11/5/2023 4:54 PM  End Time:  11/5/2023 5:03 PM  Patient Location:  OR  Indication: central venous access        provider hand hygiene performed prior to central venous catheter insertion, all 5 sterile barriers used (gloves, gown, cap, mask, large sterile drape) during central venous catheter insertion and skin prep agent completely dried prior to procedure    Patient Position:  Trendelenburg  Laterality:  Right  Site:  Internal jugular  Prep:  Chlorhexidine  Catheter Size:  8 Fr  Catheter Length (cm):  16  Number of Lumens:  Double lumen  target vein identified, needle advanced into vein and blood aspirated and guidewire advanced into vein    Seldinger Technique?: Yes    Ultrasound-Guided: ultrasound-guided  Image captured, interpreted and electronically stored.  Sterile Gel and Probe Cover Used for Ultrasound?: Yes    Intravenous Verification: verified by ultrasound, venous blood return and chest x-ray pending    all ports aspirated, all ports flushed easily, guidewire was removed intact, biopatch was applied, line was sutured in place and dressing was applied    Events: patient tolerated procedure well with no complications    PA Catheter Placed?: No

## 2023-11-06 NOTE — PROGRESS NOTES
Reviewed all current available data.  Lactate slowly improving, UOP was marginal during surgery but improving now, Recent ABG with base deficit of -6, and ongoing but improving pressor requirement.  Will bolus an additional 500cc LR at this time.  TEG shows appropriate clotting response, no hemostatic resuscitation indicated.      Parmjit Wilder MD

## 2023-11-06 NOTE — CARE PLAN
Problem: Ventilation  Goal: Ability to achieve and maintain unassisted ventilation or tolerate decreased levels of ventilator support  Description: Target End Date:  4 days     Document on Vent flowsheet    1.  Support and monitor invasive and noninvasive mechanical ventilation  2.  Monitor ventilator weaning response  3.  Perform ventilator associated pneumonia prevention interventions  4.  Manage ventilation therapy by monitoring diagnostic test results  Outcome: Not Progressing     Ventilator Daily Summary    Vent Day #2    Airway: 7.5/22    Ventilator settings: asv 110/8/30    Weaning trials:failed    Respiratory Procedures:no    Plan: Continue current ventilator settings and wean mechanical ventilation as tolerated per physician orders.

## 2023-11-06 NOTE — PROGRESS NOTES
Trauma / Surgical Daily Progress Note    Date of Service  11/6/2023    Chief Complaint  78 y.o. female admitted 11/5/2023 with Small bowel obstruction    Interval Events  Open abdomen  Off pressors .  2 liters crystalloid bolus overnight  ASV:  sbt resulted in apnea  UO:  recently 50 per hour  Zosyn: day 2:  abdomin   NG npo  Begin Lovenox        Review of Systems  Review of Systems     Vital Signs for last 24 hours  Pulse:  [] 104  Resp:  [15-37] 21  BP: ()/(53-71) 128/61  SpO2:  [98 %-100 %] 99 %    Hemodynamic parameters for last 24 hours       Respiratory Data     Respiration: (!) 21, Pulse Oximetry: 99 %     Work Of Breathing / Effort: Vented  RUL Breath Sounds: Clear, RML Breath Sounds: Clear, RLL Breath Sounds: Diminished, ESTHER Breath Sounds: Clear, LLL Breath Sounds: Diminished    Physical Exam  Physical Exam  Eyes:      General: No scleral icterus.  Cardiovascular:      Rate and Rhythm: Regular rhythm.   Abdominal:      Palpations: Abdomen is soft.   Skin:     General: Skin is warm and dry.   Neurological:      General: No focal deficit present.         Laboratory  Recent Results (from the past 24 hour(s))   Histology Request    Collection Time: 11/05/23  5:25 PM   Result Value Ref Range    Pathology Request Sent to Histo    LACTIC ACID    Collection Time: 11/05/23  8:05 PM   Result Value Ref Range    Lactic Acid 2.3 (H) 0.5 - 2.0 mmol/L   PLATELET MAPPING WITH BASIC TEG    Collection Time: 11/05/23  8:05 PM   Result Value Ref Range    Reaction Time Initial-R 3.8 (L) 4.6 - 9.1 min    React Time Initial Hep 3.5 (L) 4.3 - 8.3 min    Clot Kinetics-K 0.9 0.8 - 2.1 min    Clot Angle-Angle 77.5 63.0 - 78.0 degrees    Maximum Clot Strength-MA 65.9 52.0 - 69.0 mm    TEG Functional Fibrinogen(MA) 27.7 15.0 - 32.0 mm    Lysis 30 minutes-LY30 0.1 0.0 - 2.6 %    % Inhibition ADP 68.3 (H) 0.0 - 17.0 %    % Inhibition AA 73.1 (H) 0.0 - 11.0 %    TEG Algorithm Link Algorithm    POCT arterial blood gas device  results    Collection Time: 11/05/23  8:24 PM   Result Value Ref Range    Ph 7.373 (L) 7.400 - 7.500    Pco2 32.4 26.0 - 37.0 mmHg    Po2 107 (H) 64 - 87 mmHg    Tco2 20 20 - 33 mmol/L    S02 98 93 - 99 %    Hco3 18.8 17.0 - 25.0 mmol/L    BE -6 (L) -4 - 3 mmol/L    Body Temp 97.7 F degrees    O2 Therapy 50 %    iPF Ratio 214     Ph Temp Sherri 7.380 (L) 7.400 - 7.500    Pco2 Temp Co 31.7 26.0 - 37.0 mmHg    Po2 Temp Cor 104 (H) 64 - 87 mmHg    Specimen Arterial     DelSys Vent     End Tidal Carbon Dioxide 30 mmhg    Tidal Volume 330 mL    Peep End Expiratory Pressure 8 cmh20    Set Rate 20     Mode APV-CMV    POCT glucose device results    Collection Time: 11/05/23  8:28 PM   Result Value Ref Range    POC Glucose, Blood 108 (H) 65 - 99 mg/dL   URINALYSIS    Collection Time: 11/05/23 11:10 PM    Specimen: Urine, Arshad Cath   Result Value Ref Range    Color Yellow     Character Clear     Specific Gravity >=1.030 <1.035    Ph 5.5 5.0 - 8.0    Glucose Negative Negative mg/dL    Ketones Trace (A) Negative mg/dL    Protein 30 (A) Negative mg/dL    Bilirubin Negative Negative    Urobilinogen, Urine 0.2 Negative    Nitrite Negative Negative    Leukocyte Esterase Negative Negative    Occult Blood Large (A) Negative    Micro Urine Req Microscopic    URINE MICROSCOPIC (W/UA)    Collection Time: 11/05/23 11:10 PM   Result Value Ref Range    WBC 0-2 /hpf    RBC 5-10 (A) /hpf    Bacteria Few (A) None /hpf    Epithelial Cells Negative /hpf    Epithelial Cells Renal Rare /hpf    Trans Epithelial Cells Rare /hpf    Hyaline Cast 0-2 /lpf   POCT glucose device results    Collection Time: 11/05/23 11:12 PM   Result Value Ref Range    POC Glucose, Blood 117 (H) 65 - 99 mg/dL   LACTIC ACID    Collection Time: 11/06/23 12:15 AM   Result Value Ref Range    Lactic Acid 2.2 (H) 0.5 - 2.0 mmol/L   Blood Culture    Collection Time: 11/06/23  4:45 AM    Specimen: Blood   Result Value Ref Range    Significant Indicator NEG     Source BLD     Site  Peripheral     Culture Result -    CBC with Differential: Tomorrow AM    Collection Time: 11/06/23  4:50 AM   Result Value Ref Range    WBC 18.6 (H) 4.8 - 10.8 K/uL    RBC 3.17 (L) 4.20 - 5.40 M/uL    Hemoglobin 10.1 (L) 12.0 - 16.0 g/dL    Hematocrit 31.3 (L) 37.0 - 47.0 %    MCV 98.7 (H) 81.4 - 97.8 fL    MCH 31.9 27.0 - 33.0 pg    MCHC 32.3 32.2 - 35.5 g/dL    RDW 50.9 (H) 35.9 - 50.0 fL    Platelet Count 249 164 - 446 K/uL    MPV 9.9 9.0 - 12.9 fL    Neutrophils-Polys 94.00 (H) 44.00 - 72.00 %    Lymphocytes 3.40 (L) 22.00 - 41.00 %    Monocytes 0.80 0.00 - 13.40 %    Eosinophils 0.00 0.00 - 6.90 %    Basophils 0.00 0.00 - 1.80 %    Nucleated RBC 0.00 0.00 - 0.20 /100 WBC    Neutrophils (Absolute) 17.48 (H) 1.82 - 7.42 K/uL    Lymphs (Absolute) 0.63 (L) 1.00 - 4.80 K/uL    Monos (Absolute) 0.15 0.00 - 0.85 K/uL    Eos (Absolute) 0.00 0.00 - 0.51 K/uL    Baso (Absolute) 0.00 0.00 - 0.12 K/uL    NRBC (Absolute) 0.00 K/uL   Comp Metabolic Panel (CMP): Tomorrow AM    Collection Time: 11/06/23  4:50 AM   Result Value Ref Range    Sodium 139 135 - 145 mmol/L    Potassium 4.7 3.6 - 5.5 mmol/L    Chloride 105 96 - 112 mmol/L    Co2 21 20 - 33 mmol/L    Anion Gap 13.0 7.0 - 16.0    Glucose 121 (H) 65 - 99 mg/dL    Bun 37 (H) 8 - 22 mg/dL    Creatinine 1.66 (H) 0.50 - 1.40 mg/dL    Calcium 8.5 8.5 - 10.5 mg/dL    Correct Calcium 9.7 8.5 - 10.5 mg/dL    AST(SGOT) 29 12 - 45 U/L    ALT(SGPT) 16 2 - 50 U/L    Alkaline Phosphatase 45 30 - 99 U/L    Total Bilirubin 0.4 0.1 - 1.5 mg/dL    Albumin 2.5 (L) 3.2 - 4.9 g/dL    Total Protein 4.8 (L) 6.0 - 8.2 g/dL    Globulin 2.3 1.9 - 3.5 g/dL    A-G Ratio 1.1 g/dL   Magnesium: Every Monday and Thursday AM    Collection Time: 11/06/23  4:50 AM   Result Value Ref Range    Magnesium 1.6 1.5 - 2.5 mg/dL   Phosphorus: Every Monday and Thursday AM    Collection Time: 11/06/23  4:50 AM   Result Value Ref Range    Phosphorus 4.0 2.5 - 4.5 mg/dL   CORTISOL    Collection Time: 11/06/23  4:50  AM   Result Value Ref Range    Cortisol 20.2 0.0 - 23.0 ug/dL   ESTIMATED GFR    Collection Time: 11/06/23  4:50 AM   Result Value Ref Range    GFR (CKD-EPI) 31 (A) >60 mL/min/1.73 m 2   PLATELET ESTIMATE    Collection Time: 11/06/23  4:50 AM   Result Value Ref Range    Plt Estimation Normal    MORPHOLOGY    Collection Time: 11/06/23  4:50 AM   Result Value Ref Range    RBC Morphology Present     Poikilocytosis 1+     Echinocytes 1+    PERIPHERAL SMEAR REVIEW    Collection Time: 11/06/23  4:50 AM   Result Value Ref Range    Peripheral Smear Review see below    DIFFERENTIAL MANUAL    Collection Time: 11/06/23  4:50 AM   Result Value Ref Range    Metamyelocytes 0.90 %    Progranulocytes 0.90 %    Manual Diff Status PERFORMED    Blood Culture    Collection Time: 11/06/23  4:55 AM    Specimen: Blood   Result Value Ref Range    Significant Indicator NEG     Source BLD     Site Peripheral     Culture Result -    LACTIC ACID    Collection Time: 11/06/23  5:30 AM   Result Value Ref Range    Lactic Acid 2.2 (H) 0.5 - 2.0 mmol/L   POCT glucose device results    Collection Time: 11/06/23 12:26 PM   Result Value Ref Range    POC Glucose, Blood 90 65 - 99 mg/dL       Fluids    Intake/Output Summary (Last 24 hours) at 11/6/2023 1702  Last data filed at 11/6/2023 1600  Gross per 24 hour   Intake 7822.38 ml   Output 1555 ml   Net 6267.38 ml       Core Measures & Quality Metrics  Labs reviewed, Medications reviewed and Radiology images reviewed  Arshad catheter: Critically Ill - Requiring Accurate Measurement of Urinary Output                  RAP Score Total: 0    ETOH Screening    Assessment/Plan  * Small bowel obstruction (HCC)- (present on admission)  Assessment & Plan  11/5 Emergent Exploratory laparotomy with bowel resection.  Zosyn initiated  -Left in discontinuity   -ABtherra in place.  11/6 Zosyn day 2     Respiratory failure following trauma and surgery (HCC)- (present on admission)  Assessment & Plan  Left intubated post  op  Continue full mechanical ventilatory support. Ventilator bundle and Trauma weaning protocol.    Sepsis (HCC)- (present on admission)  Assessment & Plan  This is Septic shock Present on admission  SIRS criteria identified on my evaluation include: Tachycardia, with heart rate greater than 90 BPM and Tachypnea, with respirations greater than 20 per minute  Clinical indicators of end organ dysfunction include Hypotension with systolic blood pressure less than 90 or MAP less than 65   Sources is: necrotic bowel  Sepsis protocol initiated  Crystalloid Fluid Administration: Fluid resuscitation ordered per standard protocol - 30 mL/kg per current or ideal body weight  IV antibiotics as appropriate for source of sepsis  Reassessment: I have reassessed the patient's hemodynamic status  IV pressor support initiated in OR    Elevated serum creatinine- (present on admission)  Assessment & Plan  IV hydration and trend.    HLD (hyperlipidemia)- (present on admission)  Assessment & Plan  Chronic condition treated with Zocor.  Holding maintenance medication during acute septic illness.    Bilateral leg edema- (present on admission)  Assessment & Plan  Chronic condition     Hypothyroid- (present on admission)  Assessment & Plan  Chronic condition treated with Synthroid.  Holding maintenance medication during acute septic illness.        Discussed patient condition with RN, RT, Pharmacy, and Dietary.  CRITICAL CARE TIME EXCLUDING PROCEDURES:  35  minutes.Decision making of high complexity.  I reviewed clinical labs, trends and orders for follow up.  Review of imaging,reports, consultant documentation .  Utilization of the information in todays decision making.   I evaluated the patient condition at bedside and discussed the daily plan(s) with available nursing staff,  pharmacists on rounds.

## 2023-11-06 NOTE — PROGRESS NOTES
Dr Locke at bedside. Updated son Jeffrey on POC and anticipated next steps as well as plan for hospital course. All questions answered by MD.

## 2023-11-06 NOTE — PROGRESS NOTES
"  DATE: 11/6/2023    Post Operative Day 1 Exploratory laparotomy, segmental small bowel resection for internal hernia with necrotic bowel, and damage control closure.    INTERVAL EVENTS:  Crystalloid resuscitation.  Low-grade vasopressor requirement.    PHYSICAL EXAMINATION:  Vital Signs: /56   Pulse 78   Temp 37.2 °C (98.9 °F) (Temporal)   Resp 20   Ht 1.626 m (5' 4\")   Wt 66.7 kg (147 lb 0.8 oz)   SpO2 99%     Wakes to voice, moves all extremities.  Regular rate and rhythm.  Extremities are well-perfused.  Mechanically ventilated with minimal ventilatory setting.  Moderate abdominal tenderness with functioning ABThera dressing.    LABORATORY VALUES:   Recent Labs     11/05/23  1432 11/06/23  0450   WBC 18.2* 18.6*   RBC 3.69* 3.17*   HEMOGLOBIN 11.5* 10.1*   HEMATOCRIT 36.5* 31.3*   MCV 98.9* 98.7*   MCH 31.2 31.9   MCHC 31.5* 32.3   RDW 50.2* 50.9*   PLATELETCT 240 249   MPV 10.2 9.9     Recent Labs     11/05/23  1432 11/06/23  0450   SODIUM 140 139   POTASSIUM 5.1 4.7   CHLORIDE 104 105   CO2 21 21   GLUCOSE 101* 121*   BUN 36* 37*   CREATININE 2.13* 1.66*   CALCIUM 8.9 8.5     IMAGING:   Morning chest radiograph demonstrates clear lung fields with appropriate positioning of lines tubes and drains.    ASSESSMENT AND PLAN:  Satisfactory progress following emergent bowel resection.  Recommend continued postoperative targeted resuscitation.  Tentatively plan second look laparotomy with small bowel anastomosis and delayed primary fascial closure tomorrow.    Appreciate ICU and consulting physician care.       ____________________________________     Tobi Locke M.D.    DD: 11/6/2023  9:16 AM    "

## 2023-11-06 NOTE — ANESTHESIA PROCEDURE NOTES
Arterial Line    Performed by: Leonel Mena III, M.D.  Authorized by: Leonel Mena III, M.D.    Start Time:  11/5/2023 4:53 PM  End Time:  11/5/2023 4:54 PM  Localization: surface landmarks    Patient Location:  OR  Indication: continuous blood pressure monitoring        Catheter Size:  20 G  Seldinger Technique?: Yes    Laterality:  Left  Site:  Radial artery  Line Secured:  Antimicrobial disc, tape and transparent dressing  Events: patient tolerated procedure well with no complications

## 2023-11-06 NOTE — ASSESSMENT & PLAN NOTE
Septic shock Present on admission  SIRS criteria identified on my evaluation include: Tachycardia, with heart rate greater than 90 BPM and Tachypnea, with respirations greater than 20 per minute  Clinical indicators of end organ dysfunction include Hypotension with systolic blood pressure less than 90 or MAP less than 65   Sources : necrotic bowel  Sepsis protocol initiated  Crystalloid Fluid Administration: Fluid resuscitation ordered per standard protocol - 30 mL/kg per current or ideal body weight  IV antibiotics as appropriate for source of sepsis  End points assessed.   IV pressor support initiated in OR

## 2023-11-06 NOTE — OP REPORT
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    Anesthesiologist/Type of Anesthesia:  Anesthesiologist: Leonel Mena III, M.D./General    Surgical Staff:  Circulator: Enid Velasco R.N.; Oriana Bowling R.N.  Scrub Person: Randa Figueredo; Lopez Cha; Katie Shen  First Assist: Katie Hendrickson A.PPegN.    Specimens removed if any:  * No specimens in log *    Estimated Blood Loss: Less than 30 cc    Findings: Adhesive bands acting as a lead point for midgut volvulus with approximately 65 cm of necrotic small bowel.  Dense intra-abdominal adhesions.  Hemodynamic instability.    Complications: None noted    Indication: 78-year-old female transferred emergently from [facility] after she had been observed for 24 hours for small bowel obstruction and had significant clinical decompensation with oliguria, worsening leukocytosis, worsening abdominal examination and mental status.  After consultation, will take patient was taken emergently to the operating room for exploration.    Operative report: Patient was taken to the operating room placed in supine position on the operating table.  She is placed under general anesthesia and intubated by the anesthesiologist.  Arshad catheter was already present.  Arterial line and central line were placed by anesthesia.  Meanwhile we prepped and draped the abdomen in standard fashion.  Incision was made starting just above the umbilicus to the mid hypogastrium following her old surgical scar.  The abdominal wall was dissected through using Bovie cautery until the peritoneum was entered.  We then carefully opened the abdomen along the length of our incision using the Bovie cautery and taking  down some adhesions to the anterior abdominal wall using blunt dissection.  We immediately noted a mass of necrotic appearing bowel, foul odor and hemorrhagic ascites.  The ascites was evacuated and we delivered the bowel out of the abdomen.  Was clearly torsed around a lead point that appeared to be an adhesive band.  We untwisted the bowel partially and then were able to get a right angle clamp around the band which was then lysed.  This allowed us to do further unwind the bowel and a second band was identified.  This was also encircled with a right clamp and lysed with the Metzenbaum scissors.  Once this was done we are able to lay out the area of necrotic bowel.  Portion appeared to dive under another loop of small bowel which was adherent down towards the pelvis.  We are unable to mobilize it any further so decision was made to mobilize that loop.  Once we got down to that area it was clear that the loop of bowel was densely adherent to the right iliac vessel just below the bifurcation.  We carefully mobilized the bowel off of the vessels using Metzenbaum scissors.  The vessels appear to be untouched but there was a small serosal defect in the small bowel that was repaired using 3-0 Vicryl in a Lembert fashion.  Were then able to mobilize sufficient length the necrotic limb of small bowel to perform resection.  At this point anesthesiology stated that the patient was sufficient vasopressors that we should try to terminate the operation as quickly as possible.  We divided the proximal and distal the extremes of the necrotic bowel loop with the EPIFANIO stapler.  The mesentery was taken using the LigaSure.  We then passed off the specimen.  The abdomen was then quickly irrigated and we placed an ABThera dressing.  Sponges were placed and then the abdominal wall was cleaned and dried.  The outer dressing over the ABThera was placed and suction was initiated.  The patient was then taken to the surgical intensive care  unit in critical condition on the ventilator.  The sponge, needle instrument counts were correct at the end of the case.    The complexity of the surgical procedure necessitated additional skilled operative assistance from another surgeon. The assistant was present for a substantial portion of the operation and provided assistance to the operating physician throughout the critical aspects of the procedure. The surgical assistant performed the following: provided assistance with optimal surgical exposure of the operative field, provided high complexity, subspecialty decision making input, assisted with the surgical resection, and lysis of adhesions and VAC placement.         ____________________________________     Doroteo York D.O.    DD: 11/5/2023  6:01 PM      11/5/2023 5:50 PM Doroteo York D.O.

## 2023-11-06 NOTE — ANESTHESIA PROCEDURE NOTES
Peripheral IV    Date/Time: 11/5/2023 4:46 PM    Performed by: Leonel Mena III, M.D.  Authorized by: Leonel Mena III, M.D.    Size:  16 G (short Jelco)  Laterality:  Right  Peripheral IV Location:  Hand  Local Anesthetic:  None  Site Prep:  Chlorhexidine  Technique:  Direct puncture  Attempts:  1

## 2023-11-06 NOTE — PROGRESS NOTES
Pt arrived to [facility]. Call light in place. All needs met at this time. Admitting provider notified of arrival.

## 2023-11-06 NOTE — ANESTHESIA PROCEDURE NOTES
Airway    Date/Time: 11/5/2023 4:35 PM    Performed by: Leonel Mena III, M.D.  Authorized by: Leonel Mena III, M.D.    Location:  OR  Urgency:  Elective  Difficult Airway: No    Indications for Airway Management:  Anesthesia      Spontaneous Ventilation: absent    Sedation Level:  Deep  Preoxygenated: Yes    Patient Position:  Sniffing  Final Airway Type:  Endotracheal airway  Final Endotracheal Airway:  ETT  Cuffed: Yes    Technique Used for Successful ETT Placement:  Direct laryngoscopy    Insertion Site:  Oral  Blade Type:  Faye  Laryngoscope Blade/Videolaryngoscope Blade Size:  2  ETT Size (mm):  7.5  Measured from:  Lips  ETT to Lips (cm):  21  Placement Verified by: auscultation and capnometry    Cormack-Lehane Classification:  Grade IV - neither glottis nor epiglottis seen  Number of Attempts at Approach:  1

## 2023-11-06 NOTE — ANESTHESIA TIME REPORT
Anesthesia Start and Stop Event Times     Date Time Event    11/5/2023 1617 Ready for Procedure     1627 Anesthesia Start     1752 Anesthesia Stop        Responsible Staff  11/05/23    Name Role Begin End    Leonel Mena III, M.D. Anesth 1627 1754        Overtime Reason:  no overtime (within assigned shift)    Comments:

## 2023-11-06 NOTE — PROGRESS NOTES
Trauma / Surgical Daily Progress Note    Date of Service  11/5/2023    Chief Complaint  78 y.o. female admitted 11/5/2023 with Small bowel obstruction    Interval Events  To OR for ex lap, internal hernia with >60cm dead bowel  Resected, in discontinuity, abthera in place  Hemodynamically stable prior to inuction, now requiring low dose nor-epi    Review of Systems  Review of Systems   Unable to perform ROS: Intubated        Vital Signs for last 24 hours  Temp:  [36.7 °C (98.1 °F)-37.4 °C (99.3 °F)] 37.2 °C (98.9 °F)  Pulse:  [] 96  Resp:  [18-20] 18  BP: (107-121)/(47-56) 121/56  SpO2:  [98 %-100 %] 100 %    Hemodynamic parameters for last 24 hours       Respiratory Data     Respiration: 18, Pulse Oximetry: 100 %             Physical Exam  Physical Exam  Constitutional:       Comments: Sedated, intubated   HENT:      Head: Normocephalic and atraumatic.      Right Ear: External ear normal.      Left Ear: External ear normal.      Mouth/Throat:      Mouth: Mucous membranes are dry.      Pharynx: Oropharynx is clear.   Eyes:      General: No scleral icterus.     Pupils: Pupils are equal, round, and reactive to light.   Cardiovascular:      Rate and Rhythm: Regular rhythm. Tachycardia present.   Pulmonary:      Effort: Pulmonary effort is normal.      Breath sounds: Normal breath sounds.   Abdominal:      Comments: Abthera in place, abdomen flat.  Cannister with thin serous appearing fluid   Musculoskeletal:         General: No swelling.   Skin:     General: Skin is warm.      Capillary Refill: Capillary refill takes 2 to 3 seconds.      Coloration: Skin is pale.   Neurological:      Comments: sedated         Laboratory  Recent Results (from the past 24 hour(s))   POCT glucose device results    Collection Time: 11/05/23  1:07 PM   Result Value Ref Range    POC Glucose, Blood 98 65 - 99 mg/dL   EKG    Collection Time: 11/05/23  2:00 PM   Result Value Ref Range    Report       [facility]    Test Date:   2023  Pt Name:    MIGUEL HENDERSON                Department: 141  MRN:        [id number]                      Room:       T436  Gender:     Female                       Technician: [facility]  :        [id number]                   Requested By:HANSA OBRIEN  Order #:    [id number]                    Reading MD:    Measurements  Intervals                                Axis  Rate:       102                          P:          61  KY:         122                          QRS:        33  QRSD:       78                           T:          60  QT:         334  QTc:        436    Interpretive Statements  SINUS TACHYCARDIA  Compared to ECG 2019 12:16:09  Sinus rhythm no longer present  T-wave abnormality no longer present     ABG - LAB    Collection Time: 23  2:21 PM   Result Value Ref Range    Ph 7.39 (L) 7.40 - 7.50    Pco2 38.6 (H) 26.0 - 37.0 mmHg    Po2 86.1 64.0 - 87.0 mmHg    O2 Saturation 95.8 93.0 - 99.0 %    Hco3 23 17 - 25 mmol/L    Base Excess -2 -4 - 3 mmol/L    Body Temp 36.4 Centigrade    O2 Therapy 4     O2 Therapy 4.0 2.0 - 10.0 L/min    Ph -TC 7.40 7.40 - 7.50    Pco2 -TC 37.6 (H) 26.0 - 37.0 mmHg    Po2 -TC 82.9 64.0 - 87.0 mmHg   LACTIC ACID    Collection Time: 23  2:29 PM   Result Value Ref Range    Lactic Acid 3.9 (H) 0.5 - 2.0 mmol/L   TSH WITH REFLEX TO FT4    Collection Time: 23  2:29 PM   Result Value Ref Range    TSH 4.590 0.380 - 5.330 uIU/mL   Comp Metabolic Panel    Collection Time: 23  2:32 PM   Result Value Ref Range    Sodium 140 135 - 145 mmol/L    Potassium 5.1 3.6 - 5.5 mmol/L    Chloride 104 96 - 112 mmol/L    Co2 21 20 - 33 mmol/L    Anion Gap 15.0 7.0 - 16.0    Glucose 101 (H) 65 - 99 mg/dL    Bun 36 (H) 8 - 22 mg/dL    Creatinine 2.13 (H) 0.50 - 1.40 mg/dL    Calcium 8.9 8.5 - 10.5 mg/dL    Correct Calcium 9.5 8.5 - 10.5 mg/dL    AST(SGOT) 34 12 - 45 U/L    ALT(SGPT) 18 2 - 50 U/L    Alkaline Phosphatase 51 30 - 99 U/L    Total Bilirubin 0.4 0.1 - 1.5 mg/dL     Albumin 3.2 3.2 - 4.9 g/dL    Total Protein 6.0 6.0 - 8.2 g/dL    Globulin 2.8 1.9 - 3.5 g/dL    A-G Ratio 1.1 g/dL   CBC WITH DIFFERENTIAL    Collection Time: 11/05/23  2:32 PM   Result Value Ref Range    WBC 18.2 (H) 4.8 - 10.8 K/uL    RBC 3.69 (L) 4.20 - 5.40 M/uL    Hemoglobin 11.5 (L) 12.0 - 16.0 g/dL    Hematocrit 36.5 (L) 37.0 - 47.0 %    MCV 98.9 (H) 81.4 - 97.8 fL    MCH 31.2 27.0 - 33.0 pg    MCHC 31.5 (L) 32.2 - 35.5 g/dL    RDW 50.2 (H) 35.9 - 50.0 fL    Platelet Count 240 164 - 446 K/uL    MPV 10.2 9.0 - 12.9 fL    Neutrophils-Polys 82.90 (H) 44.00 - 72.00 %    Lymphocytes 5.10 (L) 22.00 - 41.00 %    Monocytes 3.40 0.00 - 13.40 %    Eosinophils 0.00 0.00 - 6.90 %    Basophils 0.00 0.00 - 1.80 %    Nucleated RBC 0.00 0.00 - 0.20 /100 WBC    Neutrophils (Absolute) 16.65 (H) 1.82 - 7.42 K/uL    Lymphs (Absolute) 0.93 (L) 1.00 - 4.80 K/uL    Monos (Absolute) 0.62 0.00 - 0.85 K/uL    Eos (Absolute) 0.00 0.00 - 0.51 K/uL    Baso (Absolute) 0.00 0.00 - 0.12 K/uL    NRBC (Absolute) 0.00 K/uL   AMMONIA    Collection Time: 11/05/23  2:32 PM   Result Value Ref Range    Ammonia 19 11 - 45 umol/L   proBrain Natriuretic Peptide, NT    Collection Time: 11/05/23  2:32 PM   Result Value Ref Range    NT-proBNP 7276 (H) 0 - 125 pg/mL   ESTIMATED GFR    Collection Time: 11/05/23  2:32 PM   Result Value Ref Range    GFR (CKD-EPI) 23 (A) >60 mL/min/1.73 m 2   DIFFERENTIAL MANUAL    Collection Time: 11/05/23  2:32 PM   Result Value Ref Range    Bands-Stabs 8.60 0.00 - 10.00 %    Manual Diff Status PERFORMED    PERIPHERAL SMEAR REVIEW    Collection Time: 11/05/23  2:32 PM   Result Value Ref Range    Peripheral Smear Review see below    PLATELET ESTIMATE    Collection Time: 11/05/23  2:32 PM   Result Value Ref Range    Plt Estimation Normal    MORPHOLOGY    Collection Time: 11/05/23  2:32 PM   Result Value Ref Range    RBC Morphology Present     Poikilocytosis 1+     Ovalocytes 1+    COD - Adult (Type and Screen)     Collection Time: 11/05/23  4:10 PM   Result Value Ref Range    ABO Grouping Only O     Rh Grouping Only NEG     Antibody Screen-Cod NEG    ABO Rh Confirm    Collection Time: 11/05/23  4:24 PM   Result Value Ref Range    ABO Rh Confirm O NEG        Fluids    Intake/Output Summary (Last 24 hours) at 11/5/2023 1809  Last data filed at 11/5/2023 1612  Gross per 24 hour   Intake --   Output 700 ml   Net -700 ml       Core Measures & Quality Metrics  EKG reviewed, Labs reviewed, Medications reviewed and Radiology images reviewed  Arshad catheter: Critically Ill - Requiring Accurate Measurement of Urinary Output  Central line in place: Shock and Need for access    DVT Prophylaxis: Contraindicated - High bleeding risk  DVT prophylaxis - mechanical: SCDs  Ulcer prophylaxis: No  Antibiotics: Treating active infection/contamination beyond 24 hours perioperative coverage      RAP Score Total: 0    ETOH Screening    Assessment/Plan  * Small bowel obstruction (HCC)- (present on admission)  Assessment & Plan  11/5 Emergent Exploratory laparotomy with bowel resection.  -Left in discontinuity   -ABtherra in place.     Respiratory failure following trauma and surgery (HCC)- (present on admission)  Assessment & Plan  Left intubated post op  Continue full mechanical ventilatory support. Ventilator bundle and Trauma weaning protocol.    Sepsis (HCC)- (present on admission)  Assessment & Plan  This is Septic shock Present on admission  SIRS criteria identified on my evaluation include: Tachycardia, with heart rate greater than 90 BPM and Tachypnea, with respirations greater than 20 per minute  Clinical indicators of end organ dysfunction include Hypotension with systolic blood pressure less than 90 or MAP less than 65   Sources is: necrotic bowel  Sepsis protocol initiated  Crystalloid Fluid Administration: Fluid resuscitation ordered per standard protocol - 30 mL/kg per current or ideal body weight  IV antibiotics as appropriate for  source of sepsis  Reassessment: I have reassessed the patient's hemodynamic status  IV pressor support initiated in OR    Elevated serum creatinine- (present on admission)  Assessment & Plan  IV hydration and trend.    HLD (hyperlipidemia)- (present on admission)  Assessment & Plan  Chronic condition treated with Zocor.  Holding maintenance medication during acute septic illness.    Bilateral leg edema- (present on admission)  Assessment & Plan  Chronic condition     Hypothyroid- (present on admission)  Assessment & Plan  Chronic condition treated with Synthroid.  Holding maintenance medication during acute septic illness.        Discussed patient condition with Family, RN, RT, and Pharmacy.

## 2023-11-06 NOTE — PROGRESS NOTES
Pt arrive to T909 at 1756    Vitals-  /59  HR 85  Temp 97.6f    RR 25  SpO2 100    Height 162.6cm  Weight 65.4kg      4 Eyes Skin Assessment Completed by JESUS Bradford and JESUS Platt.    Head WDL  Ears WDL  Nose WDL  Mouth WDL  Neck WDL  Breast/Chest WDL  Shoulder Blades WDL  Spine WDL  (R) Arm/Elbow/Hand bruising forearm  (L) Arm/Elbow/Hand WDL  Abdomen surgical incision with abthera wound vac in place  Groin WDL  Scrotum/Coccyx/Buttocks Redness and Non-Blanching  (R) Leg WDL  (L) Leg WDL  (R) Heel/Foot/Toe WDL  (L) Heel/Foot/Toe WDL      Devices In Places Blood Pressure Cuff, Pulse Ox, Arshad, Arterial Line, SCD's, and ET Tube      Interventions In Place Sacral Mepilex, TAP System, Pillows, Q2 Turns, Low Air Loss Mattress, and Heels Loaded W/Pillows    Possible Skin Injury Yes    Pictures Uploaded Into Epic Yes  Wound Consult Placed N/A  RN Wound Prevention Protocol Ordered No      Belongings-   None

## 2023-11-06 NOTE — ASSESSMENT & PLAN NOTE
Chronic condition treated with atorvastatin.  Holding maintenance medication during acute septic illness.  11/10 atorvastatin resumed.

## 2023-11-06 NOTE — ANESTHESIA POSTPROCEDURE EVALUATION
Patient: Rubi Haq    Procedure Summary     Date: 11/05/23 Room / Location: [facility] 09 / SURGERY [facility]    Anesthesia Start: 1627 Anesthesia Stop: 1752    Procedure: LAPAROTOMY, EXPLORATORY,  SMALL BOWEL RESECTION vac greater than 50cm, abthera placement (Abdomen) Diagnosis: (mid gut volvulus, dead bowel)    Surgeons: Doroteo York D.O. Responsible Provider: Leonel Mena III, M.D.    Anesthesia Type: general ASA Status: 5 - Emergent          Final Anesthesia Type: general  Last vitals  BP   Blood Pressure : 121/56    Temp   37.2 °C (98.9 °F)    Pulse   96   Resp   18    SpO2   100 %      Anesthesia Post Evaluation    Patient location during evaluation: ICU  Patient participation: complete - patient cannot participate  Level of consciousness: obtunded/minimal responses  Pain score: 0    Airway patency: patent  Anesthetic complications: no  Cardiovascular status: adequate and hemodynamically stable  Respiratory status: acceptable, intubated, ETT and ventilator  Hydration status: acceptable    PONV: none  patient was unable to participate        No notable events documented.     Nurse Pain Score: 4 (NPRS)

## 2023-11-06 NOTE — CARE PLAN
Problem: Ventilation  Goal: Ability to achieve and maintain unassisted ventilation or tolerate decreased levels of ventilator support  Description: Target End Date:  4 days     Document on Vent flowsheet    1.  Support and monitor invasive and noninvasive mechanical ventilation  2.  Monitor ventilator weaning response  3.  Perform ventilator associated pneumonia prevention interventions  4.  Manage ventilation therapy by monitoring diagnostic test results  Outcome: Progressing     Ventilator Daily Summary    Vent Day # 2    Airway: EET 7.5@22    Ventilator settings: APVcmv/20/330/+8/30%    Weaning trials: none    Respiratory Procedures: none    Plan: Continue current ventilator settings and wean mechanical ventilation as tolerated per physician orders.

## 2023-11-07 ENCOUNTER — ANESTHESIA EVENT (OUTPATIENT)
Dept: SURGERY | Facility: MEDICAL CENTER | Age: 78
DRG: 853 | End: 2023-11-07
Payer: MEDICARE

## 2023-11-07 ENCOUNTER — ANESTHESIA (OUTPATIENT)
Dept: SURGERY | Facility: MEDICAL CENTER | Age: 78
DRG: 853 | End: 2023-11-07
Payer: MEDICARE

## 2023-11-07 ENCOUNTER — APPOINTMENT (OUTPATIENT)
Dept: RADIOLOGY | Facility: MEDICAL CENTER | Age: 78
DRG: 853 | End: 2023-11-07
Attending: STUDENT IN AN ORGANIZED HEALTH CARE EDUCATION/TRAINING PROGRAM
Payer: MEDICARE

## 2023-11-07 PROBLEM — Z78.9 NO CONTRAINDICATION TO DEEP VEIN THROMBOSIS (DVT) PROPHYLAXIS: Status: ACTIVE | Noted: 2023-11-07

## 2023-11-07 PROBLEM — D62 ACUTE BLOOD LOSS ANEMIA: Status: ACTIVE | Noted: 2023-11-07

## 2023-11-07 LAB
ALBUMIN SERPL BCP-MCNC: 2.2 G/DL (ref 3.2–4.9)
ALBUMIN/GLOB SERPL: 1 G/DL
ALP SERPL-CCNC: 55 U/L (ref 30–99)
ALT SERPL-CCNC: 10 U/L (ref 2–50)
ANION GAP SERPL CALC-SCNC: 10 MMOL/L (ref 7–16)
AST SERPL-CCNC: 22 U/L (ref 12–45)
BASOPHILS # BLD AUTO: 0 % (ref 0–1.8)
BASOPHILS # BLD: 0 K/UL (ref 0–0.12)
BILIRUB SERPL-MCNC: 0.3 MG/DL (ref 0.1–1.5)
BUN SERPL-MCNC: 35 MG/DL (ref 8–22)
CALCIUM ALBUM COR SERPL-MCNC: 9.6 MG/DL (ref 8.5–10.5)
CALCIUM SERPL-MCNC: 8.2 MG/DL (ref 8.5–10.5)
CHLORIDE SERPL-SCNC: 106 MMOL/L (ref 96–112)
CO2 SERPL-SCNC: 23 MMOL/L (ref 20–33)
CREAT SERPL-MCNC: 1.32 MG/DL (ref 0.5–1.4)
EOSINOPHIL # BLD AUTO: 0.12 K/UL (ref 0–0.51)
EOSINOPHIL NFR BLD: 0.9 % (ref 0–6.9)
ERYTHROCYTE [DISTWIDTH] IN BLOOD BY AUTOMATED COUNT: 48.4 FL (ref 35.9–50)
GFR SERPLBLD CREATININE-BSD FMLA CKD-EPI: 41 ML/MIN/1.73 M 2
GLOBULIN SER CALC-MCNC: 2.3 G/DL (ref 1.9–3.5)
GLUCOSE BLD STRIP.AUTO-MCNC: 104 MG/DL (ref 65–99)
GLUCOSE BLD STRIP.AUTO-MCNC: 104 MG/DL (ref 65–99)
GLUCOSE BLD STRIP.AUTO-MCNC: 111 MG/DL (ref 65–99)
GLUCOSE BLD STRIP.AUTO-MCNC: 66 MG/DL (ref 65–99)
GLUCOSE BLD STRIP.AUTO-MCNC: 91 MG/DL (ref 65–99)
GLUCOSE BLD STRIP.AUTO-MCNC: 94 MG/DL (ref 65–99)
GLUCOSE SERPL-MCNC: 97 MG/DL (ref 65–99)
HCT VFR BLD AUTO: 24.5 % (ref 37–47)
HGB BLD-MCNC: 8.2 G/DL (ref 12–16)
LYMPHOCYTES # BLD AUTO: 0.45 K/UL (ref 1–4.8)
LYMPHOCYTES NFR BLD: 3.5 % (ref 22–41)
MANUAL DIFF BLD: NORMAL
MCH RBC QN AUTO: 31.8 PG (ref 27–33)
MCHC RBC AUTO-ENTMCNC: 33.5 G/DL (ref 32.2–35.5)
MCV RBC AUTO: 95 FL (ref 81.4–97.8)
MONOCYTES # BLD AUTO: 0.33 K/UL (ref 0–0.85)
MONOCYTES NFR BLD AUTO: 2.6 % (ref 0–13.4)
MORPHOLOGY BLD-IMP: NORMAL
NEUTROPHILS # BLD AUTO: 11.9 K/UL (ref 1.82–7.42)
NEUTROPHILS NFR BLD: 89.5 % (ref 44–72)
NEUTS BAND NFR BLD MANUAL: 3.5 % (ref 0–10)
NRBC # BLD AUTO: 0 K/UL
NRBC BLD-RTO: 0 /100 WBC (ref 0–0.2)
PATHOLOGY CONSULT NOTE: NORMAL
PLATELET # BLD AUTO: 194 K/UL (ref 164–446)
PLATELET BLD QL SMEAR: NORMAL
PMV BLD AUTO: 10 FL (ref 9–12.9)
POTASSIUM SERPL-SCNC: 3.9 MMOL/L (ref 3.6–5.5)
PROT SERPL-MCNC: 4.5 G/DL (ref 6–8.2)
RBC # BLD AUTO: 2.58 M/UL (ref 4.2–5.4)
RBC BLD AUTO: NORMAL
SODIUM SERPL-SCNC: 139 MMOL/L (ref 135–145)
TRIGL SERPL-MCNC: 162 MG/DL (ref 0–149)
WBC # BLD AUTO: 12.8 K/UL (ref 4.8–10.8)

## 2023-11-07 PROCEDURE — 85027 COMPLETE CBC AUTOMATED: CPT

## 2023-11-07 PROCEDURE — 770022 HCHG ROOM/CARE - ICU (200)

## 2023-11-07 PROCEDURE — 160031 HCHG SURGERY MINUTES - 1ST 30 MINS LEVEL 5: Performed by: SURGERY

## 2023-11-07 PROCEDURE — 700111 HCHG RX REV CODE 636 W/ 250 OVERRIDE (IP): Performed by: ANESTHESIOLOGY

## 2023-11-07 PROCEDURE — 160002 HCHG RECOVERY MINUTES (STAT): Performed by: SURGERY

## 2023-11-07 PROCEDURE — 64488 TAP BLOCK BI INJECTION: CPT | Performed by: SURGERY

## 2023-11-07 PROCEDURE — 97606 NEG PRS WND THER DME>50 SQCM: CPT | Performed by: SURGERY

## 2023-11-07 PROCEDURE — 88307 TISSUE EXAM BY PATHOLOGIST: CPT

## 2023-11-07 PROCEDURE — 700105 HCHG RX REV CODE 258: Performed by: SURGERY

## 2023-11-07 PROCEDURE — 0DBA0ZZ EXCISION OF JEJUNUM, OPEN APPROACH: ICD-10-PCS | Performed by: SURGERY

## 2023-11-07 PROCEDURE — 3E0T3BZ INTRODUCTION OF ANESTHETIC AGENT INTO PERIPHERAL NERVES AND PLEXI, PERCUTANEOUS APPROACH: ICD-10-PCS | Performed by: ANESTHESIOLOGY

## 2023-11-07 PROCEDURE — 94799 UNLISTED PULMONARY SVC/PX: CPT

## 2023-11-07 PROCEDURE — 80053 COMPREHEN METABOLIC PANEL: CPT

## 2023-11-07 PROCEDURE — 94003 VENT MGMT INPAT SUBQ DAY: CPT

## 2023-11-07 PROCEDURE — 160035 HCHG PACU - 1ST 60 MINS PHASE I: Performed by: SURGERY

## 2023-11-07 PROCEDURE — 84478 ASSAY OF TRIGLYCERIDES: CPT

## 2023-11-07 PROCEDURE — 160048 HCHG OR STATISTICAL LEVEL 1-5: Performed by: SURGERY

## 2023-11-07 PROCEDURE — 71045 X-RAY EXAM CHEST 1 VIEW: CPT

## 2023-11-07 PROCEDURE — 160042 HCHG SURGERY MINUTES - EA ADDL 1 MIN LEVEL 5: Performed by: SURGERY

## 2023-11-07 PROCEDURE — 82962 GLUCOSE BLOOD TEST: CPT

## 2023-11-07 PROCEDURE — 0DBK0ZZ EXCISION OF ASCENDING COLON, OPEN APPROACH: ICD-10-PCS | Performed by: SURGERY

## 2023-11-07 PROCEDURE — 700105 HCHG RX REV CODE 258: Performed by: ANESTHESIOLOGY

## 2023-11-07 PROCEDURE — 94150 VITAL CAPACITY TEST: CPT

## 2023-11-07 PROCEDURE — 700101 HCHG RX REV CODE 250: Performed by: ANESTHESIOLOGY

## 2023-11-07 PROCEDURE — 700111 HCHG RX REV CODE 636 W/ 250 OVERRIDE (IP): Performed by: NURSE PRACTITIONER

## 2023-11-07 PROCEDURE — 0DTH0ZZ RESECTION OF CECUM, OPEN APPROACH: ICD-10-PCS | Performed by: SURGERY

## 2023-11-07 PROCEDURE — 700111 HCHG RX REV CODE 636 W/ 250 OVERRIDE (IP): Mod: JZ | Performed by: SURGERY

## 2023-11-07 PROCEDURE — 99024 POSTOP FOLLOW-UP VISIT: CPT | Mod: 57 | Performed by: SURGERY

## 2023-11-07 PROCEDURE — 99024 POSTOP FOLLOW-UP VISIT: CPT | Performed by: SURGERY

## 2023-11-07 PROCEDURE — 44160 REMOVAL OF COLON: CPT | Mod: 58 | Performed by: SURGERY

## 2023-11-07 PROCEDURE — 160009 HCHG ANES TIME/MIN: Performed by: SURGERY

## 2023-11-07 PROCEDURE — 85007 BL SMEAR W/DIFF WBC COUNT: CPT

## 2023-11-07 PROCEDURE — 49002 REOPENING OF ABDOMEN: CPT | Mod: 59 | Performed by: SURGERY

## 2023-11-07 RX ORDER — HALOPERIDOL 5 MG/ML
1 INJECTION INTRAMUSCULAR
Status: DISCONTINUED | OUTPATIENT
Start: 2023-11-07 | End: 2023-11-07 | Stop reason: HOSPADM

## 2023-11-07 RX ORDER — ROCURONIUM BROMIDE 10 MG/ML
INJECTION, SOLUTION INTRAVENOUS PRN
Status: DISCONTINUED | OUTPATIENT
Start: 2023-11-07 | End: 2023-11-07 | Stop reason: SURG

## 2023-11-07 RX ORDER — SODIUM CHLORIDE, SODIUM LACTATE, POTASSIUM CHLORIDE, CALCIUM CHLORIDE 600; 310; 30; 20 MG/100ML; MG/100ML; MG/100ML; MG/100ML
INJECTION, SOLUTION INTRAVENOUS CONTINUOUS
Status: DISCONTINUED | OUTPATIENT
Start: 2023-11-07 | End: 2023-11-07 | Stop reason: HOSPADM

## 2023-11-07 RX ORDER — OXYCODONE HCL 5 MG/5 ML
5 SOLUTION, ORAL ORAL
Status: DISCONTINUED | OUTPATIENT
Start: 2023-11-07 | End: 2023-11-07 | Stop reason: HOSPADM

## 2023-11-07 RX ORDER — LIDOCAINE HYDROCHLORIDE 40 MG/ML
SOLUTION TOPICAL PRN
Status: DISCONTINUED | OUTPATIENT
Start: 2023-11-07 | End: 2023-11-07 | Stop reason: SURG

## 2023-11-07 RX ORDER — LIDOCAINE HYDROCHLORIDE 20 MG/ML
INJECTION, SOLUTION EPIDURAL; INFILTRATION; INTRACAUDAL; PERINEURAL PRN
Status: DISCONTINUED | OUTPATIENT
Start: 2023-11-07 | End: 2023-11-07 | Stop reason: SURG

## 2023-11-07 RX ORDER — DEXAMETHASONE SODIUM PHOSPHATE 4 MG/ML
INJECTION, SOLUTION INTRA-ARTICULAR; INTRALESIONAL; INTRAMUSCULAR; INTRAVENOUS; SOFT TISSUE PRN
Status: DISCONTINUED | OUTPATIENT
Start: 2023-11-07 | End: 2023-11-07 | Stop reason: SURG

## 2023-11-07 RX ORDER — HYDRALAZINE HYDROCHLORIDE 20 MG/ML
5 INJECTION INTRAMUSCULAR; INTRAVENOUS
Status: DISCONTINUED | OUTPATIENT
Start: 2023-11-07 | End: 2023-11-07 | Stop reason: HOSPADM

## 2023-11-07 RX ORDER — BUPIVACAINE HYDROCHLORIDE 2.5 MG/ML
INJECTION, SOLUTION EPIDURAL; INFILTRATION; INTRACAUDAL
Status: COMPLETED | OUTPATIENT
Start: 2023-11-07 | End: 2023-11-07

## 2023-11-07 RX ORDER — SODIUM CHLORIDE, SODIUM LACTATE, POTASSIUM CHLORIDE, CALCIUM CHLORIDE 600; 310; 30; 20 MG/100ML; MG/100ML; MG/100ML; MG/100ML
INJECTION, SOLUTION INTRAVENOUS
Status: DISCONTINUED | OUTPATIENT
Start: 2023-11-07 | End: 2023-11-07 | Stop reason: SURG

## 2023-11-07 RX ORDER — HYDROMORPHONE HYDROCHLORIDE 1 MG/ML
0.1 INJECTION, SOLUTION INTRAMUSCULAR; INTRAVENOUS; SUBCUTANEOUS
Status: DISCONTINUED | OUTPATIENT
Start: 2023-11-07 | End: 2023-11-07 | Stop reason: HOSPADM

## 2023-11-07 RX ORDER — SUCCINYLCHOLINE CHLORIDE 20 MG/ML
INJECTION INTRAMUSCULAR; INTRAVENOUS PRN
Status: DISCONTINUED | OUTPATIENT
Start: 2023-11-07 | End: 2023-11-07 | Stop reason: SURG

## 2023-11-07 RX ORDER — HYDROMORPHONE HYDROCHLORIDE 1 MG/ML
0.2 INJECTION, SOLUTION INTRAMUSCULAR; INTRAVENOUS; SUBCUTANEOUS
Status: DISCONTINUED | OUTPATIENT
Start: 2023-11-07 | End: 2023-11-07 | Stop reason: HOSPADM

## 2023-11-07 RX ORDER — ONDANSETRON 2 MG/ML
INJECTION INTRAMUSCULAR; INTRAVENOUS PRN
Status: DISCONTINUED | OUTPATIENT
Start: 2023-11-07 | End: 2023-11-07 | Stop reason: SURG

## 2023-11-07 RX ORDER — OXYCODONE HCL 5 MG/5 ML
10 SOLUTION, ORAL ORAL
Status: DISCONTINUED | OUTPATIENT
Start: 2023-11-07 | End: 2023-11-07 | Stop reason: HOSPADM

## 2023-11-07 RX ORDER — EPHEDRINE SULFATE 50 MG/ML
5 INJECTION, SOLUTION INTRAVENOUS
Status: DISCONTINUED | OUTPATIENT
Start: 2023-11-07 | End: 2023-11-07 | Stop reason: HOSPADM

## 2023-11-07 RX ORDER — DEXAMETHASONE SODIUM PHOSPHATE 4 MG/ML
INJECTION, SOLUTION INTRA-ARTICULAR; INTRALESIONAL; INTRAMUSCULAR; INTRAVENOUS; SOFT TISSUE
Status: COMPLETED | OUTPATIENT
Start: 2023-11-07 | End: 2023-11-07

## 2023-11-07 RX ORDER — HYDROMORPHONE HYDROCHLORIDE 1 MG/ML
0.4 INJECTION, SOLUTION INTRAMUSCULAR; INTRAVENOUS; SUBCUTANEOUS
Status: DISCONTINUED | OUTPATIENT
Start: 2023-11-07 | End: 2023-11-07 | Stop reason: HOSPADM

## 2023-11-07 RX ADMIN — SODIUM CHLORIDE, POTASSIUM CHLORIDE, SODIUM LACTATE AND CALCIUM CHLORIDE: 600; 310; 30; 20 INJECTION, SOLUTION INTRAVENOUS at 21:22

## 2023-11-07 RX ADMIN — ONDANSETRON 8 MG: 2 INJECTION INTRAMUSCULAR; INTRAVENOUS at 11:39

## 2023-11-07 RX ADMIN — FENTANYL CITRATE 50 MCG: 50 INJECTION, SOLUTION INTRAMUSCULAR; INTRAVENOUS at 11:42

## 2023-11-07 RX ADMIN — FAMOTIDINE 20 MG: 10 INJECTION, SOLUTION INTRAVENOUS at 05:02

## 2023-11-07 RX ADMIN — PROPOFOL 20 MCG/KG/MIN: 10 INJECTION, EMULSION INTRAVENOUS at 02:23

## 2023-11-07 RX ADMIN — ROCURONIUM BROMIDE 10 MG: 50 INJECTION, SOLUTION INTRAVENOUS at 11:09

## 2023-11-07 RX ADMIN — SUCCINYLCHOLINE CHLORIDE 70 MG: 20 INJECTION, SOLUTION INTRAMUSCULAR; INTRAVENOUS at 10:21

## 2023-11-07 RX ADMIN — SODIUM CHLORIDE, POTASSIUM CHLORIDE, SODIUM LACTATE AND CALCIUM CHLORIDE: 600; 310; 30; 20 INJECTION, SOLUTION INTRAVENOUS at 10:15

## 2023-11-07 RX ADMIN — ROCURONIUM BROMIDE 20 MG: 50 INJECTION, SOLUTION INTRAVENOUS at 10:25

## 2023-11-07 RX ADMIN — LIDOCAINE HYDROCHLORIDE 100 MG: 20 INJECTION, SOLUTION EPIDURAL; INFILTRATION; INTRACAUDAL at 10:17

## 2023-11-07 RX ADMIN — SODIUM CHLORIDE, POTASSIUM CHLORIDE, SODIUM LACTATE AND CALCIUM CHLORIDE: 600; 310; 30; 20 INJECTION, SOLUTION INTRAVENOUS at 13:30

## 2023-11-07 RX ADMIN — DEXAMETHASONE SODIUM PHOSPHATE 4 MG: 4 INJECTION, SOLUTION INTRA-ARTICULAR; INTRALESIONAL; INTRAMUSCULAR; INTRAVENOUS; SOFT TISSUE at 11:34

## 2023-11-07 RX ADMIN — PIPERACILLIN AND TAZOBACTAM 4.5 G: 4; .5 INJECTION, POWDER, FOR SOLUTION INTRAVENOUS at 22:11

## 2023-11-07 RX ADMIN — PIPERACILLIN AND TAZOBACTAM 4.5 G: 4; .5 INJECTION, POWDER, FOR SOLUTION INTRAVENOUS at 05:08

## 2023-11-07 RX ADMIN — HYDRALAZINE HYDROCHLORIDE 5 MG: 20 INJECTION, SOLUTION INTRAMUSCULAR; INTRAVENOUS at 12:09

## 2023-11-07 RX ADMIN — PROPOFOL 20 MG: 10 INJECTION, EMULSION INTRAVENOUS at 11:45

## 2023-11-07 RX ADMIN — BUPIVACAINE HYDROCHLORIDE 60 ML: 2.5 INJECTION, SOLUTION EPIDURAL; INFILTRATION; INTRACAUDAL at 11:34

## 2023-11-07 RX ADMIN — PIPERACILLIN AND TAZOBACTAM 4.5 G: 4; .5 INJECTION, POWDER, FOR SOLUTION INTRAVENOUS at 13:39

## 2023-11-07 RX ADMIN — DEXAMETHASONE SODIUM PHOSPHATE 10 MG: 4 INJECTION INTRA-ARTICULAR; INTRALESIONAL; INTRAMUSCULAR; INTRAVENOUS; SOFT TISSUE at 10:24

## 2023-11-07 RX ADMIN — PROPOFOL 80 MG: 10 INJECTION, EMULSION INTRAVENOUS at 10:21

## 2023-11-07 RX ADMIN — DEXTROSE MONOHYDRATE 25 G: 100 INJECTION, SOLUTION INTRAVENOUS at 05:16

## 2023-11-07 RX ADMIN — SODIUM CHLORIDE, POTASSIUM CHLORIDE, SODIUM LACTATE AND CALCIUM CHLORIDE: 600; 310; 30; 20 INJECTION, SOLUTION INTRAVENOUS at 02:24

## 2023-11-07 RX ADMIN — LIDOCAINE HYDROCHLORIDE 4 ML: 40 SOLUTION TOPICAL at 10:22

## 2023-11-07 RX ADMIN — FAMOTIDINE 20 MG: 10 INJECTION, SOLUTION INTRAVENOUS at 17:12

## 2023-11-07 ASSESSMENT — FIBROSIS 4 INDEX: FIB4 SCORE: 2.33

## 2023-11-07 ASSESSMENT — PULMONARY FUNCTION TESTS: FVC: 0

## 2023-11-07 NOTE — OP REPORT
DATE OF OPERATION:   11/7/2023    PREOPERATIVE DIAGNOSIS:   Open abdomen following emergent laparotomy and bowel resection for internal hernia.    POSTOPERATIVE DIAGNOSIS:   Name.     PROCEDURE PERFORMED:  Exploratory laparotomy. Small bowel resection, single with primary anastomosis. Negative pressure dressing application (greater than 50 cm²)..    SURGEON:     Tobi Locke M.D.    ASSISTANT:     Rubi Yoder DNP.     ANESTHESIOLOGIST:   Batool Edwards M.D.    ANESTHESIA:    General endotracheal anesthesia. Regional anesthesia, transversus abdominus plane (TAP) block.    ASA CLASSIFICATION:   IV.    INDICATIONS: The patient is an 78 year-old elderly woman with an open abdomen after undergoing an emergent laparotomy, small bowel resection for internal hernia, and damage control [place].  She is taken to the operating room today for a planned second look laparotomy, restitution of gastrointestinal continuity, and delayed primary fascial closure.    The nature of the surgical procedure warranted additional skilled operative assistance from an Advanced Registered Nurse Practitioner (ARNP). The assistant was present for a substantial portion of the operation and provided assistance to the operating physician throughout the critical aspects of the procedure. The surgical assistant performed the following: provided assistance with optimal surgical exposure of the operative field, provided high complexity, subspecialty decision making input, and assisted with the surgical resection and reconstruction.    FINDINGS: No evidence for intra-abdominal hemorrhage.  Residual short segment of necrotic nonviable small intestine distal to the prior resection.  15 cm segment of devitalized and extremely torturous and adhesed small bowel within the dependent pelvis leading into a markedly thickened cecum.    WOUND CLASSIFICATION:  Class III, Contaminated.    SPECIMEN:      Ileocolic segment of intestine.    ESTIMATED  BLOOD LOSS:   Less than 5 mL.    DESCRIPTION OF PROCEDURE:  Following informed consent consent, the patient was properly identified, taken to the operating room and placed in supine position where general endotracheal anesthesia was administered. Intravenous antibiotics were administered in the preoperative setting within the correct time interval. The patient arrived with a previously placed Arshad catheter. Sequential compression devices were applied.  Active warming measures were employed.  The operative field was widely prepped and draped into a sterile field.    The peritoneum was accessed via the prior midline incision. Adhesiolysis was completed. A complete abdominal survey was conducted with the findings as detailed above.      An Ileocolic resection was carried out. The jejunum and ascending colon were resected proximal and distal to the the Ileocecal segment segment of devitalized and nonviable bowel with multiple firings of an ECHELON FLEX 45 mm ENDOPATH articulating endoscopic linear cutter stapler with Blue Regular staple loads. A functional end-to-end stapled anastomosis was carried out with multiple firings of an ECHELON FLEX 45 mm ENDOPATH articulating endoscopic linear cutter stapler with Blue Regular staple loads. The mesentery to the resected specimen was divided using with multiple firings of an ECHELON FLEX 45 mm ENDOPATH articulating endoscopic linear cutter stapler with White Vascular/Thin staple loads. Final inspection of the anastomosis demonstrated a widely patent lumen, no tension across the anastomosis, and satisfactory hemostasis. The mesentery to the resected specimen closed with interrupted 3-0 VICRYL® Plus Antibacterial sutures.     The abdominal contents were returned to the normal anatomic position with interposition of Seprafilm. The fascia was approximated with a pair of running #1 STRATAFIX™ Spiral PDS Plus Suture sutures. A VAC dressing was trimmed to the optimal dimensions and  placed within the subcutaneous tissue. A V.A.C.® dressing was secured to the edges of the wound with interrupted staples. The self-adhesive drape was applied and connected to closed suction drainage.     The patient tolerated the procedure well, and there were no apparent complications. All sponge, needle, and instrument counts were correct on 2 separate occasions. The patient was awakened, extubated, and transferred to  to the post anesthesia care unit (PACU) in satisfactory condition.       ____________________________________     Tobi Locke M.D.    DD: 11/7/2023  12:33 PM

## 2023-11-07 NOTE — ANESTHESIA POSTPROCEDURE EVALUATION
Patient: Rubi Haq    Procedure Summary     Date: 11/07/23 Room / Location: [place] [address] / SURGERY [facility]    Anesthesia Start: 1015 Anesthesia Stop: 1155    Procedures:       LAPAROTOMY, EXPLORATORY 2ND LOOK (Abdomen)      CLOSURE, WOUND (Abdomen)      EXCISION, INTESTINE (Abdomen) Diagnosis: (HIGH GRADE MECHANICAL BOWEL OBSTRUCTION, OPEN ABDOMEN)    Surgeons: Tobi Locke M.D. Responsible Provider: Batool Edwards M.D.    Anesthesia Type: general ASA Status: 4 - Emergent          Final Anesthesia Type: general  Last vitals  BP   Blood Pressure : (!) 156/68, Arterial BP: (!) 56/53    Temp   36.2 °C (97.1 °F)    Pulse   88   Resp   17    SpO2   100 %      Anesthesia Post Evaluation    Patient location during evaluation: PACU  Patient participation: complete - patient participated  Level of consciousness: awake and alert    Airway patency: patent  Anesthetic complications: no  Cardiovascular status: hemodynamically stable  Respiratory status: acceptable  Hydration status: euvolemic    PONV: none          No notable events documented.     Nurse Pain Score: 4 (NPRS)

## 2023-11-07 NOTE — ANESTHESIA PREPROCEDURE EVALUATION
Case: [id number] Date/Time: 11/07/23 0938    Procedures:       LAPAROTOMY, EXPLORATORY 2ND LOOK      CLOSURE, WOUND    Location: [facility] OR 10 / SURGERY [facility]    Surgeons: Tobi Locke M.D.        77yo F who is POD2 from an emergent ex lap for necrotic bowel from small bowel volvulus, having had large segment small bowel removed.  Left open with Abthera and to ICU intubated and on pressors.  Now weaned off pressors. Renal function improving. On lovenox and has had significant Hg drop.    Self extubated this morning and is awake and doing fairly well.    To OR today for second look ex lap and possible closure    Relevant Problems   CARDIAC   (positive) Acute deep vein thrombosis (DVT) (HCC)      ENDO   (positive) Hypothyroid      Other   (positive) History of endometrial cancer   (positive) Respiratory failure following trauma and surgery (HCC)   (positive) Sepsis (HCC)       Physical Exam    Airway - unable to assess       Cardiovascular - normal exam  Rhythm: regular  Rate: normal     Dental     Unable to assess dental       Pulmonary - normal exam  (+) decreased breath sounds, rales  (-) wheezes     Abdominal    Neurological - sedated/unconcious                 Anesthesia Plan    ASA 4- EMERGENT   ASA physical status 4 criteria: sepsisASA physical status emergent criteria: sepsis    Plan - general       Airway plan will be ETT          Induction: intravenous    Postoperative Plan: Postoperative administration of opioids is intended.    Pertinent diagnostic labs and testing reviewed    Informed Consent:    Anesthetic plan and risks discussed with patient and healthcare power of  (daughter present and signing consent).    Use of blood products discussed with: patient whom consented to blood products.          V-Y Plasty Text: The defect edges were debeveled with a #15 scalpel blade.  Given the location of the defect, shape of the defect and the proximity to free margins an V-Y advancement flap was deemed most appropriate.  Using a sterile surgical marker, an appropriate advancement flap was drawn incorporating the defect and placing the expected incisions within the relaxed skin tension lines where possible.    The area thus outlined was incised deep to adipose tissue with a #15 scalpel blade.  The skin margins were undermined to an appropriate distance in all directions utilizing iris scissors.

## 2023-11-07 NOTE — ANESTHESIA TIME REPORT
Anesthesia Start and Stop Event Times     Date Time Event    11/7/2023 1004 Ready for Procedure     1015 Anesthesia Start     1155 Anesthesia Stop        Responsible Staff  11/07/23    Name Role Begin End    Batool Edwards M.D. Anesth 1015 1155        Overtime Reason:  no overtime (within assigned shift)    Comments:

## 2023-11-07 NOTE — DISCHARGE PLANNING
Case Management Discharge Planning    Admission Date: 11/5/2023  GMLOS: 9.9  ALOS: 2    TRANSFER BACK PATIENT    Referring Facility: [facility]    Reason for Transfer: General Surgery Consult, higher level of care for abdominal pain, SBO.    Verbal Repatriation/Transfer Back between Transfer Center and [facility] due to their complete downtime and unable to return signed fax.    Once this patient has received the requested service or the patient no longer requires tertiary level of care from [facility] of [facility] and is stable to transfer back to referring facility, we can facilitate a transfer back. Please contact the [facility] at [phone] to coordinate this transfer request.

## 2023-11-07 NOTE — PROGRESS NOTES
4 Eyes Skin Assessment Completed by JESUS Gibbs and JESUS Garcia    Skin assessment is primarily focused on high risk bony prominences. Pay special attention to skin beneath and around medical devices, high risk bony prominences, skin to skin areas and areas where the patient lacks sensation to feel pain and areas where the patient previously had breakdown.       Left eyelid petechiae  Blanching reddness to nose  Scar to left breast  Midline abdominal incision, with vac in place  Skin tear to left arm  Reddness to left and right buttocks, slow to wilda    HIGH RISK PRESSURE POINTS -  Sacrum/Coccyx Red and Blanching  Right ischial tuberosity (Sit Bones) WDL  Left Ischial Tuberosity (Sit Bones) WDL  Right Heel Red and Blanching  Left Heel Red and Blanching    HEAD & NECK DEVICES:  Occipital Region WDL  Right Ear Red and Blanching  Left Ear Red and Blanching  Chin WDL  Neck WDL  Sternum WDL  NA, Intact with no device in use    RESPIRATORY DEVICES:  Lips WDL  Tongue WDL  Right Cheek WDL  Left Cheek WDL  Bridge of Nose Red and Blanching  Oxygen Mask    FEEDING DEVICES:  Nasal Septum Red  NA    TORSO DEVICES:  Scapula WDL  Spine WDL  Back WDL  Abdomen  Wound vac in place  Right Flank WDL  Left Flank WDL  Wound vac     LINES, BP MONITORING DEVICES IN USE:  Right Elbow, Forearm, Wrist, Hand Red and Bruising and edema  Left Elbow, Forearm, Wrist, Hand Red, Blanching, Bruising, and Edema  BP Cuff and Pulse Ox    ORTHOPEDIC DEVICES:  Right Hip WDL  Left Hip WDL  Right Thigh WDL  Left Thigh WDL  Right Leg WDL  Left Leg WDL  Right Achilles Red and Blanching  Left Achilles Red and Blanching  Right Foot WDL  Left Foot WDL  NA    MISCELLANEOUS:  Axilla WDL  Groin WDL  Perineum WDL  Buttocks/Shira-rectal Red and Blanching  Drains, Arshad, and SCDs    PROTOCOL INTERVENTIONS:   ICU Low Airloss Bed Already in place  Q2 turns with pillows Already in place and Applied this assessment  Q2 turns with TAPs System Already in place and Ordered via  RN wound protocol    WOUND PHOTOS:   Sacral reddness    WOUND CONSULT:   Consult ordered for the following areas Sacrum/buttocks

## 2023-11-07 NOTE — CARE PLAN
The patient is Watcher - Medium risk of patient condition declining or worsening    Shift Goals  Clinical Goals: Surgery, comfort, rest  Patient Goals: AMELIA  Family Goals: AMELIA    Progress made toward(s) clinical / shift goals:    Problem: Knowledge Deficit - Standard  Goal: Patient and family/care givers will demonstrate understanding of plan of care, disease process/condition, diagnostic tests and medications  Outcome: Progressing     Problem: Hemodynamics  Goal: Patient's hemodynamics, fluid balance and neurologic status will be stable or improve  Outcome: Progressing     Problem: Fluid Volume  Goal: Fluid volume balance will be maintained  Outcome: Progressing     Problem: Urinary - Renal Perfusion  Goal: Ability to achieve and maintain adequate renal perfusion and functioning will improve  Outcome: Progressing     Problem: Respiratory  Goal: Patient will achieve/maintain optimum respiratory ventilation and gas exchange  Outcome: Progressing     Problem: Mechanical Ventilation  Goal: Safe management of artificial airway and ventilation  Outcome: Progressing  Goal: Successful weaning off mechanical ventilator, spontaneously maintains adequate gas exchange  Outcome: Progressing  Goal: Patient will be able to express needs and understand communication  Outcome: Progressing     Problem: Physical Regulation  Goal: Diagnostic test results will improve  Outcome: Progressing  Goal: Signs and symptoms of infection will decrease  Outcome: Progressing     Problem: Pain - Standard  Goal: Alleviation of pain or a reduction in pain to the patient’s comfort goal  Outcome: Progressing     Problem: Skin Integrity  Goal: Skin integrity is maintained or improved  Outcome: Progressing     Problem: Fall Risk  Goal: Patient will remain free from falls  Outcome: Progressing     Problem: Safety - Medical Restraint  Goal: Remains free of injury from restraints (Restraint for Interference with Medical Device)  Outcome: Progressing  Goal:  Free from restraint(s) (Restraint for Interference with Medical Device)  Outcome: Progressing       Patient is not progressing towards the following goals:

## 2023-11-07 NOTE — ANESTHESIA PROCEDURE NOTES
Airway    Date/Time: 11/7/2023 10:22 AM    Performed by: Batool Edwards M.D.  Authorized by: Batool Edwards M.D.    Location:  OR  Urgency:  Elective  Indications for Airway Management:  Anesthesia      Spontaneous Ventilation: absent    Sedation Level:  Deep  Preoxygenated: Yes    Patient Position:  Sniffing  Mask Difficulty Assessment:  0 - not attempted  Final Airway Type:  Endotracheal airway  Final Endotracheal Airway:  ETT  Cuffed: Yes    Technique Used for Successful ETT Placement:  Direct laryngoscopy    Insertion Site:  Oral  Blade Type:  Alberto  Laryngoscope Blade/Videolaryngoscope Blade Size:  3  ETT Size (mm):  7.0  Measured from:  Teeth  ETT to Teeth (cm):  22  Placement Verified by: auscultation and capnometry    Cormack-Lehane Classification:  Grade I - full view of glottis  Number of Attempts at Approach:  1

## 2023-11-07 NOTE — PROGRESS NOTES
"  DATE: 11/7/2023    Post Operative Day  2  Exploratory laparotomy, segmental small bowel resection for internal hernia with necrotic bowel, and damage control closure.    INTERVAL EVENTS:  Vasopressors weaned off.    Renal function improved.  Lovenox initiated.  Significant hemoglobin drop.    PHYSICAL EXAMINATION:  Vital Signs: BP 94/62   Pulse 85   Temp 37.2 °C (98.9 °F) (Temporal)   Resp 15   Ht 1.626 m (5' 4\")   Wt 67.9 kg (149 lb 11.1 oz)   SpO2 100%     Alert, follows commands briskly.  Mechanically ventilated with minimal ventilatory support.  Regular rate and rhythm.  Vasopressors off.  The abdomen is soft and diffusely tender.  ABThera functioning.    LABORATORY VALUES:   Recent Labs     11/05/23  1432 11/06/23 0450 11/07/23  0435   WBC 18.2* 18.6* 12.8*   RBC 3.69* 3.17* 2.58*   HEMOGLOBIN 11.5* 10.1* 8.2*   HEMATOCRIT 36.5* 31.3* 24.5*   MCV 98.9* 98.7* 95.0   MCH 31.2 31.9 31.8   MCHC 31.5* 32.3 33.5   RDW 50.2* 50.9* 48.4   PLATELETCT 240 249 194   MPV 10.2 9.9 10.0     Recent Labs     11/05/23  1432 11/06/23 0450 11/07/23  0435   SODIUM 140 139 139   POTASSIUM 5.1 4.7 3.9   CHLORIDE 104 105 106   CO2 21 21 23   GLUCOSE 101* 121* 97   BUN 36* 37* 35*   CREATININE 2.13* 1.66* 1.32   CALCIUM 8.9 8.5 8.2*     IMAGING:   Portable chest x-ray demonstrated clear lung fields.      ASSESSMENT AND PLAN:  Satisfactory progress following emergent laparotomy for necrotic bowel secondary to internal hernia.    PREOPERATIVE NOTE: I have seen and examined the patient today.  Critical physical examination findings, laboratory indices, and radiographic studies reviewed.  I recommend second look laparotomy, abdominal washout, inspection of remaining intestine and possible small bowel anastomosis, possible delayed primary fascial closure.    The operative strategy was explained and reviewed. Alternatives discussed. Potential complications including, but not limited to, infection, bleeding, damage to adjacent " structures, and anesthetic complications were discussed. Questions were elicited and answered to the patient's decision maker's satisfaction.  Operative consent signed.  Steady  Appreciate ICU and consulting physician care.       ____________________________________     Tobi Locke M.D.    DD: 11/7/2023  7:25 AM

## 2023-11-07 NOTE — CARE PLAN
"The patient is Watcher - Medium risk of patient condition declining or worsening    Shift Goals  Clinical Goals: Wean vasopressors and maintain MAP >65  Patient Goals: Nods yes to \"rest\"  Family Goals: \"Let her rest\"    Progress made toward(s) clinical / shift goals:    Problem: Knowledge Deficit - Standard  Goal: Patient and family/care givers will demonstrate understanding of plan of care, disease process/condition, diagnostic tests and medications  11/6/2023 1716 by Suzette Larsen R.N.  Outcome: Progressing  11/6/2023 1715 by Suzette Larsen R.N.  Outcome: Progressing     Problem: Hemodynamics  Goal: Patient's hemodynamics, fluid balance and neurologic status will be stable or improve  Outcome: Progressing     Problem: Fluid Volume  Goal: Fluid volume balance will be maintained  Outcome: Progressing     Problem: Urinary - Renal Perfusion  Goal: Ability to achieve and maintain adequate renal perfusion and functioning will improve  Outcome: Progressing     Problem: Mechanical Ventilation  Goal: Safe management of artificial airway and ventilation  Outcome: Progressing  Goal: Successful weaning off mechanical ventilator, spontaneously maintains adequate gas exchange  Outcome: Progressing  Goal: Patient will be able to express needs and understand communication  Outcome: Progressing     Problem: Pain - Standard  Goal: Alleviation of pain or a reduction in pain to the patient’s comfort goal  Outcome: Progressing     Problem: Skin Integrity  Goal: Skin integrity is maintained or improved  Outcome: Progressing     Problem: Fall Risk  Goal: Patient will remain free from falls  Outcome: Progressing     Problem: Safety - Medical Restraint  Goal: Remains free of injury from restraints (Restraint for Interference with Medical Device)  Outcome: Progressing         " Is the patient currently in the state of [place]? YES    Visit mode:VIDEO    If the visit is dropped, the patient can be reconnected by: VIDEO VISIT: Text to cell phone: [phone]    Will anyone else be joining the visit? NO      How would you like to obtain your AVS? MyChart    Are changes needed to the allergy or medication list? NO    Reason for visit: Consult

## 2023-11-07 NOTE — ANESTHESIA PROCEDURE NOTES
Peripheral Block    Date/Time: 11/7/2023 11:34 AM    Performed by: Batool Edwards M.D.  Authorized by: Batool Edwards M.D.    Patient Location:  OR  Start Time:  11/7/2023 11:34 AM  End Time:  11/7/2023 11:37 AM  Reason for Block: at surgeon's request and post-op pain management ONLY    patient identified, IV checked, site marked, risks and benefits discussed, surgical consent, monitors and equipment checked, pre-op evaluation and timeout performed    Patient Position:  Supine  Prep: ChloraPrep    Monitoring:  Heart rate, continuous pulse ox and cardiac monitor  Block Region:  Trunk  Trunk - Block Type:  Abdominal plane block - TAP block    Laterality:  Bilateral  Procedures: ultrasound guided  Image captured, interpreted and electronically stored.  Block Type:  Single-shot  Needle Length:  100mm  Needle Gauge:  21 G  Needle Localization:  Ultrasound guidance  Ultrasound picture in chart  Injection Assessment:  Negative aspiration for heme, no paresthesia on injection, incremental injection and local visualized surrounding nerve on ultrasound  Evidence of intravascular injection: No     US Guided Transversus Abdominis Plane (TAP) Block   US probe placed at the anterior axillary line between the costal margin and iliac crest in an axial plane. External Oblique, Internal Oblique (IO) and Transversis Abdominis (TA) muscles identified.  Needle inserted anteromedial to probe in an in plane approach and advanced under direct visualization to TAP between IO and TA muscled.  After negative aspiration LA injected with ease and visualized spreading in TAP plane.     30cc each side

## 2023-11-07 NOTE — CARE PLAN
Problem: Hemodynamics  Goal: Patient's hemodynamics, fluid balance and neurologic status will be stable or improve  Outcome: Progressing     Problem: Fluid Volume  Goal: Fluid volume balance will be maintained  Outcome: Progressing     Problem: Urinary - Renal Perfusion  Goal: Ability to achieve and maintain adequate renal perfusion and functioning will improve  Outcome: Progressing     Problem: Pain - Standard  Goal: Alleviation of pain or a reduction in pain to the patient’s comfort goal  Outcome: Progressing     Problem: Safety - Medical Restraint  Goal: Remains free of injury from restraints (Restraint for Interference with Medical Device)  Outcome: Progressing  Goal: Free from restraint(s) (Restraint for Interference with Medical Device)  Outcome: Progressing     The patient is Watcher - Medium risk of patient condition declining or worsening    Shift Goals  Clinical Goals: surgery tomorrow  Patient Goals: AMELIA  Family Goals: AMELIA    Progress made toward(s) clinical / shift goals:  stable vitals    Patient is not progressing towards the following goals: n/a

## 2023-11-07 NOTE — PROGRESS NOTES
Trauma / Surgical Daily Progress Note    Date of Service  11/7/2023    Chief Complaint  78 y.o. female admitted 11/5/2023 with Small bowel obstruction    Interval Events  To OR for bowel anastomosis and closure  Resp:  self extubated pre op, extubated post op  UO adequate  AB: zosyn  Lovenox begin post op    Review of Systems  Review of Systems     Vital Signs for last 24 hours  Temp:  [36.2 °C (97.1 °F)-37.3 °C (99.1 °F)] 37.3 °C (99.1 °F)  Pulse:  [] 81  Resp:  [12-42] 15  BP: ()/() 138/63  SpO2:  [99 %-100 %] 100 %    Hemodynamic parameters for last 24 hours       Respiratory Data     Respiration: 15, Pulse Oximetry: 100 %     Work Of Breathing / Effort: Vented  RUL Breath Sounds: Clear, RML Breath Sounds: Diminished, RLL Breath Sounds: Diminished, ESTHER Breath Sounds: Clear, LLL Breath Sounds: Diminished    Physical Exam  Physical Exam    Laboratory  Recent Results (from the past 24 hour(s))   POCT glucose device results    Collection Time: 11/06/23  5:38 PM   Result Value Ref Range    POC Glucose, Blood 89 65 - 99 mg/dL   EC-ECHOCARDIOGRAM COMPLETE W/ CONT    Collection Time: 11/06/23  8:05 PM   Result Value Ref Range    Eject.Frac. MOD BP 61.73     Eject.Frac. MOD 4C 61.32     Eject.Frac. MOD 2C 63.6     Left Ventrical Ejection Fraction 60    POCT glucose device results    Collection Time: 11/07/23 12:13 AM   Result Value Ref Range    POC Glucose, Blood 91 65 - 99 mg/dL   CBC with Differential: Tomorrow AM    Collection Time: 11/07/23  4:35 AM   Result Value Ref Range    WBC 12.8 (H) 4.8 - 10.8 K/uL    RBC 2.58 (L) 4.20 - 5.40 M/uL    Hemoglobin 8.2 (L) 12.0 - 16.0 g/dL    Hematocrit 24.5 (L) 37.0 - 47.0 %    MCV 95.0 81.4 - 97.8 fL    MCH 31.8 27.0 - 33.0 pg    MCHC 33.5 32.2 - 35.5 g/dL    RDW 48.4 35.9 - 50.0 fL    Platelet Count 194 164 - 446 K/uL    MPV 10.0 9.0 - 12.9 fL    Neutrophils-Polys 89.50 (H) 44.00 - 72.00 %    Lymphocytes 3.50 (L) 22.00 - 41.00 %    Monocytes 2.60 0.00 - 13.40  %    Eosinophils 0.90 0.00 - 6.90 %    Basophils 0.00 0.00 - 1.80 %    Nucleated RBC 0.00 0.00 - 0.20 /100 WBC    Neutrophils (Absolute) 11.90 (H) 1.82 - 7.42 K/uL    Lymphs (Absolute) 0.45 (L) 1.00 - 4.80 K/uL    Monos (Absolute) 0.33 0.00 - 0.85 K/uL    Eos (Absolute) 0.12 0.00 - 0.51 K/uL    Baso (Absolute) 0.00 0.00 - 0.12 K/uL    NRBC (Absolute) 0.00 K/uL   Comp Metabolic Panel (CMP): Tomorrow AM    Collection Time: 11/07/23  4:35 AM   Result Value Ref Range    Sodium 139 135 - 145 mmol/L    Potassium 3.9 3.6 - 5.5 mmol/L    Chloride 106 96 - 112 mmol/L    Co2 23 20 - 33 mmol/L    Anion Gap 10.0 7.0 - 16.0    Glucose 97 65 - 99 mg/dL    Bun 35 (H) 8 - 22 mg/dL    Creatinine 1.32 0.50 - 1.40 mg/dL    Calcium 8.2 (L) 8.5 - 10.5 mg/dL    Correct Calcium 9.6 8.5 - 10.5 mg/dL    AST(SGOT) 22 12 - 45 U/L    ALT(SGPT) 10 2 - 50 U/L    Alkaline Phosphatase 55 30 - 99 U/L    Total Bilirubin 0.3 0.1 - 1.5 mg/dL    Albumin 2.2 (L) 3.2 - 4.9 g/dL    Total Protein 4.5 (L) 6.0 - 8.2 g/dL    Globulin 2.3 1.9 - 3.5 g/dL    A-G Ratio 1.0 g/dL   Triglyceride    Collection Time: 11/07/23  4:35 AM   Result Value Ref Range    Triglycerides 162 (H) 0 - 149 mg/dL   ESTIMATED GFR    Collection Time: 11/07/23  4:35 AM   Result Value Ref Range    GFR (CKD-EPI) 41 (A) >60 mL/min/1.73 m 2   DIFFERENTIAL MANUAL    Collection Time: 11/07/23  4:35 AM   Result Value Ref Range    Bands-Stabs 3.50 0.00 - 10.00 %    Manual Diff Status PERFORMED    PERIPHERAL SMEAR REVIEW    Collection Time: 11/07/23  4:35 AM   Result Value Ref Range    Peripheral Smear Review see below    PLATELET ESTIMATE    Collection Time: 11/07/23  4:35 AM   Result Value Ref Range    Plt Estimation Normal    MORPHOLOGY    Collection Time: 11/07/23  4:35 AM   Result Value Ref Range    RBC Morphology Normal    POCT glucose device results    Collection Time: 11/07/23  5:04 AM   Result Value Ref Range    POC Glucose, Blood 66 65 - 99 mg/dL   POCT glucose device results     Collection Time: 11/07/23  5:49 AM   Result Value Ref Range    POC Glucose, Blood 104 (H) 65 - 99 mg/dL   POCT glucose device results    Collection Time: 11/07/23  6:47 AM   Result Value Ref Range    POC Glucose, Blood 111 (H) 65 - 99 mg/dL   Histology Request    Collection Time: 11/07/23 11:07 AM   Result Value Ref Range    Pathology Request Sent to Histo    POCT glucose device results    Collection Time: 11/07/23  1:44 PM   Result Value Ref Range    POC Glucose, Blood 94 65 - 99 mg/dL       Fluids    Intake/Output Summary (Last 24 hours) at 11/7/2023 1643  Last data filed at 11/7/2023 1600  Gross per 24 hour   Intake 3853.56 ml   Output 2480 ml   Net 1373.56 ml       Core Measures & Quality Metrics  Labs reviewed, Medications reviewed and Radiology images reviewed  Arshad catheter: Critically Ill - Requiring Accurate Measurement of Urinary Output      DVT Prophylaxis: Enoxaparin (Lovenox)  DVT prophylaxis - mechanical: SCDs    Antibiotics: Treating active infection/contamination beyond 24 hours perioperative coverage      RAP Score Total: 0    ETOH Screening    Assessment/Plan  * Small bowel obstruction (HCC)- (present on admission)  Assessment & Plan  11/5 Emergent Exploratory laparotomy with bowel resection.  Zosyn initiated  -Left in discontinuity   -ABtherra in place.  11/7 Additional small bowel resected at ileum, and proximal anastomosis , fascial closure.    11/7 Zosyn day 3 of 5 .    Sepsis (HCC)- (present on admission)  Assessment & Plan  This is Septic shock Present on admission  SIRS criteria identified on my evaluation include: Tachycardia, with heart rate greater than 90 BPM and Tachypnea, with respirations greater than 20 per minute  Clinical indicators of end organ dysfunction include Hypotension with systolic blood pressure less than 90 or MAP less than 65   Sources is: necrotic bowel  Sepsis protocol initiated  Crystalloid Fluid Administration: Fluid resuscitation ordered per standard protocol - 30  mL/kg per current or ideal body weight  IV antibiotics as appropriate for source of sepsis  End points assessed.   IV pressor support initiated in OR    No contraindication to deep vein thrombosis (DVT) prophylaxis- (present on admission)  Assessment & Plan  11/7 begin Lovenox post op    Respiratory failure following trauma and surgery (HCC)- (present on admission)  Assessment & Plan  Left intubated post op  Continue full mechanical ventilatory support. Ventilator bundle and Trauma weaning protocol.  11/7 Extubated post op    Elevated serum creatinine- (present on admission)  Assessment & Plan  IV hydration and trend.    HLD (hyperlipidemia)- (present on admission)  Assessment & Plan  Chronic condition treated with Zocor.  Holding maintenance medication during acute septic illness.    Bilateral leg edema- (present on admission)  Assessment & Plan  Chronic condition     Hypothyroid- (present on admission)  Assessment & Plan  Chronic condition treated with Synthroid.  Holding maintenance medication during acute septic illness.        Discussed patient condition with Family, RN, RT, Pharmacy, and Dietary.  CRITICAL CARE TIME EXCLUDING PROCEDURES: 35    minutes.Decision making of high complexity.  I reviewed clinical labs, trends and orders for follow up.  Review of imaging,reports, consultant documentation .  Utilization of the information in todays decision making.   I evaluated the patient condition at bedside and discussed the daily plan(s) with available nursing staff,  pharmacists on rounds.  Mechanical ventilation

## 2023-11-07 NOTE — CARE PLAN
Problem: Ventilation  Goal: Ability to achieve and maintain unassisted ventilation or tolerate decreased levels of ventilator support  Description: Target End Date:  4 days     Document on Vent flowsheet    1.  Support and monitor invasive and noninvasive mechanical ventilation  2.  Monitor ventilator weaning response  3.  Perform ventilator associated pneumonia prevention interventions  4.  Manage ventilation therapy by monitoring diagnostic test results  Outcome: Not Progressing     Ventilator Daily Summary    Vent Day #3    Airway: 7.5/22    Ventilator settings: asv 110/8/30%    Weaning trials: no    Respiratory Procedures:no    Plan: Continue current ventilator settings and wean mechanical ventilation as tolerated per physician orders.

## 2023-11-07 NOTE — PROGRESS NOTES
Updated Dr Odom on low urine output and overall patient presentation. Orders received and implemented.

## 2023-11-08 ENCOUNTER — APPOINTMENT (OUTPATIENT)
Dept: RADIOLOGY | Facility: MEDICAL CENTER | Age: 78
DRG: 853 | End: 2023-11-08
Attending: STUDENT IN AN ORGANIZED HEALTH CARE EDUCATION/TRAINING PROGRAM
Payer: MEDICARE

## 2023-11-08 LAB
ALBUMIN SERPL BCP-MCNC: 1.9 G/DL (ref 3.2–4.9)
ALBUMIN/GLOB SERPL: 0.7 G/DL
ALP SERPL-CCNC: 60 U/L (ref 30–99)
ALT SERPL-CCNC: 14 U/L (ref 2–50)
ANION GAP SERPL CALC-SCNC: 12 MMOL/L (ref 7–16)
AST SERPL-CCNC: 22 U/L (ref 12–45)
BASOPHILS # BLD AUTO: 0 % (ref 0–1.8)
BASOPHILS # BLD: 0 K/UL (ref 0–0.12)
BILIRUB SERPL-MCNC: 0.2 MG/DL (ref 0.1–1.5)
BUN SERPL-MCNC: 27 MG/DL (ref 8–22)
CALCIUM ALBUM COR SERPL-MCNC: 9.7 MG/DL (ref 8.5–10.5)
CALCIUM SERPL-MCNC: 8 MG/DL (ref 8.5–10.5)
CHLORIDE SERPL-SCNC: 108 MMOL/L (ref 96–112)
CO2 SERPL-SCNC: 21 MMOL/L (ref 20–33)
CREAT SERPL-MCNC: 0.92 MG/DL (ref 0.5–1.4)
EOSINOPHIL # BLD AUTO: 0 K/UL (ref 0–0.51)
EOSINOPHIL NFR BLD: 0 % (ref 0–6.9)
ERYTHROCYTE [DISTWIDTH] IN BLOOD BY AUTOMATED COUNT: 49 FL (ref 35.9–50)
GFR SERPLBLD CREATININE-BSD FMLA CKD-EPI: 63 ML/MIN/1.73 M 2
GLOBULIN SER CALC-MCNC: 2.6 G/DL (ref 1.9–3.5)
GLUCOSE BLD STRIP.AUTO-MCNC: 105 MG/DL (ref 65–99)
GLUCOSE BLD STRIP.AUTO-MCNC: 106 MG/DL (ref 65–99)
GLUCOSE BLD STRIP.AUTO-MCNC: 96 MG/DL (ref 65–99)
GLUCOSE BLD STRIP.AUTO-MCNC: 98 MG/DL (ref 65–99)
GLUCOSE SERPL-MCNC: 111 MG/DL (ref 65–99)
HCT VFR BLD AUTO: 24.1 % (ref 37–47)
HGB BLD-MCNC: 7.9 G/DL (ref 12–16)
HGB RETIC QN AUTO: 25.1 PG/CELL (ref 29–35)
IMM RETICS NFR: 20.3 % (ref 2.6–16.1)
IRON SATN MFR SERPL: 11 % (ref 15–55)
IRON SERPL-MCNC: 13 UG/DL (ref 40–170)
LYMPHOCYTES # BLD AUTO: 0.41 K/UL (ref 1–4.8)
LYMPHOCYTES NFR BLD: 2.6 % (ref 22–41)
MANUAL DIFF BLD: NORMAL
MCH RBC QN AUTO: 31.1 PG (ref 27–33)
MCHC RBC AUTO-ENTMCNC: 32.8 G/DL (ref 32.2–35.5)
MCV RBC AUTO: 94.9 FL (ref 81.4–97.8)
METAMYELOCYTES NFR BLD MANUAL: 0.8 %
MONOCYTES # BLD AUTO: 0.27 K/UL (ref 0–0.85)
MONOCYTES NFR BLD AUTO: 1.7 % (ref 0–13.4)
MORPHOLOGY BLD-IMP: NORMAL
NEUTROPHILS # BLD AUTO: 14.8 K/UL (ref 1.82–7.42)
NEUTROPHILS NFR BLD: 93.2 % (ref 44–72)
NEUTS BAND NFR BLD MANUAL: 1.7 % (ref 0–10)
NRBC # BLD AUTO: 0 K/UL
NRBC BLD-RTO: 0 /100 WBC (ref 0–0.2)
PLATELET # BLD AUTO: 213 K/UL (ref 164–446)
PLATELET BLD QL SMEAR: NORMAL
PMV BLD AUTO: 9.6 FL (ref 9–12.9)
POTASSIUM SERPL-SCNC: 3.7 MMOL/L (ref 3.6–5.5)
PROT SERPL-MCNC: 4.5 G/DL (ref 6–8.2)
RBC # BLD AUTO: 2.54 M/UL (ref 4.2–5.4)
RBC BLD AUTO: NORMAL
RETICS # AUTO: 0.05 M/UL (ref 0.04–0.12)
RETICS/RBC NFR: 2 % (ref 0.8–2.6)
SODIUM SERPL-SCNC: 141 MMOL/L (ref 135–145)
TIBC SERPL-MCNC: 119 UG/DL (ref 250–450)
UIBC SERPL-MCNC: 106 UG/DL (ref 110–370)
WBC # BLD AUTO: 15.6 K/UL (ref 4.8–10.8)

## 2023-11-08 PROCEDURE — 85007 BL SMEAR W/DIFF WBC COUNT: CPT

## 2023-11-08 PROCEDURE — 83550 IRON BINDING TEST: CPT

## 2023-11-08 PROCEDURE — 85046 RETICYTE/HGB CONCENTRATE: CPT

## 2023-11-08 PROCEDURE — 97163 PT EVAL HIGH COMPLEX 45 MIN: CPT

## 2023-11-08 PROCEDURE — 99024 POSTOP FOLLOW-UP VISIT: CPT | Performed by: SURGERY

## 2023-11-08 PROCEDURE — 80053 COMPREHEN METABOLIC PANEL: CPT

## 2023-11-08 PROCEDURE — 770022 HCHG ROOM/CARE - ICU (200)

## 2023-11-08 PROCEDURE — 83540 ASSAY OF IRON: CPT

## 2023-11-08 PROCEDURE — 82962 GLUCOSE BLOOD TEST: CPT | Mod: 91

## 2023-11-08 PROCEDURE — 700105 HCHG RX REV CODE 258: Performed by: SURGERY

## 2023-11-08 PROCEDURE — 97535 SELF CARE MNGMENT TRAINING: CPT

## 2023-11-08 PROCEDURE — 85027 COMPLETE CBC AUTOMATED: CPT

## 2023-11-08 PROCEDURE — 99232 SBSQ HOSP IP/OBS MODERATE 35: CPT | Performed by: SURGERY

## 2023-11-08 PROCEDURE — 700111 HCHG RX REV CODE 636 W/ 250 OVERRIDE (IP): Mod: JZ | Performed by: SURGERY

## 2023-11-08 PROCEDURE — 97602 WOUND(S) CARE NON-SELECTIVE: CPT

## 2023-11-08 PROCEDURE — 97166 OT EVAL MOD COMPLEX 45 MIN: CPT

## 2023-11-08 PROCEDURE — 71045 X-RAY EXAM CHEST 1 VIEW: CPT

## 2023-11-08 RX ORDER — ACETAMINOPHEN 325 MG/1
650 TABLET ORAL EVERY 6 HOURS
Status: DISPENSED | OUTPATIENT
Start: 2023-11-09 | End: 2023-11-13

## 2023-11-08 RX ORDER — HYDROMORPHONE HYDROCHLORIDE 1 MG/ML
0.25 INJECTION, SOLUTION INTRAMUSCULAR; INTRAVENOUS; SUBCUTANEOUS
Status: DISCONTINUED | OUTPATIENT
Start: 2023-11-08 | End: 2023-11-12

## 2023-11-08 RX ORDER — ENOXAPARIN SODIUM 100 MG/ML
40 INJECTION SUBCUTANEOUS DAILY
Status: DISCONTINUED | OUTPATIENT
Start: 2023-11-08 | End: 2023-11-21 | Stop reason: HOSPADM

## 2023-11-08 RX ORDER — ACETAMINOPHEN 325 MG/1
650 TABLET ORAL EVERY 6 HOURS
Status: DISCONTINUED | OUTPATIENT
Start: 2023-11-08 | End: 2023-11-08

## 2023-11-08 RX ORDER — HALOPERIDOL 5 MG/ML
5 INJECTION INTRAMUSCULAR
Status: DISCONTINUED | OUTPATIENT
Start: 2023-11-08 | End: 2023-11-09

## 2023-11-08 RX ORDER — OXYCODONE HYDROCHLORIDE 5 MG/1
2.5 TABLET ORAL
Status: DISCONTINUED | OUTPATIENT
Start: 2023-11-08 | End: 2023-11-17

## 2023-11-08 RX ORDER — ACETAMINOPHEN 325 MG/1
650 TABLET ORAL EVERY 6 HOURS PRN
Status: DISCONTINUED | OUTPATIENT
Start: 2023-11-13 | End: 2023-11-08

## 2023-11-08 RX ORDER — ACETAMINOPHEN 325 MG/1
650 TABLET ORAL EVERY 6 HOURS PRN
Status: DISPENSED | OUTPATIENT
Start: 2023-11-13 | End: 2023-11-18

## 2023-11-08 RX ORDER — HYDROMORPHONE HYDROCHLORIDE 1 MG/ML
0.5 INJECTION, SOLUTION INTRAMUSCULAR; INTRAVENOUS; SUBCUTANEOUS
Status: DISCONTINUED | OUTPATIENT
Start: 2023-11-08 | End: 2023-11-09

## 2023-11-08 RX ORDER — OXYCODONE HYDROCHLORIDE 5 MG/1
5 TABLET ORAL
Status: DISCONTINUED | OUTPATIENT
Start: 2023-11-08 | End: 2023-11-17

## 2023-11-08 RX ORDER — HALOPERIDOL 5 MG/ML
5 INJECTION INTRAMUSCULAR ONCE
Status: DISCONTINUED | OUTPATIENT
Start: 2023-11-08 | End: 2023-11-08

## 2023-11-08 RX ADMIN — SODIUM CHLORIDE 250 MG: 9 INJECTION, SOLUTION INTRAVENOUS at 10:14

## 2023-11-08 RX ADMIN — FAMOTIDINE 20 MG: 10 INJECTION, SOLUTION INTRAVENOUS at 18:41

## 2023-11-08 RX ADMIN — FAMOTIDINE 20 MG: 10 INJECTION, SOLUTION INTRAVENOUS at 05:05

## 2023-11-08 RX ADMIN — ENOXAPARIN SODIUM 40 MG: 100 INJECTION SUBCUTANEOUS at 10:16

## 2023-11-08 RX ADMIN — PIPERACILLIN AND TAZOBACTAM 4.5 G: 4; .5 INJECTION, POWDER, FOR SOLUTION INTRAVENOUS at 22:22

## 2023-11-08 RX ADMIN — PIPERACILLIN AND TAZOBACTAM 4.5 G: 4; .5 INJECTION, POWDER, FOR SOLUTION INTRAVENOUS at 14:13

## 2023-11-08 RX ADMIN — SODIUM CHLORIDE 250 MG: 9 INJECTION, SOLUTION INTRAVENOUS at 18:38

## 2023-11-08 RX ADMIN — PIPERACILLIN AND TAZOBACTAM 4.5 G: 4; .5 INJECTION, POWDER, FOR SOLUTION INTRAVENOUS at 05:10

## 2023-11-08 ASSESSMENT — GAIT ASSESSMENTS
ASSISTIVE DEVICE: HAND HELD ASSIST
GAIT LEVEL OF ASSIST: MODERATE ASSIST
DEVIATION: SHUFFLED GAIT;BRADYKINETIC
DISTANCE (FEET): 2

## 2023-11-08 ASSESSMENT — COGNITIVE AND FUNCTIONAL STATUS - GENERAL
DRESSING REGULAR UPPER BODY CLOTHING: A LOT
SUGGESTED CMS G CODE MODIFIER DAILY ACTIVITY: CL
TURNING FROM BACK TO SIDE WHILE IN FLAT BAD: UNABLE
MOVING TO AND FROM BED TO CHAIR: UNABLE
WALKING IN HOSPITAL ROOM: A LOT
MOBILITY SCORE: 9
SUGGESTED CMS G CODE MODIFIER MOBILITY: CM
TOILETING: TOTAL
CLIMB 3 TO 5 STEPS WITH RAILING: A LOT
STANDING UP FROM CHAIR USING ARMS: A LOT
MOVING FROM LYING ON BACK TO SITTING ON SIDE OF FLAT BED: UNABLE
PERSONAL GROOMING: A LOT
DAILY ACTIVITIY SCORE: 9
DRESSING REGULAR LOWER BODY CLOTHING: TOTAL
EATING MEALS: A LOT
HELP NEEDED FOR BATHING: TOTAL

## 2023-11-08 ASSESSMENT — FIBROSIS 4 INDEX: FIB4 SCORE: 2.87

## 2023-11-08 NOTE — WOUND TEAM
[facility]  Inpatient Services  Wound and Skin Care Brief Evaluation    Admission Date: 11/5/2023     Last order of IP CONSULT TO WOUND CARE was found on 11/7/2023 from Hospital Encounter on 11/5/2023     HPI, PMH, SH: Reviewed    No chief complaint on file.    Diagnosis: Small bowel obstruction (HCC) [K56.609]    Unit where seen by Wound Team: T909/01     Wound consult placed regarding sacrum. Chart and images reviewed. This discussed with bedside RN. This clinician in to assess patient. Sacrum with mild redness, all areas blanching and patient with no open wounds.     No pressure injuries or advanced wound care needs identified. Wound consult completed. No further follow up unless indicated and consulted.     ** Pt with abdominal wound vac. Went to OR with Dr. York on 11/5 and again with Dr. Locke on 11/7. Voalte messaged Dr. York, received permission to change patient's wound vac starting Thurs 11/9. **            PREVENTATIVE INTERVENTIONS:    Q shift Juan Antonio - performed per nursing policy  Q shift pressure point assessments - performed per nursing policy    Surface/Positioning  ICU Low Airloss - Currently in Place  Reposition q 2 hours - Currently in Place  TAPs Turning system - Currently in Place    Offloading/Redistribution  Sacral offloading dressing (Silicone dressing) - Currently in Place

## 2023-11-08 NOTE — PROGRESS NOTES
"  DATE: 11/8/2023    Post Operative Day 3  Exploratory laparotomy, segmental small bowel resection for internal hernia with necrotic bowel, and damage control closure.   Post Operative Day 1  Second look laparotomy, ileocolic resection, delayed primary fascial closure, and VAC dressing.     PHYSICAL EXAMINATION:  Vital Signs: BP (!) 142/65   Pulse 85   Temp 36.1 °C (97 °F) (Temporal)   Resp 20   Ht 1.626 m (5' 4\")   Wt 69.4 kg (153 lb)   SpO2 95%     Resting comfortably.  Clear.  Regular rate and rhythm.  Abdomen is soft, flat, and mildly tender.  VAC dressing.    LABORATORY VALUES:  Recent Labs     11/06/23  0450 11/07/23  0435 11/08/23  0410   WBC 18.6* 12.8* 15.6*   RBC 3.17* 2.58* 2.54*   HEMOGLOBIN 10.1* 8.2* 7.9*   HEMATOCRIT 31.3* 24.5* 24.1*   MCV 98.7* 95.0 94.9   MCH 31.9 31.8 31.1   MCHC 32.3 33.5 32.8   RDW 50.9* 48.4 49.0   PLATELETCT 249 194 213   MPV 9.9 10.0 9.6       ASSESSMENT AND PLAN:  Continued progress  Advance diet.  Mobilize aggressively.       ____________________________________     Tobi Locke M.D.    DD: 11/8/2023  7:44 AM    "

## 2023-11-08 NOTE — PROGRESS NOTES
Trauma / Surgical Daily Progress Note    Date of Service  11/8/2023    Chief Complaint  78 y.o. female admitted 11/5/2023 with Small bowel obstruction    Interval Events    POD 1 SB resection and closure / day 2 exploration and SB resection  GCS 13 confused and lethargic  Room air  AB:  Zosyn total 7, 4 days from closure.   Lovenox:  begin this am  UO :  reduce IVF to 100  Ambulate.   Review of Systems  Review of Systems     Vital Signs for last 24 hours  Temp:  [36.1 °C (97 °F)-37.3 °C (99.1 °F)] 36.1 °C (97 °F)  Pulse:  [80-97] 83  Resp:  [14-29] 14  BP: (121-174)/() 149/65  SpO2:  [93 %-100 %] 95 %    Hemodynamic parameters for last 24 hours       Respiratory Data     Respiration: 14, Pulse Oximetry: 95 %        RUL Breath Sounds: Clear, RML Breath Sounds: Diminished, RLL Breath Sounds: Diminished, ESTHER Breath Sounds: Clear, LLL Breath Sounds: Diminished    Physical Exam  Physical Exam  Cardiovascular:      Rate and Rhythm: Regular rhythm.   Pulmonary:      Effort: No respiratory distress.   Abdominal:      Palpations: Abdomen is soft.   Skin:     General: Skin is warm.   Neurological:      General: No focal deficit present.      Mental Status: She is alert.         Laboratory  Recent Results (from the past 24 hour(s))   POCT glucose device results    Collection Time: 11/07/23  1:44 PM   Result Value Ref Range    POC Glucose, Blood 94 65 - 99 mg/dL   POCT glucose device results    Collection Time: 11/07/23  5:14 PM   Result Value Ref Range    POC Glucose, Blood 104 (H) 65 - 99 mg/dL   POCT glucose device results    Collection Time: 11/07/23 11:57 PM   Result Value Ref Range    POC Glucose, Blood 106 (H) 65 - 99 mg/dL   CBC with Differential: Tomorrow AM    Collection Time: 11/08/23  4:10 AM   Result Value Ref Range    WBC 15.6 (H) 4.8 - 10.8 K/uL    RBC 2.54 (L) 4.20 - 5.40 M/uL    Hemoglobin 7.9 (L) 12.0 - 16.0 g/dL    Hematocrit 24.1 (L) 37.0 - 47.0 %    MCV 94.9 81.4 - 97.8 fL    MCH 31.1 27.0 - 33.0 pg     MCHC 32.8 32.2 - 35.5 g/dL    RDW 49.0 35.9 - 50.0 fL    Platelet Count 213 164 - 446 K/uL    MPV 9.6 9.0 - 12.9 fL    Neutrophils-Polys 93.20 (H) 44.00 - 72.00 %    Lymphocytes 2.60 (L) 22.00 - 41.00 %    Monocytes 1.70 0.00 - 13.40 %    Eosinophils 0.00 0.00 - 6.90 %    Basophils 0.00 0.00 - 1.80 %    Nucleated RBC 0.00 0.00 - 0.20 /100 WBC    Neutrophils (Absolute) 14.80 (H) 1.82 - 7.42 K/uL    Lymphs (Absolute) 0.41 (L) 1.00 - 4.80 K/uL    Monos (Absolute) 0.27 0.00 - 0.85 K/uL    Eos (Absolute) 0.00 0.00 - 0.51 K/uL    Baso (Absolute) 0.00 0.00 - 0.12 K/uL    NRBC (Absolute) 0.00 K/uL   Comp Metabolic Panel (CMP): Tomorrow AM    Collection Time: 11/08/23  4:10 AM   Result Value Ref Range    Sodium 141 135 - 145 mmol/L    Potassium 3.7 3.6 - 5.5 mmol/L    Chloride 108 96 - 112 mmol/L    Co2 21 20 - 33 mmol/L    Anion Gap 12.0 7.0 - 16.0    Glucose 111 (H) 65 - 99 mg/dL    Bun 27 (H) 8 - 22 mg/dL    Creatinine 0.92 0.50 - 1.40 mg/dL    Calcium 8.0 (L) 8.5 - 10.5 mg/dL    Correct Calcium 9.7 8.5 - 10.5 mg/dL    AST(SGOT) 22 12 - 45 U/L    ALT(SGPT) 14 2 - 50 U/L    Alkaline Phosphatase 60 30 - 99 U/L    Total Bilirubin 0.2 0.1 - 1.5 mg/dL    Albumin 1.9 (L) 3.2 - 4.9 g/dL    Total Protein 4.5 (L) 6.0 - 8.2 g/dL    Globulin 2.6 1.9 - 3.5 g/dL    A-G Ratio 0.7 g/dL   IRON/TOTAL IRON BIND    Collection Time: 11/08/23  4:10 AM   Result Value Ref Range    Iron 13 (L) 40 - 170 ug/dL    Total Iron Binding 119 (L) 250 - 450 ug/dL    Unsat Iron Binding 106 (L) 110 - 370 ug/dL    % Saturation 11 (L) 15 - 55 %   RETICULOCYTES COUNT    Collection Time: 11/08/23  4:10 AM   Result Value Ref Range    Reticulocyte Count 2.0 0.8 - 2.6 %    Retic, Absolute 0.05 0.04 - 0.12 M/uL    Imm. Reticulocyte Fraction 20.3 (H) 2.6 - 16.1 %    Retic Hgb Equivalent 25.1 (L) 29.0 - 35.0 pg/cell   ESTIMATED GFR    Collection Time: 11/08/23  4:10 AM   Result Value Ref Range    GFR (CKD-EPI) 63 >60 mL/min/1.73 m 2   DIFFERENTIAL MANUAL     Collection Time: 11/08/23  4:10 AM   Result Value Ref Range    Bands-Stabs 1.70 0.00 - 10.00 %    Metamyelocytes 0.80 %    Manual Diff Status PERFORMED    PERIPHERAL SMEAR REVIEW    Collection Time: 11/08/23  4:10 AM   Result Value Ref Range    Peripheral Smear Review see below    PLATELET ESTIMATE    Collection Time: 11/08/23  4:10 AM   Result Value Ref Range    Plt Estimation Normal    MORPHOLOGY    Collection Time: 11/08/23  4:10 AM   Result Value Ref Range    RBC Morphology Normal    POCT glucose device results    Collection Time: 11/08/23  5:12 AM   Result Value Ref Range    POC Glucose, Blood 105 (H) 65 - 99 mg/dL   POCT glucose device results    Collection Time: 11/08/23 12:50 PM   Result Value Ref Range    POC Glucose, Blood 96 65 - 99 mg/dL       Fluids    Intake/Output Summary (Last 24 hours) at 11/8/2023 1304  Last data filed at 11/8/2023 1200  Gross per 24 hour   Intake 3222.01 ml   Output 1345 ml   Net 1877.01 ml       Core Measures & Quality Metrics  Labs reviewed, Medications reviewed and Radiology images reviewed  Arshad catheter: Critically Ill - Requiring Accurate Measurement of Urinary Output      DVT Prophylaxis: Enoxaparin (Lovenox)  DVT prophylaxis - mechanical: SCDs    Antibiotics: Treating active infection/contamination beyond 24 hours perioperative coverage      RAP Score Total: 0    ETOH Screening    Assessment/Plan  * Small bowel obstruction (HCC)- (present on admission)  Assessment & Plan  11/5 Emergent Exploratory laparotomy with bowel resection.   Left in discontinuity / ABThera  11/7 Additional small bowel resected at ileum, and proximal anastomosis, fascial closure.    11/7 Zosyn day 3 of 5 .    Sepsis (HCC)- (present on admission)  Assessment & Plan   Septic shock Present on admission  SIRS criteria identified on my evaluation include: Tachycardia, with heart rate greater than 90 BPM and Tachypnea, with respirations greater than 20 per minute  Clinical indicators of end organ  dysfunction include Hypotension with systolic blood pressure less than 90 or MAP less than 65   Sources : necrotic bowel  Sepsis protocol initiated  Crystalloid Fluid Administration: Fluid resuscitation ordered per standard protocol - 30 mL/kg per current or ideal body weight  IV antibiotics as appropriate for source of sepsis  End points assessed.   IV pressor support initiated in OR    Acute blood loss anemia  Assessment & Plan  Transfuse 1 unit PRBC if Hb < 7.0  Check iron studies and replace as indicated.    No contraindication to deep vein thrombosis (DVT) prophylaxis- (present on admission)  Assessment & Plan  11/7 begin Lovenox post op    Respiratory failure following trauma and surgery (HCC)- (present on admission)  Assessment & Plan  Left intubated post op  Continue full mechanical ventilatory support. Ventilator bundle and Trauma weaning protocol.  11/7 Extubated post op    Elevated serum creatinine- (present on admission)  Assessment & Plan  IV hydration and trend.    HLD (hyperlipidemia)- (present on admission)  Assessment & Plan  Chronic condition treated with Zocor.  Holding maintenance medication during acute septic illness.    Bilateral leg edema- (present on admission)  Assessment & Plan  Chronic condition     Hypothyroid- (present on admission)  Assessment & Plan  Chronic condition treated with Synthroid.  Holding maintenance medication during acute septic illness.        Discussed patient condition with RN, RT, Pharmacy, and Dietary.  CRITICAL CARE TIME EXCLUDING PROCEDURES: 35  minutes

## 2023-11-08 NOTE — CARE PLAN
Problem: Knowledge Deficit - Standard  Goal: Patient and family/care givers will demonstrate understanding of plan of care, disease process/condition, diagnostic tests and medications  Outcome: Progressing     Problem: Urinary - Renal Perfusion  Goal: Ability to achieve and maintain adequate renal perfusion and functioning will improve  Outcome: Progressing     Problem: Respiratory  Goal: Patient will achieve/maintain optimum respiratory ventilation and gas exchange  Outcome: Progressing     Problem: Pain - Standard  Goal: Alleviation of pain or a reduction in pain to the patient’s comfort goal  Outcome: Progressing     The patient is Watcher - Medium risk of patient condition declining or worsening    Shift Goals  Clinical Goals: rest, pain control  Patient Goals: AMELIA  Family Goals: AMELIA    Progress made toward(s) clinical / shift goals:  wean o2, mobilizing, labs stable    Patient is not progressing towards the following goals:

## 2023-11-08 NOTE — THERAPY
"Occupational Therapy   Initial Evaluation     Patient Name: Rubi Haq  Age:  78 y.o., Sex:  female  Medical Record #: [id number]  Today's Date: 11/8/2023     Precautions: Fall Risk, Other (See Comments)  Comments: abdominal prec, wound vac, confused    Assessment  Patient is 78 y.o. female admitted to outside hospital for n/v, found to have SBO and txf here for further intervention. PMHx: is limited chart indicates hx of hypothyroid, endometrial CA s/p hysterectomy and chemoradiation, HLD, and per family/chart AOx3.   This admission pt is dx w/SBO s/p emergent ex-lap w/bowel resection and required 2ndary resection of ileum and fasical closure, sepsis, acute anemia, respiratory failure following sx now extubated, and BLE edema.   Today pt presents confused and was nearly non-verbal (behavioral) occasionally responding no, but wouldn't consistently open her eyes or engage with therapist. Additionally, pt appears deconditioned, w/edema and limited activity tolerance impacting ADL's. Need to confirm PLOF.     Plan  Occupational Therapy Initial Treatment Plan   Treatment Interventions: Self Care / Activities of Daily Living, Adaptive Equipment, Cognitive Skill Development, Neuro Re-Education / Balance, Manual Therapy Techniques, Therapeutic Exercises, Therapeutic Activity  Treatment Frequency: 4 Times per Week  Duration: Until Therapy Goals Met    DC Equipment Recommendations: Unable to determine at this time  Discharge Recommendations: Recommend post-acute placement for additional occupational therapy services prior to discharge home     Subjective  \"No ouch\"      Objective   11/08/23 1029   Charge Group   OT Evaluation OT Evaluation Mod   Initial Contact Note    Initial Contact Note Order Received and Verified, Occupational Therapy Evaluation in Progress with Full Report to Follow.   Prior Living Situation   Prior Services Unable To Determine At This Time   Comments Pt is unable to give hx, chart indicates " residing in [place] need to confirm environmental set up and level of assist available, chart indicates pt does have family support   Prior Level of ADL Function   Comments Unable To Determine At This Time   Prior Level of IADL Function   Comments Unable To Determine At This Time   Precautions   Precautions Fall Risk;Other (See Comments)   Comments abdominal prec, wound vac, confused   Cognition    Cognition / Consciousness X   Speech/ Communication Delayed Responses   Orientation Level Not Oriented to Reason;Not Oriented to Place;Not Oriented to Time   Level of Consciousness Responds to pain   Ability To Follow Commands 1 Step   Safety Awareness Impaired;Impulsive   New Learning Impaired   Attention Impaired   Initiation Impaired   Comments Initially did not provide any verbal responses, kept eyes closed (initially were open) and only responded after sternal rub and generally just yes/no responses   Passive ROM Upper Body   Passive ROM Upper Body WDL   Active ROM Upper Body   Active ROM Upper Body  X   Comments limited by participation and weakness   Strength Upper Body   Upper Body Strength  X   Gross Strength Generalized Weakness, Equal Bilaterally.    Coordination Upper Body   Coordination X   Comments unable to fully assess d/t confusion/bx   Balance Assessment   Sitting Balance (Static) Fair -   Sitting Balance (Dynamic) Fair -   Standing Balance (Static) Fair -   Standing Balance (Dynamic) Poor +   Weight Shift Sitting Fair   Weight Shift Standing Poor   Comments HHA   Bed Mobility    Supine to Sit Total Assist   ADL Assessment   Grooming Maximal Assist;Seated   Upper Body Dressing Maximal Assist   Lower Body Dressing Total Assist   Toileting Total Assist   Comments incontinent of bowel in bed   How much help from another person does the patient currently need...   6 Clicks Daily Activity Score 9   Functional Mobility   Sit to Stand Moderate Assist   Bed, Chair, Wheelchair Transfer Moderate Assist   Mobility  EOB>chair   ICU Target Mobility Level   ICU Mobility - Targeted Level Level 3B   Edema / Skin Assessment   Edema / Skin  X   Comments general edema in all extremities   Activity Tolerance   Comments poor   Patient / Family Goals   Patient / Family Goal #1 none stated   Short Term Goals   Short Term Goal # 1 pt will complete UB dressing w/spv   Short Term Goal # 2 pt will complete txf to BSC w/min A   Short Term Goal # 3 pt will complete LB dressing w/min A   Education Group   Role of Occupational Therapist Patient Response Patient;No Learning Evidence   Occupational Therapy Initial Treatment Plan    Treatment Interventions Self Care / Activities of Daily Living;Adaptive Equipment;Cognitive Skill Development;Neuro Re-Education / Balance;Manual Therapy Techniques;Therapeutic Exercises;Therapeutic Activity   Treatment Frequency 4 Times per Week   Duration Until Therapy Goals Met   Problem List   Problem List Decreased Active Daily Living Skills;Decreased Upper Extremity Strength Right;Decreased Upper Extremity Strength Left;Decreased Functional Mobility;Decreased Activity Tolerance;Safety Awareness Deficits / Cognition;Impaired Postural Control / Balance;Impaired Posture / Trunk Alignment;Impaired Cognitive Function;Limited Knowledge of Post Op Precautions   Anticipated Discharge Equipment and Recommendations   DC Equipment Recommendations Unable to determine at this time   Discharge Recommendations Recommend post-acute placement for additional occupational therapy services prior to discharge home   Interdisciplinary Plan of Care Collaboration   IDT Collaboration with  Nursing;Physical Therapist   Patient Position at End of Therapy Call Light within Reach;Tray Table within Reach;Phone within Reach;Seated;Chair Alarm On   Collaboration Comments RN aware of OT eval and pts efforts   Session Information   Date / Session Number  11/8 #1 (1/4, 11/12)

## 2023-11-08 NOTE — THERAPY
"Physical Therapy   Initial Evaluation     Patient Name: Rubi Haq  Age:  78 y.o., Sex:  female  Medical Record #: [id number]  Today's Date: 11/8/2023     Precautions  Precautions: Fall Risk;Other (See Comments)  Comments: abdominal prec, wound vac, confused    Assessment   78 y.o. female admitted 11/5/2023 with Small bowel obstruction. To OR for ex lap, internal hernia with >60cm dead bowel Resected, Post Operative Day 3  Exploratory laparotomy, segmental small bowel resection for internal hernia with necrotic bowel, and damage control closure. Post Operative Day 1  Second look laparotomy, ileocolic resection, delayed primary fascial closure, and VAC dressing. Patient [facility] to [facility] with impaired balance, cognition, strength and mobility.  She was minimally engaging and lethargic throughout.  Unable to obtain PLOF but appears to have sufficient strength for mobility. At this time patient will benefit from placement for further therapy at this time. Will continue to follow.     Plan    Physical Therapy Initial Treatment Plan   Treatment Plan : Bed Mobility, Gait Training, Equipment, Neuro Re-Education / Balance, Stair Training, Therapeutic Activities, Therapeutic Exercise  Treatment Frequency: 4 Times per Week  Duration: Until Therapy Goals Met    DC Equipment Recommendations: Unable to determine at this time  Discharge Recommendations: Recommend post-acute placement for additional physical therapy services prior to discharge home       Subjective    \"No, no\"     Objective       11/08/23 1000   Precautions   Precautions Fall Risk   Comments abdominal precautions   Vitals   Pulse 87   Blood Pressure  121/59   Respiration 18   Pulse Oximetry 96 %   Pain 0 - 10 Group   Location [place]   Therapist Pain Assessment During Activity;Nurse Notified   Prior Living Situation   Prior Services Unable To Determine At This Time   Comments Patient is not able to provide history. Per chart she lvies in [place] and has a " son. She reports she lives alone but will need to confirm home set up   Prior Level of Functional Mobility   Comments will need to clarify once patient more alert and answering questions   Cognition    Cognition / Consciousness X   Speech/ Communication Delayed Responses   Orientation Level Not Oriented to Reason;Not Oriented to Place;Not Oriented to Time   Level of Consciousness Responds to pain   Ability To Follow Commands 1 Step   Safety Awareness Impaired;Impulsive   New Learning Impaired   Attention Impaired   Initiation Impaired   Comments intermittently responding to yes/no questions   Active ROM Upper Body   Active ROM Upper Body  WDL   Strength Upper Body   Upper Body Strength  X   Gross Strength Generalized Weakness, Equal Bilaterally.    Active ROM Lower Body    Active ROM Lower Body  WDL   Strength Lower Body   Lower Body Strength  X   Gross Strength Generalized Weakness, Equal Bilaterally   Vision   Vision Comments needs cueing to keep eyes opne, often squinting, unclear if she wears glasses   Other Treatments   Other Treatments Provided Patient found sitting in a BM, extensive constanza-care provided   Balance Assessment   Sitting Balance (Static) Fair -   Sitting Balance (Dynamic) Fair -   Standing Balance (Static) Fair -   Standing Balance (Dynamic) Poor +   Weight Shift Sitting Fair   Weight Shift Standing Poor   Comments HHA   Bed Mobility    Supine to Sit Total Assist   Scooting Maximal Assist   Gait Analysis   Gait Level Of Assist Moderate Assist   Assistive Device Hand Held Assist   Distance (Feet) 2   # of Times Distance was Traveled 1   Deviation Shuffled Gait;Bradykinetic   Functional Mobility   Sit to Stand Moderate Assist   Bed, Chair, Wheelchair Transfer Moderate Assist   Transfer Method Stand Step   ICU Target Mobility Level   ICU Mobility - Targeted Level Level 3B   How much difficulty does the patient currently have...   Turning over in bed (including adjusting bedclothes, sheets and  blankets)? 1   Sitting down on and standing up from a chair with arms (e.g., wheelchair, bedside commode, etc.) 1   Moving from lying on back to sitting on the side of the bed? 1   How much help from another person does the patient currently need...   Moving to and from a bed to a chair (including a wheelchair)? 2   Need to walk in a hospital room? 2   Climbing 3-5 steps with a railing? 2   6 clicks Mobility Score 9   Activity Tolerance   Sitting in Chair post session   Sitting Edge of Bed 10 min   Standing 2 min   Edema / Skin Assessment   Edema / Skin  X   Comments global edema   Short Term Goals    Short Term Goal # 1 in 6 visits patient will demo all functional transfers with sup and LRAD for safe DC   Short Term Goal # 2 in 6 visits patient will ambulate 100' w/FWW and Rossy for safe DC   Short Term Goal # 3 in 6 visits patient will demo all bed mobility indep for safe DC   Education Group   Education Provided Role of Physical Therapist   Role of Physical Therapist Patient Response Patient;Acceptance;Explanation;Demonstration;Verbal Demonstration;Action Demonstration   Physical Therapy Initial Treatment Plan    Treatment Plan  Bed Mobility;Gait Training;Equipment;Neuro Re-Education / Balance;Stair Training;Therapeutic Activities;Therapeutic Exercise   Treatment Frequency 4 Times per Week   Duration Until Therapy Goals Met   Problem List    Problems Pain;Impaired Bed Mobility;Impaired Transfers;Impaired Ambulation;Functional Strength Deficit;Impaired Balance;Decreased Activity Tolerance;Safety Awareness Deficits / Cognition   Anticipated Discharge Equipment and Recommendations   DC Equipment Recommendations Unable to determine at this time   Discharge Recommendations Recommend post-acute placement for additional physical therapy services prior to discharge home     Jenny Quintanilla, PT, DPT, GCS

## 2023-11-08 NOTE — ASSESSMENT & PLAN NOTE
On oral iron supplementation outpatient.  11/8 Iron studies low, replacement ordered per pharmacy.  11/11 Hemglobin 6.9, 1 unit packed red cells transfused. Outpatient ferrous sulfate resumed.  Trend hemograms and transfuse packed red cells for hemoglobin less than 7.0

## 2023-11-08 NOTE — CARE PLAN
The patient is Stable - Low risk of patient condition declining or worsening    Shift Goals  Clinical Goals: pain control, hemodynamic stability  Patient Goals: AMELIA  Family Goals: AMELIA      Problem: Hemodynamics  Goal: Patient's hemodynamics, fluid balance and neurologic status will be stable or improve  Outcome: Progressing     Problem: Fluid Volume  Goal: Fluid volume balance will be maintained  Outcome: Progressing     Problem: Skin Integrity  Goal: Skin integrity is maintained or improved  Outcome: Progressing

## 2023-11-08 NOTE — DIETARY
Nutrition Services: Update   Day 3 of admit.  Rubi Haq is a 78 y.o. female with admitting DX of Small bowel obstruction.    Pt is day #3 NPO. S/P small bowel resection. Expect diet advancement per Surgery.    Recommendations/Plan:  Diet advancement per MD.   RD following

## 2023-11-09 ENCOUNTER — APPOINTMENT (OUTPATIENT)
Dept: RADIOLOGY | Facility: MEDICAL CENTER | Age: 78
DRG: 853 | End: 2023-11-09
Attending: STUDENT IN AN ORGANIZED HEALTH CARE EDUCATION/TRAINING PROGRAM
Payer: MEDICARE

## 2023-11-09 PROBLEM — Z75.8 DISCHARGE PLANNING ISSUES: Status: ACTIVE | Noted: 2023-11-09

## 2023-11-09 PROBLEM — Z02.9 DISCHARGE PLANNING ISSUES: Status: ACTIVE | Noted: 2023-11-09

## 2023-11-09 LAB
ALBUMIN SERPL BCP-MCNC: 2 G/DL (ref 3.2–4.9)
ALBUMIN/GLOB SERPL: 0.9 G/DL
ALP SERPL-CCNC: 63 U/L (ref 30–99)
ALT SERPL-CCNC: 17 U/L (ref 2–50)
ANION GAP SERPL CALC-SCNC: 11 MMOL/L (ref 7–16)
AST SERPL-CCNC: 23 U/L (ref 12–45)
BASOPHILS # BLD AUTO: 0.2 % (ref 0–1.8)
BASOPHILS # BLD: 0.03 K/UL (ref 0–0.12)
BILIRUB SERPL-MCNC: <0.2 MG/DL (ref 0.1–1.5)
BUN SERPL-MCNC: 26 MG/DL (ref 8–22)
CALCIUM ALBUM COR SERPL-MCNC: 9.5 MG/DL (ref 8.5–10.5)
CALCIUM SERPL-MCNC: 7.9 MG/DL (ref 8.5–10.5)
CHLORIDE SERPL-SCNC: 111 MMOL/L (ref 96–112)
CO2 SERPL-SCNC: 21 MMOL/L (ref 20–33)
CREAT SERPL-MCNC: 0.81 MG/DL (ref 0.5–1.4)
EOSINOPHIL # BLD AUTO: 0.08 K/UL (ref 0–0.51)
EOSINOPHIL NFR BLD: 0.6 % (ref 0–6.9)
ERYTHROCYTE [DISTWIDTH] IN BLOOD BY AUTOMATED COUNT: 50.1 FL (ref 35.9–50)
GFR SERPLBLD CREATININE-BSD FMLA CKD-EPI: 73 ML/MIN/1.73 M 2
GLOBULIN SER CALC-MCNC: 2.2 G/DL (ref 1.9–3.5)
GLUCOSE BLD STRIP.AUTO-MCNC: 75 MG/DL (ref 65–99)
GLUCOSE BLD STRIP.AUTO-MCNC: 83 MG/DL (ref 65–99)
GLUCOSE SERPL-MCNC: 88 MG/DL (ref 65–99)
HCT VFR BLD AUTO: 22.1 % (ref 37–47)
HGB BLD-MCNC: 7.1 G/DL (ref 12–16)
IMM GRANULOCYTES # BLD AUTO: 0.1 K/UL (ref 0–0.11)
IMM GRANULOCYTES NFR BLD AUTO: 0.8 % (ref 0–0.9)
LYMPHOCYTES # BLD AUTO: 1.26 K/UL (ref 1–4.8)
LYMPHOCYTES NFR BLD: 9.5 % (ref 22–41)
MAGNESIUM SERPL-MCNC: 1.7 MG/DL (ref 1.5–2.5)
MCH RBC QN AUTO: 31 PG (ref 27–33)
MCHC RBC AUTO-ENTMCNC: 32.1 G/DL (ref 32.2–35.5)
MCV RBC AUTO: 96.5 FL (ref 81.4–97.8)
MONOCYTES # BLD AUTO: 0.97 K/UL (ref 0–0.85)
MONOCYTES NFR BLD AUTO: 7.3 % (ref 0–13.4)
NEUTROPHILS # BLD AUTO: 10.83 K/UL (ref 1.82–7.42)
NEUTROPHILS NFR BLD: 81.6 % (ref 44–72)
NRBC # BLD AUTO: 0 K/UL
NRBC BLD-RTO: 0 /100 WBC (ref 0–0.2)
PHOSPHATE SERPL-MCNC: 2.2 MG/DL (ref 2.5–4.5)
PLATELET # BLD AUTO: 201 K/UL (ref 164–446)
PMV BLD AUTO: 9.2 FL (ref 9–12.9)
POTASSIUM SERPL-SCNC: 3.2 MMOL/L (ref 3.6–5.5)
PROT SERPL-MCNC: 4.2 G/DL (ref 6–8.2)
RBC # BLD AUTO: 2.29 M/UL (ref 4.2–5.4)
SODIUM SERPL-SCNC: 143 MMOL/L (ref 135–145)
WBC # BLD AUTO: 13.3 K/UL (ref 4.8–10.8)

## 2023-11-09 PROCEDURE — 71045 X-RAY EXAM CHEST 1 VIEW: CPT

## 2023-11-09 PROCEDURE — 83735 ASSAY OF MAGNESIUM: CPT

## 2023-11-09 PROCEDURE — 97602 WOUND(S) CARE NON-SELECTIVE: CPT

## 2023-11-09 PROCEDURE — 700105 HCHG RX REV CODE 258: Performed by: SURGERY

## 2023-11-09 PROCEDURE — 770001 HCHG ROOM/CARE - MED/SURG/GYN PRIV*

## 2023-11-09 PROCEDURE — 700111 HCHG RX REV CODE 636 W/ 250 OVERRIDE (IP): Mod: JZ | Performed by: SURGERY

## 2023-11-09 PROCEDURE — 82962 GLUCOSE BLOOD TEST: CPT

## 2023-11-09 PROCEDURE — 80053 COMPREHEN METABOLIC PANEL: CPT

## 2023-11-09 PROCEDURE — A9270 NON-COVERED ITEM OR SERVICE: HCPCS | Performed by: SURGERY

## 2023-11-09 PROCEDURE — 97605 NEG PRS WND THER DME<=50SQCM: CPT

## 2023-11-09 PROCEDURE — 99232 SBSQ HOSP IP/OBS MODERATE 35: CPT | Performed by: SURGERY

## 2023-11-09 PROCEDURE — 51798 US URINE CAPACITY MEASURE: CPT

## 2023-11-09 PROCEDURE — 700102 HCHG RX REV CODE 250 W/ 637 OVERRIDE(OP): Performed by: SURGERY

## 2023-11-09 PROCEDURE — 700101 HCHG RX REV CODE 250: Performed by: SURGERY

## 2023-11-09 PROCEDURE — 85025 COMPLETE CBC W/AUTO DIFF WBC: CPT

## 2023-11-09 PROCEDURE — 84100 ASSAY OF PHOSPHORUS: CPT

## 2023-11-09 PROCEDURE — 99024 POSTOP FOLLOW-UP VISIT: CPT | Performed by: SURGERY

## 2023-11-09 RX ORDER — MAGNESIUM SULFATE HEPTAHYDRATE 40 MG/ML
2 INJECTION, SOLUTION INTRAVENOUS ONCE
Status: COMPLETED | OUTPATIENT
Start: 2023-11-09 | End: 2023-11-09

## 2023-11-09 RX ORDER — LIDOCAINE HYDROCHLORIDE 20 MG/ML
20 INJECTION, SOLUTION INFILTRATION; PERINEURAL
Status: DISCONTINUED | OUTPATIENT
Start: 2023-11-09 | End: 2023-11-21 | Stop reason: HOSPADM

## 2023-11-09 RX ORDER — LIDOCAINE HYDROCHLORIDE 40 MG/ML
SOLUTION TOPICAL
Status: DISCONTINUED | OUTPATIENT
Start: 2023-11-09 | End: 2023-11-21 | Stop reason: HOSPADM

## 2023-11-09 RX ORDER — LIDOCAINE HYDROCHLORIDE 20 MG/ML
1 JELLY TOPICAL
Status: DISCONTINUED | OUTPATIENT
Start: 2023-11-09 | End: 2023-11-21 | Stop reason: HOSPADM

## 2023-11-09 RX ORDER — POTASSIUM CHLORIDE 14.9 MG/ML
20 INJECTION INTRAVENOUS ONCE
Status: COMPLETED | OUTPATIENT
Start: 2023-11-09 | End: 2023-11-09

## 2023-11-09 RX ADMIN — LIDOCAINE HYDROCHLORIDE 1 APPLICATION: 40 SOLUTION TOPICAL at 11:25

## 2023-11-09 RX ADMIN — ENOXAPARIN SODIUM 40 MG: 100 INJECTION SUBCUTANEOUS at 08:50

## 2023-11-09 RX ADMIN — PIPERACILLIN AND TAZOBACTAM 4.5 G: 4; .5 INJECTION, POWDER, FOR SOLUTION INTRAVENOUS at 13:32

## 2023-11-09 RX ADMIN — SODIUM CHLORIDE 250 MG: 9 INJECTION, SOLUTION INTRAVENOUS at 06:30

## 2023-11-09 RX ADMIN — MAGNESIUM SULFATE HEPTAHYDRATE 2 G: 2 INJECTION, SOLUTION INTRAVENOUS at 09:03

## 2023-11-09 RX ADMIN — SODIUM CHLORIDE 250 MG: 9 INJECTION, SOLUTION INTRAVENOUS at 18:08

## 2023-11-09 RX ADMIN — POTASSIUM PHOSPHATE, MONOBASIC POTASSIUM PHOSPHATE, DIBASIC 15 MMOL: 224; 236 INJECTION, SOLUTION, CONCENTRATE INTRAVENOUS at 10:23

## 2023-11-09 RX ADMIN — PIPERACILLIN AND TAZOBACTAM 4.5 G: 4; .5 INJECTION, POWDER, FOR SOLUTION INTRAVENOUS at 22:17

## 2023-11-09 RX ADMIN — PIPERACILLIN AND TAZOBACTAM 4.5 G: 4; .5 INJECTION, POWDER, FOR SOLUTION INTRAVENOUS at 05:09

## 2023-11-09 RX ADMIN — POTASSIUM CHLORIDE 20 MEQ: 14.9 INJECTION, SOLUTION INTRAVENOUS at 08:51

## 2023-11-09 RX ADMIN — ACETAMINOPHEN 650 MG: 325 TABLET, FILM COATED ORAL at 11:53

## 2023-11-09 RX ADMIN — FAMOTIDINE 20 MG: 10 INJECTION, SOLUTION INTRAVENOUS at 17:33

## 2023-11-09 RX ADMIN — ACETAMINOPHEN 650 MG: 325 TABLET, FILM COATED ORAL at 17:33

## 2023-11-09 RX ADMIN — OXYCODONE 2.5 MG: 5 TABLET ORAL at 11:53

## 2023-11-09 RX ADMIN — FAMOTIDINE 20 MG: 10 INJECTION, SOLUTION INTRAVENOUS at 05:10

## 2023-11-09 NOTE — CARE PLAN
The patient is Stable - Low risk of patient condition declining or worsening    Shift Goals  Clinical Goals: comfort, replete electrolytes  Patient Goals: Rest  Family Goals: Updates    Progress made toward(s) clinical / shift goals:    Problem: Knowledge Deficit - Standard  Goal: Patient and family/care givers will demonstrate understanding of plan of care, disease process/condition, diagnostic tests and medications  Outcome: Progressing     Problem: Hemodynamics  Goal: Patient's hemodynamics, fluid balance and neurologic status will be stable or improve  Outcome: Progressing     Problem: Fluid Volume  Goal: Fluid volume balance will be maintained  Outcome: Progressing     Problem: Urinary - Renal Perfusion  Goal: Ability to achieve and maintain adequate renal perfusion and functioning will improve  Outcome: Progressing     Problem: Respiratory  Goal: Patient will achieve/maintain optimum respiratory ventilation and gas exchange  Outcome: Progressing     Problem: Mechanical Ventilation  Goal: Safe management of artificial airway and ventilation  Outcome: Progressing  Goal: Successful weaning off mechanical ventilator, spontaneously maintains adequate gas exchange  Outcome: Progressing  Goal: Patient will be able to express needs and understand communication  Outcome: Progressing     Problem: Physical Regulation  Goal: Diagnostic test results will improve  Outcome: Progressing  Goal: Signs and symptoms of infection will decrease  Outcome: Progressing     Problem: Pain - Standard  Goal: Alleviation of pain or a reduction in pain to the patient’s comfort goal  Outcome: Progressing     Problem: Skin Integrity  Goal: Skin integrity is maintained or improved  Outcome: Progressing     Problem: Fall Risk  Goal: Patient will remain free from falls  Outcome: Progressing     Problem: Safety - Medical Restraint  Goal: Remains free of injury from restraints (Restraint for Interference with Medical Device)  Outcome:  Progressing  Goal: Free from restraint(s) (Restraint for Interference with Medical Device)  Outcome: Progressing       Patient is not progressing towards the following goals:

## 2023-11-09 NOTE — PROGRESS NOTES
Trauma / Surgical Daily Progress Note    Date of Service  11/9/2023    Chief Complaint  78 y.o. female admitted 11/5/2023 with Small bowel obstruction    Interval Events  GCS 14  Chest clear  Sip clears  OOB  AB Zosyn.   Lovenox:  yes  Consider transfer.     Review of Systems  Review of Systems     Vital Signs for last 24 hours  Temp:  [36.3 °C (97.4 °F)-36.8 °C (98.2 °F)] 36.8 °C (98.2 °F)  Pulse:  [69-94] 78  Resp:  [12-31] 20  BP: (120-157)/(57-88) 127/60  SpO2:  [84 %-100 %] 92 %    Hemodynamic parameters for last 24 hours       Respiratory Data     Respiration: 20, Pulse Oximetry: 92 %     Work Of Breathing / Effort: Within Normal Limits  RUL Breath Sounds: Clear, RML Breath Sounds: Diminished, RLL Breath Sounds: Diminished, ESTHER Breath Sounds: Clear, LLL Breath Sounds: Diminished    Physical Exam  Physical Exam  Cardiovascular:      Rate and Rhythm: Regular rhythm.   Pulmonary:      Effort: No respiratory distress.   Abdominal:      Palpations: Abdomen is soft.   Skin:     General: Skin is warm.   Neurological:      General: No focal deficit present.      Mental Status: She is alert.         Laboratory  Recent Results (from the past 24 hour(s))   POCT glucose device results    Collection Time: 11/08/23 12:50 PM   Result Value Ref Range    POC Glucose, Blood 96 65 - 99 mg/dL   POCT glucose device results    Collection Time: 11/08/23  5:34 PM   Result Value Ref Range    POC Glucose, Blood 98 65 - 99 mg/dL   POCT glucose device results    Collection Time: 11/08/23 11:53 PM   Result Value Ref Range    POC Glucose, Blood 75 65 - 99 mg/dL   CBC with Differential: Tomorrow AM    Collection Time: 11/09/23  5:00 AM   Result Value Ref Range    WBC 13.3 (H) 4.8 - 10.8 K/uL    RBC 2.29 (L) 4.20 - 5.40 M/uL    Hemoglobin 7.1 (L) 12.0 - 16.0 g/dL    Hematocrit 22.1 (L) 37.0 - 47.0 %    MCV 96.5 81.4 - 97.8 fL    MCH 31.0 27.0 - 33.0 pg    MCHC 32.1 (L) 32.2 - 35.5 g/dL    RDW 50.1 (H) 35.9 - 50.0 fL    Platelet Count 201  164 - 446 K/uL    MPV 9.2 9.0 - 12.9 fL    Neutrophils-Polys 81.60 (H) 44.00 - 72.00 %    Lymphocytes 9.50 (L) 22.00 - 41.00 %    Monocytes 7.30 0.00 - 13.40 %    Eosinophils 0.60 0.00 - 6.90 %    Basophils 0.20 0.00 - 1.80 %    Immature Granulocytes 0.80 0.00 - 0.90 %    Nucleated RBC 0.00 0.00 - 0.20 /100 WBC    Neutrophils (Absolute) 10.83 (H) 1.82 - 7.42 K/uL    Lymphs (Absolute) 1.26 1.00 - 4.80 K/uL    Monos (Absolute) 0.97 (H) 0.00 - 0.85 K/uL    Eos (Absolute) 0.08 0.00 - 0.51 K/uL    Baso (Absolute) 0.03 0.00 - 0.12 K/uL    Immature Granulocytes (abs) 0.10 0.00 - 0.11 K/uL    NRBC (Absolute) 0.00 K/uL   Comp Metabolic Panel (CMP): Tomorrow AM    Collection Time: 11/09/23  5:00 AM   Result Value Ref Range    Sodium 143 135 - 145 mmol/L    Potassium 3.2 (L) 3.6 - 5.5 mmol/L    Chloride 111 96 - 112 mmol/L    Co2 21 20 - 33 mmol/L    Anion Gap 11.0 7.0 - 16.0    Glucose 88 65 - 99 mg/dL    Bun 26 (H) 8 - 22 mg/dL    Creatinine 0.81 0.50 - 1.40 mg/dL    Calcium 7.9 (L) 8.5 - 10.5 mg/dL    Correct Calcium 9.5 8.5 - 10.5 mg/dL    AST(SGOT) 23 12 - 45 U/L    ALT(SGPT) 17 2 - 50 U/L    Alkaline Phosphatase 63 30 - 99 U/L    Total Bilirubin <0.2 0.1 - 1.5 mg/dL    Albumin 2.0 (L) 3.2 - 4.9 g/dL    Total Protein 4.2 (L) 6.0 - 8.2 g/dL    Globulin 2.2 1.9 - 3.5 g/dL    A-G Ratio 0.9 g/dL   Magnesium: Every Monday and Thursday AM    Collection Time: 11/09/23  5:00 AM   Result Value Ref Range    Magnesium 1.7 1.5 - 2.5 mg/dL   Phosphorus: Every Monday and Thursday AM    Collection Time: 11/09/23  5:00 AM   Result Value Ref Range    Phosphorus 2.2 (L) 2.5 - 4.5 mg/dL   ESTIMATED GFR    Collection Time: 11/09/23  5:00 AM   Result Value Ref Range    GFR (CKD-EPI) 73 >60 mL/min/1.73 m 2   POCT glucose device results    Collection Time: 11/09/23  5:16 AM   Result Value Ref Range    POC Glucose, Blood 83 65 - 99 mg/dL       Fluids    Intake/Output Summary (Last 24 hours) at 11/9/2023 1041  Last data filed at 11/9/2023  1000  Gross per 24 hour   Intake 2987.68 ml   Output 1070 ml   Net 1917.68 ml       Core Measures & Quality Metrics  Labs reviewed, Medications reviewed and Radiology images reviewed  Arshad catheter: Critically Ill - Requiring Accurate Measurement of Urinary Output      DVT Prophylaxis: Enoxaparin (Lovenox)  DVT prophylaxis - mechanical: SCDs    Antibiotics: Treating active infection/contamination beyond 24 hours perioperative coverage      RAP Score Total: 0    ETOH Screening    Assessment/Plan  * Small bowel obstruction (HCC)- (present on admission)  Assessment & Plan  11/5 Emergent Exploratory laparotomy with bowel resection.   Left in discontinuity / ABThera  11/7 Additional small bowel resected at ileum, and proximal anastomosis, fascial closure.    11/8 Zosyn day 2 of 4 from source control and closure    Sepsis (HCC)- (present on admission)  Assessment & Plan   Septic shock Present on admission  SIRS criteria identified on my evaluation include: Tachycardia, with heart rate greater than 90 BPM and Tachypnea, with respirations greater than 20 per minute  Clinical indicators of end organ dysfunction include Hypotension with systolic blood pressure less than 90 or MAP less than 65   Sources : necrotic bowel  Sepsis protocol initiated  Crystalloid Fluid Administration: Fluid resuscitation ordered per standard protocol - 30 mL/kg per current or ideal body weight  IV antibiotics as appropriate for source of sepsis  End points assessed.   IV pressor support initiated in OR    Acute blood loss anemia  Assessment & Plan  Transfuse 1 unit PRBC if Hb < 7.0  Check iron studies and replace as indicated.    No contraindication to deep vein thrombosis (DVT) prophylaxis- (present on admission)  Assessment & Plan  11/7 begin Lovenox post op    Respiratory failure following trauma and surgery (HCC)- (present on admission)  Assessment & Plan  Left intubated post op  Continue full mechanical ventilatory support. Ventilator bundle  and Trauma weaning protocol.  11/7 Extubated post op    Elevated serum creatinine- (present on admission)  Assessment & Plan  IV hydration and trend.    HLD (hyperlipidemia)- (present on admission)  Assessment & Plan  Chronic condition treated with Zocor.  Holding maintenance medication during acute septic illness.    Bilateral leg edema- (present on admission)  Assessment & Plan  Chronic condition     Hypothyroid- (present on admission)  Assessment & Plan  Chronic condition treated with Synthroid.  Holding maintenance medication during acute septic illness.        Discussed patient condition with RN, RT, Pharmacy, and Dietary.  CRITICAL CARE TIME EXCLUDING PROCEDURES: 30   minutes

## 2023-11-09 NOTE — ASSESSMENT & PLAN NOTE
11/5 Acute/subacute occlusive thrombus in the left posterior tibial vein.    11/8 Pharmacological DVT prophylaxis, Lovenox 40 mg Q day initiated.   11/12 Trauma bilateral lower extremity duplex negative for DVT.

## 2023-11-09 NOTE — DOCUMENTATION QUERY
[facility]                                                                       Query Response Note      PATIENT:               MIGUEL HENDERSON  ACCT #:                  [id number]  MRN:                     [id number]  :                      1945  ADMIT DATE:       2023 12:18 PM  DISCH DATE:          RESPONDING  PROVIDER #:        [id number]           QUERY TEXT:    Respiratory failure following trauma and surgery is documented in the Medical Record.    Please further clarify the relationship between respiratory failure and surgery:    Documentation indicators that raised basis for question:   Progress Notes:  Respiratory failure following trauma and surgery POA: left intubated post op .  Extubated post op   Admit SpO2 99% on 4L   Op Report:  small bowel necrosis. hemodynamic instability.  Patient was taken to the surgical intensive care unit in critical condition on the ventilator.   Risk Factors: s/p ex lap  with hemodynamic instability, necrotic bowel, septic shock, encephalopathy   Treatment: supplemental O2, mechanical ventilation    Thank you,  Loida La RN, BSN, CCDS  Clinical   Connect via [employer]  Options provided:   -- Acute respiratory failure is not a complication due to or associated with the procedure and is due to general medical condition   -- Acute respiratory failure is unexpected and is a complication of the procedure performed   -- Other explanation, please specify   -- Unable to determine      Query created by: Loida La on 2023 9:50 AM    RESPONSE TEXT:    Acute respiratory failure is not a complication due to or associated with the procedure and is due to general medical condition          Electronically signed by:  JACOB LUNA MD 2023 1:00 PM

## 2023-11-09 NOTE — WOUND TEAM
[facility]  Inpatient Services  Initial Wound and Skin Care Evaluation    Admission Date: 11/5/2023     Last order of IP CONSULT TO WOUND CARE was found on 11/7/2023 from Hospital Encounter on 11/5/2023     HPI, PMH, SH: Reviewed    Past Surgical History:   Procedure Laterality Date    CO EXPLORATORY OF ABDOMEN N/A 11/7/2023    Procedure: LAPAROTOMY, EXPLORATORY 2ND LOOK;  Surgeon: Tobi Locke M.D.;  Location: SURGERY [facility];  Service: General    WOUND CLOSURE NEURO N/A 11/7/2023    Procedure: CLOSURE, WOUND;  Surgeon: Tobi Locke M.D.;  Location: SURGERY [facility];  Service: General    BOWEL RESECTION  11/7/2023    Procedure: EXCISION, INTESTINE;  Surgeon: Tobi Locke M.D.;  Location: SURGERY [facility];  Service: General    CO EXPLORATORY OF ABDOMEN  11/5/2023    Procedure: LAPAROTOMY, EXPLORATORY,  SMALL BOWEL RESECTION vac greater than 50cm, abthera placement;  Surgeon: Doroteo York D.O.;  Location: [facility];  Service: Gen Robotic    FUSION, SPINE, LUMBAR, PLIF  6/10/2019    Procedure: FUSION, SPINE, LUMBAR, TLIF L4-5;  Surgeon: Amado Marquez M.D.;  Location: [facility];  Service: Neurosurgery    LUMBAR LAMINECTOMY DISKECTOMY  6/10/2019    Procedure: LAMINECTOMY, SPINE, LUMBAR, WITH DISCECTOMY- REDO;  Surgeon: Amado Marquez M.D.;  Location: [facility];  Service: Neurosurgery    OTHER  2018    glaucoma surgery right and left eye    OTHER  2015    yag laser surgery right eye    OTHER  2014    mohs basal cell nasal    OTHER  2014    basal cell nasal    GYN SURGERY  2011    hysterectomy    OTHER ORTHOPEDIC SURGERY  2010    right thumb    OTHER ORTHOPEDIC SURGERY  2010    left thumb    OTHER  2010    right iol    OTHER ORTHOPEDIC SURGERY  2007    spine    OTHER ORTHOPEDIC SURGERY  2007    dural leak    OTHER ORTHOPEDIC SURGERY  2002    carpel tunnel    GYN SURGERY  1983    c sec     Social History     Tobacco Use    Smoking  status: Never    Smokeless tobacco: Never   Substance Use Topics    Alcohol use: Yes     Comment: 1 daily     No chief complaint on file.    Diagnosis: Small bowel obstruction (HCC) [K56.609]    Unit where seen by Wound Team: T909/01     WOUND CONSULT RELATED TO:  Mid Abdomen    WOUND TEAM PLAN OF CARE - Frequency of Follow-up:   Nursing to follow dressing orders written for wound care. 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 Pt had abdominal surgery 1.5 years prior for lysis of adhesions. Pt presented to [facility] 1 day prior to admission at [facility] and CT revealed small bowel obstruction. Pt was transferred to [facility] for higher level of care and was noted to have elevated WBC, as well as elevated lactic acid and creatinine in combination with concerning abdominal exam. Pt was therefore taken to OR on 11/5/23 for Ex lap and abthera placement. Pt returned to OR on 11/7/23 for fascia closure and black foam wound vac application. Wound team was subsequently consulted to manage NPWT.       WOUND ASSESSMENT/LDA  Wound 11/05/23 Full Thickness Wound Open Incision Abdomen (Active)   Date First Assessed/Time First Assessed: 11/05/23 1801   Primary Wound Type: Full Thickness Wound  Surgical Wound Type: Open Incision  Location: Abdomen      Assessments 11/9/2023  1:00 PM   Wound Image       Site Assessment Red;Drainage   Periwound Assessment Clean;Dry;Intact   Margins Attached edges;Defined edges   Closure Secondary intention   Drainage Amount Small   Drainage Description Serosanguineous   Treatments Cleansed;Nonselective  debridement;Site care;Topical Lidocaine   Wound Cleansing Approved Wound Cleanser   Periwound Protectant No-sting Skin Prep;Drape   Dressing Status Clean;Dry;Intact   Dressing Changed Changed   Dressing Cleansing/Solutions Not Applicable   Dressing Options Wound Vac   Dressing Change/Treatment Frequency Tuesday, Thursday, Saturday, and As Needed   NEXT Dressing Change/Treatment Date 11/11/23   NEXT Weekly Photo (Inpatient Only) 11/16/23   Wound Team Following 3x Weekly   Non-staged Wound Description Full thickness   Wound Length (cm) 24 cm   Wound Width (cm) 3.4 cm   Wound Depth (cm) 3.1 cm   Wound Surface Area (cm^2) 81.6 cm^2   Wound Volume (cm^3) 252.96 cm^3   Shape Linear   Wound Odor None   WOUND NURSE ONLY - Time Spent with Patient (mins) 60       Negative Pressure Wound Therapy 11/05/23 Surgical Abdomen (Active)   Placement Date/Time: 11/05/23 1802   Inserted by: OR  Wound Type: Surgical  Location: Abdomen      Assessments 11/9/2023  1:00 PM   Wound Image     Vacuum Serial Number [id number]   NPWT Pump Mode / Pressure Setting Ulta;Continuous;125 mmHg   Dressing Type Medium;Black Foam (Regular)   Number of Foam Pieces Used 3   Canister Changed No   NEXT Dressing Change/Treatment Date 11/11/23        Vascular:    BARB:   No results found.    Lab Values:    Lab Results   Component Value Date/Time    WBC 13.3 (H) 11/09/2023 05:00 AM    RBC 2.29 (L) 11/09/2023 05:00 AM    HEMOGLOBIN 7.1 (L) 11/09/2023 05:00 AM    HEMATOCRIT 22.1 (L) 11/09/2023 05:00 AM         Culture Results show:  No results found for this or any previous visit (from the past 720 hour(s)).    Pain Level/Medicated:  4% Lidocaine allowed to dwell on wound bed 60min prior and PO pain medications administered by bedside RN 30min prior       INTERVENTIONS BY WOUND TEAM:  Chart and images reviewed. Discussed with bedside RN. All areas of concern (based on picture review, LDA review and discussion with bedside RN) have been thoroughly assessed.  Documentation of areas based on significant findings. This RN in to assess patient. Performed standard wound care which includes appropriate positioning, dressing removal and non-selective debridement. Pictures and measurements obtained weekly if/when required.    Wound:  Mid Abdomen  Preparation for Dressing removal: Dressing soaked with adhesive remover spray, Dressing soaked with Lidocaine, and Staples removed from skin edge/foam.  Cleansed/Non-selectively Debrided with:  Wound cleanser and Gauze  Shira wound: Cleansed with Wound cleanser and Gauze, Prepped with No Sting and Drape  Primary Dressing:  One piece of spiraled full thickness black regular foam was packed into wound bed. Second piece of full thickness black regular foam was applied at distal end to fill wound depth. Foam was then secured with drape.  Secondary (Outer) Dressing: A hole was cut in drape and a 3rd and final piece of half thickness circular black foam was applied as a button for trac pad. Trac pad applied and suction resumed. No leaks noted.    Bilateral heels and sacrum were assessed and are intact, offloading dressings applied.    Advanced Wound Care Discharge Planning  Number of Clinicians necessary to complete wound care: 1  Is patient requiring IV pain medications for dressing changes:  No   Length of time for dressing change 30 min. (This does not include chart review, pre-medication time, set up, clean up or time spent charting.)    Interdisciplinary consultation: Patient, Bedside RN (Johnson), Cielo SCOTT (Wound RN).  Pressure injury and staging reviewed with N/A.    EVALUATION / RATIONALE FOR TREATMENT:     Date:  11/09/23  Wound Status:  Initial evaluation    Pt with fresh abdominal incision. Minimal granulation tissue noted that this point. Regular NPWT applied to assist in granular tissue development and wound closure.         Goals: Steady decrease in wound area and depth weekly.    NURSING PLAN OF CARE ORDERS:  Dressing changes:  See Dressing Care orders    NUTRITION RECOMMENDATIONS   Wound Team Recommendations:  N/A    DIET ORDERS (From admission to next 24h)       Start     Ordered    11/09/23 1101  Diet Order Diet: Clear Liquid  ALL MEALS        Question:  Diet:  Answer:  Clear Liquid    11/09/23 1102                    PREVENTATIVE INTERVENTIONS:    Q shift Juan Antonio - performed per nursing policy  Q shift pressure point assessments - performed per nursing policy    Surface/Positioning  ICU Low Airloss - Currently in Place  TAPs Turning system - Currently in Place  Waffle overlay  - Currently in Place    Offloading/Redistribution  Heel offloading dressing (Silicone dressing) - Applied this Visit  Float Heels off Bed with Pillows - Currently in Place           Respiratory  Silicone O2 tubing - Currently in Place  Gray Foam Ear protectors - Currently in Place    Containment/Moisture Prevention    Dri-arnoldo pad - Currently in Place  Arshad Catheter - Currently in Place  Barrier paste - Currently in Place    Anticipated discharge plans:  Home Health Care        Vac Discharge Needs:  Vac Discharge plan is purely a recommendation from wound team and not a requirement for discharge unless otherwise stated by physician.  Regular Vac in use and continued at discharge

## 2023-11-09 NOTE — PROGRESS NOTES
2110 - pt attempting to climb out of bed and becoming very angry with me and staff    2114 - I attempted to reorient patient and prevent her from pulling out her central line and other medical equipment attached her and patient became combative with me and charge nurse    2116 - patient was requesting to call her son Jeffrey, I called patients son, notified him of the situation and she did not believe it was him.    2136 - Mariaelena (daughter) was then called and made aware of the events above and patient did not believe she was talking to her daughter and told me all this was being made up.    2201 - patient became very agitated again, attempting to pull out central line, and continuously ripping ECG cords off; Dr. barraza notified immediately, per MD okay to place bilateral non violent soft wrist restraints and can give haldol 5 mg once PRN, orders received and carried out

## 2023-11-09 NOTE — PROGRESS NOTES
"  DATE: 11/9/2023    Post Operative Day 4  Exploratory laparotomy, segmental small bowel resection for internal hernia with necrotic bowel, and damage control closure.     Post Operative Day 2  Second look laparotomy, ileocolic resection, delayed primary fascial closure, and VAC dressing.     INTERVAL EVENTS:    No peritoneal signs. Abdominal vac functioning. Tolerating clears.    - Electrolyte supplementation. Iron replacement per pharmacy kinetics.   - Continue central line, need for access.  DC ackerman.  - 11/5 DVT study with acute/subacute occlusive thrombus in the left posterior tibial vein. Pharmacological DVT prophylaxis initiated 11/8. Follow up duplex on 11/12.   - Clinically stable, transfer to [facility].  - Physiatry consult.     REVIEW OF SYSTEMS:  Comprehensive review of systems is negative with the exception of the aforementioned HPI, PMH, and PSH bullets in accordance with CMS guidelines.    PHYSICAL EXAMINATION:  Vital Signs: BP (!) 141/60   Pulse 84   Temp 36.7 °C (98 °F) (Temporal)   Resp 20   Ht 1.626 m (5' 4\")   Wt 69.4 kg (153 lb)   SpO2 92%   Physical Exam  Vitals and nursing note reviewed.   Constitutional:       General: She is not in acute distress.  HENT:      Head: Normocephalic.      Mouth/Throat:      Mouth: Mucous membranes are moist.      Pharynx: Oropharynx is clear.   Eyes:      General:         Right eye: No discharge.         Left eye: No discharge.   Cardiovascular:      Rate and Rhythm: Normal rate and regular rhythm.      Pulses: Normal pulses.   Pulmonary:      Effort: No respiratory distress.   Chest:      Chest wall: No tenderness.   Abdominal:      General: There is no distension.      Tenderness: There is abdominal tenderness. There is no guarding or rebound.      Comments: Midline abdominal wound vac.    Genitourinary:     Comments: Ackerman catheter.  Musculoskeletal:      Comments: Moves all extremities    Neurological:      Mental Status: She is alert.         LABORATORY " VALUES:  Recent Labs     11/07/23 0435 11/08/23 0410 11/09/23  0500   WBC 12.8* 15.6* 13.3*   RBC 2.58* 2.54* 2.29*   HEMOGLOBIN 8.2* 7.9* 7.1*   HEMATOCRIT 24.5* 24.1* 22.1*   MCV 95.0 94.9 96.5   MCH 31.8 31.1 31.0   MCHC 33.5 32.8 32.1*   RDW 48.4 49.0 50.1*   PLATELETCT 194 213 201   MPV 10.0 9.6 9.2     Recent Labs     11/07/23 0435 11/08/23  0410 11/09/23  0500   SODIUM 139 141 143   POTASSIUM 3.9 3.7 3.2*   CHLORIDE 106 108 111   CO2 23 21 21   GLUCOSE 97 111* 88   BUN 35* 27* 26*   CREATININE 1.32 0.92 0.81   CALCIUM 8.2* 8.0* 7.9*     Recent Labs     11/07/23 0435 11/08/23 0410 11/09/23  0500   ASTSGOT 22 22 23   ALTSGPT 10 14 17   TBILIRUBIN 0.3 0.2 <0.2   ALKPHOSPHAT 55 60 63   GLOBULIN 2.3 2.6 2.2           IMAGING:  DX-CHEST-PORTABLE (1 VIEW)   Final Result         1.  Left basilar atelectasis, no focal infiltrate      DX-CHEST-PORTABLE (1 VIEW)   Final Result         1.  Left retrocardiac density suggesting subtle infiltrate.      DX-CHEST-PORTABLE (1 VIEW)   Final Result         1.  Left basilar atelectasis or early infiltrate.      EC-ECHOCARDIOGRAM COMPLETE W/ CONT   Final Result      DX-CHEST-PORTABLE (1 VIEW)   Final Result         1.  Left basilar atelectasis or early infiltrate.      DX-ABDOMEN FOR TUBE PLACEMENT   Final Result      1.  Enteric tube overlies the proximal stomach.      DX-CHEST-PORTABLE (1 VIEW)   Final Result      1.  There is no acute cardiopulmonary process.   2.  Satisfactory appearance of the tubes and lines.      US-EXTREMITY VENOUS LOWER BILAT   Final Result      OUTSIDE IMAGES-CT ABDOMEN /PELVIS   Final Result      OUTSIDE IMAGES-DX CHEST   Final Result      DX-CHEST-PORTABLE (1 VIEW)   Final Result      1.  There is no acute cardiopulmonary process.          ASSESSMENT AND PLAN:  * Small bowel obstruction (HCC)- (present on admission)  Assessment & Plan  11/5 Emergent Exploratory laparotomy with bowel resection.   Left in discontinuity / ABThera  11/7 Additional  small bowel resected at ileum, and proximal anastomosis, fascial closure.    11/8 Zosyn day 2 of 4 from source control and closure    Discharge planning issues- (present on admission)  Assessment & Plan  Date of admission: 11/5/2023.  11/9 Transfer orders from SICU.  11/9 Rehab referral.  Cleared for discharge: No.  Discharge delayed: No.  Discharge date: tbd.    Acute deep vein thrombosis (DVT) ([facility])- (present on admission)  Assessment & Plan  11/5 Acute/subacute occlusive thrombus in the left posterior tibial vein.    11/8 Pharmacological DVT prophylaxis, Lovenox 40 mg Q day initiated.   11/12 Follow up trauma duplex.      Acute blood loss anemia  Assessment & Plan  Transfuse 1 unit PRBC if Hb < 7.0  11/8 Iron replacement per pharmacy kinetics    No contraindication to deep vein thrombosis (DVT) prophylaxis- (present on admission)  Assessment & Plan  11/8 Pharmacological DVT prophylaxis, Lovenox initiated     Respiratory failure following trauma and surgery (HCC)- (present on admission)  Assessment & Plan  Left intubated post op  11/7 Extubated     Elevated serum creatinine- (present on admission)  Assessment & Plan  IV hydration and trend.    Sepsis ([facility])- (present on admission)  Assessment & Plan   Septic shock Present on admission  SIRS criteria identified on my evaluation include: Tachycardia, with heart rate greater than 90 BPM and Tachypnea, with respirations greater than 20 per minute  Clinical indicators of end organ dysfunction include Hypotension with systolic blood pressure less than 90 or MAP less than 65   Sources : necrotic bowel  Sepsis protocol initiated  Crystalloid Fluid Administration: Fluid resuscitation ordered per standard protocol - 30 mL/kg per current or ideal body weight  IV antibiotics as appropriate for source of sepsis  End points assessed.   IV pressor support initiated in OR    HLD (hyperlipidemia)- (present on admission)  Assessment & Plan  Chronic condition treated with  Zocor.  Holding maintenance medication during acute septic illness.    Bilateral leg edema- (present on admission)  Assessment & Plan  Chronic condition     Hypothyroid- (present on admission)  Assessment & Plan  Chronic condition treated with Synthroid.  Holding maintenance medication during acute septic illness.         ____________________________________     LEONID Quick    DD: 11/9/2023  1:21 PM

## 2023-11-09 NOTE — PROGRESS NOTES
At approximately 1830 pt had episode where she was nonresponsive to verbal stimuli and had to be repeatedly sternal rubbed to open eyes.  She no longer followed commands, was nonverbal and localized pain.  Pupils were PERRL and she showed no signs of a focal defecit.  Patient after some time returned to her baseline from today.  MD Dr Odom made aware of this event.  No new orders received at this time, continue to observe.

## 2023-11-09 NOTE — CARE PLAN
Problem: Hemodynamics  Goal: Patient's hemodynamics, fluid balance and neurologic status will be stable or improve  Outcome: Progressing     Problem: Urinary - Renal Perfusion  Goal: Ability to achieve and maintain adequate renal perfusion and functioning will improve  Outcome: Progressing     Problem: Fall Risk  Goal: Patient will remain free from falls  Outcome: Progressing     The patient is Watcher - Medium risk of patient condition declining or worsening    Shift Goals  Clinical Goals: pain control, hemodynamic stability  Patient Goals: AMELIA  Family Goals: AMELIA    Progress made toward(s) clinical / shift goals:  stable labs, stable vital signs    Patient is not progressing towards the following goals: neuro status

## 2023-11-09 NOTE — ASSESSMENT & PLAN NOTE
Date of admission: 11/5/2023.  11/9 Transfer orders from SICU.  11/9 Rehab referral.  Cleared for discharge: No.  Discharge delayed: No.  Discharge date: tbd.

## 2023-11-09 NOTE — WOUND TEAM
Assisted Tayo CHENG (Wound RN), with wound care, non-selective debridement performed using wound cleanser/NS and gauze. Please see Tayo CHENG (Wound RN) wound note for further wound care details.

## 2023-11-09 NOTE — PROGRESS NOTES
"  DATE: 11/9/2023    Post Operative Day 4  Exploratory laparotomy, segmental small bowel resection for internal hernia with necrotic bowel, and damage control closure.   Post Operative Day 2  Second look laparotomy, ileocolic resection, delayed primary fascial closure, and VAC dressing.     PHYSICAL EXAMINATION:  Vital Signs: /64   Pulse 74   Temp 36.6 °C (97.8 °F) (Temporal)   Resp 16   Ht 1.626 m (5' 4\")   Wt 69.4 kg (153 lb)   SpO2 92%     Clear.  Regular.  Abdomen is soft and minimally tender.    LABORATORY VALUES:   Recent Labs     11/07/23 0435 11/08/23 0410 11/09/23  0500   WBC 12.8* 15.6* 13.3*   RBC 2.58* 2.54* 2.29*   HEMOGLOBIN 8.2* 7.9* 7.1*   HEMATOCRIT 24.5* 24.1* 22.1*   MCV 95.0 94.9 96.5   MCH 31.8 31.1 31.0   MCHC 33.5 32.8 32.1*   RDW 48.4 49.0 50.1*   PLATELETCT 194 213 201   MPV 10.0 9.6 9.2     Recent Labs     11/07/23 0435 11/08/23 0410 11/09/23  0500   SODIUM 139 141 143   POTASSIUM 3.9 3.7 3.2*   CHLORIDE 106 108 111   CO2 23 21 21   GLUCOSE 97 111* 88   BUN 35* 27* 26*   CREATININE 1.32 0.92 0.81   CALCIUM 8.2* 8.0* 7.9*       ASSESSMENT AND PLAN:  Continued progress.  Recommend advance to full liquid diet.  Potassium supplementation.    Appreciate ICU and consulting physician care.       ____________________________________     Tobi Locke M.D.    DD: 11/9/2023  7:54 AM    "

## 2023-11-10 PROBLEM — R79.89 ELEVATED SERUM CREATININE: Status: RESOLVED | Noted: 2023-11-05 | Resolved: 2023-11-10

## 2023-11-10 PROBLEM — R60.0 BILATERAL LEG EDEMA: Status: RESOLVED | Noted: 2023-11-05 | Resolved: 2023-11-10

## 2023-11-10 PROBLEM — A41.9 SEPSIS (HCC): Status: RESOLVED | Noted: 2023-11-05 | Resolved: 2023-11-10

## 2023-11-10 PROBLEM — J95.821 RESPIRATORY FAILURE FOLLOWING TRAUMA AND SURGERY (HCC): Status: RESOLVED | Noted: 2023-11-05 | Resolved: 2023-11-10

## 2023-11-10 PROBLEM — D64.9 ANEMIA: Status: ACTIVE | Noted: 2023-11-07

## 2023-11-10 LAB
ALBUMIN SERPL BCP-MCNC: 2.2 G/DL (ref 3.2–4.9)
ALBUMIN/GLOB SERPL: 1 G/DL
ALP SERPL-CCNC: 64 U/L (ref 30–99)
ALT SERPL-CCNC: 14 U/L (ref 2–50)
ANION GAP SERPL CALC-SCNC: 10 MMOL/L (ref 7–16)
AST SERPL-CCNC: 19 U/L (ref 12–45)
BASOPHILS # BLD AUTO: 0.3 % (ref 0–1.8)
BASOPHILS # BLD: 0.03 K/UL (ref 0–0.12)
BILIRUB SERPL-MCNC: 0.2 MG/DL (ref 0.1–1.5)
BUN SERPL-MCNC: 20 MG/DL (ref 8–22)
CALCIUM ALBUM COR SERPL-MCNC: 9.1 MG/DL (ref 8.5–10.5)
CALCIUM SERPL-MCNC: 7.7 MG/DL (ref 8.5–10.5)
CHLORIDE SERPL-SCNC: 109 MMOL/L (ref 96–112)
CO2 SERPL-SCNC: 21 MMOL/L (ref 20–33)
CREAT SERPL-MCNC: 0.75 MG/DL (ref 0.5–1.4)
EOSINOPHIL # BLD AUTO: 0.29 K/UL (ref 0–0.51)
EOSINOPHIL NFR BLD: 2.9 % (ref 0–6.9)
ERYTHROCYTE [DISTWIDTH] IN BLOOD BY AUTOMATED COUNT: 49 FL (ref 35.9–50)
GFR SERPLBLD CREATININE-BSD FMLA CKD-EPI: 80 ML/MIN/1.73 M 2
GLOBULIN SER CALC-MCNC: 2.1 G/DL (ref 1.9–3.5)
GLUCOSE SERPL-MCNC: 86 MG/DL (ref 65–99)
HCT VFR BLD AUTO: 22.7 % (ref 37–47)
HGB BLD-MCNC: 7.4 G/DL (ref 12–16)
IMM GRANULOCYTES # BLD AUTO: 0.4 K/UL (ref 0–0.11)
IMM GRANULOCYTES NFR BLD AUTO: 3.9 % (ref 0–0.9)
LYMPHOCYTES # BLD AUTO: 1.15 K/UL (ref 1–4.8)
LYMPHOCYTES NFR BLD: 11.3 % (ref 22–41)
MCH RBC QN AUTO: 31.2 PG (ref 27–33)
MCHC RBC AUTO-ENTMCNC: 32.6 G/DL (ref 32.2–35.5)
MCV RBC AUTO: 95.8 FL (ref 81.4–97.8)
MONOCYTES # BLD AUTO: 0.9 K/UL (ref 0–0.85)
MONOCYTES NFR BLD AUTO: 8.9 % (ref 0–13.4)
NEUTROPHILS # BLD AUTO: 7.39 K/UL (ref 1.82–7.42)
NEUTROPHILS NFR BLD: 72.7 % (ref 44–72)
NRBC # BLD AUTO: 0 K/UL
NRBC BLD-RTO: 0 /100 WBC (ref 0–0.2)
PLATELET # BLD AUTO: 248 K/UL (ref 164–446)
PMV BLD AUTO: 9.1 FL (ref 9–12.9)
POTASSIUM SERPL-SCNC: 3.4 MMOL/L (ref 3.6–5.5)
PROT SERPL-MCNC: 4.3 G/DL (ref 6–8.2)
RBC # BLD AUTO: 2.37 M/UL (ref 4.2–5.4)
SODIUM SERPL-SCNC: 140 MMOL/L (ref 135–145)
WBC # BLD AUTO: 10.2 K/UL (ref 4.8–10.8)

## 2023-11-10 PROCEDURE — 80053 COMPREHEN METABOLIC PANEL: CPT

## 2023-11-10 PROCEDURE — 700102 HCHG RX REV CODE 250 W/ 637 OVERRIDE(OP)

## 2023-11-10 PROCEDURE — 700105 HCHG RX REV CODE 258: Performed by: SURGERY

## 2023-11-10 PROCEDURE — 700111 HCHG RX REV CODE 636 W/ 250 OVERRIDE (IP): Mod: JZ | Performed by: SURGERY

## 2023-11-10 PROCEDURE — 85025 COMPLETE CBC W/AUTO DIFF WBC: CPT

## 2023-11-10 PROCEDURE — 97530 THERAPEUTIC ACTIVITIES: CPT

## 2023-11-10 PROCEDURE — 97535 SELF CARE MNGMENT TRAINING: CPT

## 2023-11-10 PROCEDURE — 700102 HCHG RX REV CODE 250 W/ 637 OVERRIDE(OP): Performed by: SURGERY

## 2023-11-10 PROCEDURE — 99024 POSTOP FOLLOW-UP VISIT: CPT

## 2023-11-10 PROCEDURE — 770001 HCHG ROOM/CARE - MED/SURG/GYN PRIV*

## 2023-11-10 PROCEDURE — A9270 NON-COVERED ITEM OR SERVICE: HCPCS

## 2023-11-10 PROCEDURE — 51798 US URINE CAPACITY MEASURE: CPT

## 2023-11-10 PROCEDURE — A9270 NON-COVERED ITEM OR SERVICE: HCPCS | Performed by: SURGERY

## 2023-11-10 RX ORDER — ATORVASTATIN CALCIUM 20 MG/1
20 TABLET, FILM COATED ORAL EVERY EVENING
Status: DISCONTINUED | OUTPATIENT
Start: 2023-11-10 | End: 2023-11-21 | Stop reason: HOSPADM

## 2023-11-10 RX ORDER — ACYCLOVIR 200 MG/1
800 CAPSULE ORAL 2 TIMES DAILY
COMMUNITY

## 2023-11-10 RX ORDER — FAMOTIDINE 20 MG/1
20 TABLET, FILM COATED ORAL 2 TIMES DAILY
Status: DISCONTINUED | OUTPATIENT
Start: 2023-11-10 | End: 2023-11-20

## 2023-11-10 RX ORDER — ACETAMINOPHEN 160 MG
1 TABLET,DISINTEGRATING ORAL DAILY
Status: DISCONTINUED | OUTPATIENT
Start: 2023-11-10 | End: 2023-11-10

## 2023-11-10 RX ORDER — TIZANIDINE 4 MG/1
2 TABLET ORAL 3 TIMES DAILY PRN
Status: DISCONTINUED | OUTPATIENT
Start: 2023-11-10 | End: 2023-11-10

## 2023-11-10 RX ORDER — POTASSIUM CHLORIDE 20 MEQ/1
40 TABLET, EXTENDED RELEASE ORAL ONCE
Status: COMPLETED | OUTPATIENT
Start: 2023-11-10 | End: 2023-11-10

## 2023-11-10 RX ORDER — NAPROXEN 500 MG/1
500 TABLET ORAL
Status: ON HOLD | COMMUNITY
End: 2023-11-11

## 2023-11-10 RX ORDER — PROMETHAZINE HYDROCHLORIDE 25 MG/1
25 TABLET ORAL EVERY 6 HOURS PRN
Status: ON HOLD | COMMUNITY
End: 2023-11-11

## 2023-11-10 RX ORDER — VITAMIN B COMPLEX
2000 TABLET ORAL DAILY
Status: DISCONTINUED | OUTPATIENT
Start: 2023-11-10 | End: 2023-11-21 | Stop reason: HOSPADM

## 2023-11-10 RX ORDER — ONDANSETRON 8 MG/1
8 TABLET, ORALLY DISINTEGRATING ORAL EVERY 8 HOURS
Status: ON HOLD | COMMUNITY
End: 2023-11-11

## 2023-11-10 RX ORDER — DOCUSATE SODIUM 100 MG/1
100 CAPSULE, LIQUID FILLED ORAL 2 TIMES DAILY PRN
COMMUNITY

## 2023-11-10 RX ORDER — ATORVASTATIN CALCIUM 20 MG/1
20 TABLET, FILM COATED ORAL NIGHTLY
COMMUNITY

## 2023-11-10 RX ORDER — GABAPENTIN 300 MG/1
300 CAPSULE ORAL 4 TIMES DAILY
Status: DISCONTINUED | OUTPATIENT
Start: 2023-11-10 | End: 2023-11-21 | Stop reason: HOSPADM

## 2023-11-10 RX ORDER — SIMVASTATIN 40 MG
40 TABLET ORAL NIGHTLY
Status: DISCONTINUED | OUTPATIENT
Start: 2023-11-10 | End: 2023-11-10

## 2023-11-10 RX ORDER — DICYCLOMINE HCL 20 MG
20 TABLET ORAL
Status: ON HOLD | COMMUNITY
End: 2023-11-11

## 2023-11-10 RX ORDER — LEVOTHYROXINE SODIUM 0.03 MG/1
25 TABLET ORAL EVERY EVENING
Status: DISCONTINUED | OUTPATIENT
Start: 2023-11-10 | End: 2023-11-21 | Stop reason: HOSPADM

## 2023-11-10 RX ADMIN — ACETAMINOPHEN 650 MG: 325 TABLET, FILM COATED ORAL at 00:48

## 2023-11-10 RX ADMIN — ACETAMINOPHEN 650 MG: 325 TABLET, FILM COATED ORAL at 23:56

## 2023-11-10 RX ADMIN — GABAPENTIN 300 MG: 300 CAPSULE ORAL at 23:56

## 2023-11-10 RX ADMIN — LEVOTHYROXINE SODIUM 25 MCG: 0.03 TABLET ORAL at 17:05

## 2023-11-10 RX ADMIN — ACETAMINOPHEN 650 MG: 325 TABLET, FILM COATED ORAL at 11:45

## 2023-11-10 RX ADMIN — ATORVASTATIN CALCIUM 20 MG: 20 TABLET, FILM COATED ORAL at 17:04

## 2023-11-10 RX ADMIN — Medication 2000 UNITS: at 11:45

## 2023-11-10 RX ADMIN — FAMOTIDINE 20 MG: 10 INJECTION, SOLUTION INTRAVENOUS at 17:05

## 2023-11-10 RX ADMIN — SODIUM CHLORIDE, POTASSIUM CHLORIDE, SODIUM LACTATE AND CALCIUM CHLORIDE: 600; 310; 30; 20 INJECTION, SOLUTION INTRAVENOUS at 00:48

## 2023-11-10 RX ADMIN — GABAPENTIN 300 MG: 300 CAPSULE ORAL at 11:45

## 2023-11-10 RX ADMIN — SODIUM CHLORIDE, POTASSIUM CHLORIDE, SODIUM LACTATE AND CALCIUM CHLORIDE: 600; 310; 30; 20 INJECTION, SOLUTION INTRAVENOUS at 10:44

## 2023-11-10 RX ADMIN — FAMOTIDINE 20 MG: 10 INJECTION, SOLUTION INTRAVENOUS at 06:03

## 2023-11-10 RX ADMIN — PIPERACILLIN AND TAZOBACTAM 4.5 G: 4; .5 INJECTION, POWDER, FOR SOLUTION INTRAVENOUS at 14:00

## 2023-11-10 RX ADMIN — POTASSIUM CHLORIDE 40 MEQ: 1500 TABLET, EXTENDED RELEASE ORAL at 09:21

## 2023-11-10 RX ADMIN — PIPERACILLIN AND TAZOBACTAM 4.5 G: 4; .5 INJECTION, POWDER, FOR SOLUTION INTRAVENOUS at 06:05

## 2023-11-10 RX ADMIN — ENOXAPARIN SODIUM 40 MG: 100 INJECTION SUBCUTANEOUS at 09:21

## 2023-11-10 RX ADMIN — GABAPENTIN 300 MG: 300 CAPSULE ORAL at 17:05

## 2023-11-10 RX ADMIN — ACETAMINOPHEN 650 MG: 325 TABLET, FILM COATED ORAL at 17:05

## 2023-11-10 RX ADMIN — ACETAMINOPHEN 650 MG: 325 TABLET, FILM COATED ORAL at 05:49

## 2023-11-10 ASSESSMENT — COGNITIVE AND FUNCTIONAL STATUS - GENERAL
DRESSING REGULAR LOWER BODY CLOTHING: A LITTLE
WALKING IN HOSPITAL ROOM: A LITTLE
SUGGESTED CMS G CODE MODIFIER DAILY ACTIVITY: CK
MOBILITY SCORE: 15
DAILY ACTIVITIY SCORE: 18
PERSONAL GROOMING: A LITTLE
EATING MEALS: A LITTLE
DRESSING REGULAR UPPER BODY CLOTHING: A LITTLE
TURNING FROM BACK TO SIDE WHILE IN FLAT BAD: A LOT
TOILETING: A LITTLE
CLIMB 3 TO 5 STEPS WITH RAILING: A LITTLE
MOVING FROM LYING ON BACK TO SITTING ON SIDE OF FLAT BED: A LOT
MOVING TO AND FROM BED TO CHAIR: A LOT
SUGGESTED CMS G CODE MODIFIER MOBILITY: CK
HELP NEEDED FOR BATHING: A LITTLE
STANDING UP FROM CHAIR USING ARMS: A LITTLE

## 2023-11-10 ASSESSMENT — GAIT ASSESSMENTS
DEVIATION: BRADYKINETIC;DECREASED HEEL STRIKE;DECREASED TOE OFF
GAIT LEVEL OF ASSIST: STANDBY ASSIST
ASSISTIVE DEVICE: FRONT WHEEL WALKER
DISTANCE (FEET): 30

## 2023-11-10 NOTE — PROGRESS NOTES
"  DATE: 11/10/2023    Hospital Day 5  small bowel obstruction & volvulus .    Postoperative Day # 5 Exploratory laparotomy, small bowel resection, & ABThera Placement.    Postoperative Day # 3 Exploratory laparotomy, small bowel resection, primary anastomosis, & negative pressure dressing application.    INTERVAL EVENTS:  Transferred from TICU to [facility].  WBC normalized on Zosyn.   Zosyn course to complete this afternoon.   Tolerating diet with bowel movement yesterday.  Hemoglobin trend up with iron supplementation.    PHYSICAL EXAMINATION:  Vital Signs: /63   Pulse 70   Temp 36.6 °C (97.9 °F) (Temporal)   Resp 17   Ht 1.626 m (5' 4\")   Wt 69.4 kg (153 lb)   SpO2 95%     Alert, afebrile, no acute distress.  Unlabored respirations tolerating room air.   Abdomen soft & non distended.  Minimal incisional tenderness.   Midline abdominal incision with vac intact & functioning.  Tolerating diet, no nausea or vomiting.   + flatus   Last BM 11/9      LABORATORY VALUES:  Recent Labs     11/08/23 0410 11/09/23  0500 11/10/23  0600   WBC 15.6* 13.3* 10.2   RBC 2.54* 2.29* 2.37*   HEMOGLOBIN 7.9* 7.1* 7.4*   HEMATOCRIT 24.1* 22.1* 22.7*   MCV 94.9 96.5 95.8   MCH 31.1 31.0 31.2   MCHC 32.8 32.1* 32.6   RDW 49.0 50.1* 49.0   PLATELETCT 213 201 248   MPV 9.6 9.2 9.1     Recent Labs     11/08/23 0410 11/09/23  0500 11/10/23  0600   SODIUM 141 143 140   POTASSIUM 3.7 3.2* 3.4*   CHLORIDE 108 111 109   CO2 21 21 21   GLUCOSE 111* 88 86   BUN 27* 26* 20   CREATININE 0.92 0.81 0.75   CALCIUM 8.0* 7.9* 7.7*     Recent Labs     11/08/23  0410 11/09/23  0500 11/10/23  0600   ASTSGOT 22 23 19   ALTSGPT 14 17 14   TBILIRUBIN 0.2 <0.2 0.2   ALKPHOSPHAT 60 63 64   GLOBULIN 2.6 2.2 2.1            IMAGING:  DX-CHEST-PORTABLE (1 VIEW)   Final Result         1.  Left basilar atelectasis, no focal infiltrate      DX-CHEST-PORTABLE (1 VIEW)   Final Result         1.  Left retrocardiac density suggesting subtle infiltrate.    "   DX-CHEST-PORTABLE (1 VIEW)   Final Result         1.  Left basilar atelectasis or early infiltrate.      EC-ECHOCARDIOGRAM COMPLETE W/ CONT   Final Result      DX-CHEST-PORTABLE (1 VIEW)   Final Result         1.  Left basilar atelectasis or early infiltrate.      DX-ABDOMEN FOR TUBE PLACEMENT   Final Result      1.  Enteric tube overlies the proximal stomach.      DX-CHEST-PORTABLE (1 VIEW)   Final Result      1.  There is no acute cardiopulmonary process.   2.  Satisfactory appearance of the tubes and lines.      US-EXTREMITY VENOUS LOWER BILAT   Final Result      OUTSIDE IMAGES-CT ABDOMEN /PELVIS   Final Result      OUTSIDE IMAGES-DX CHEST   Final Result      DX-CHEST-PORTABLE (1 VIEW)   Final Result      1.  There is no acute cardiopulmonary process.        ASSESSMENT AND PLAN:  * Small bowel obstruction (HCC)- (present on admission)  Assessment & Plan  11/5 Emergent Exploratory laparotomy with bowel resection.   Left in discontinuity / ABThera  11/7 Additional small bowel resected at ileum, and proximal anastomosis, fascial closure.    11/10 Zosyn day 4 of 4 from source control and closure    Anemia  Assessment & Plan  On oral iron supplementation outpatient.  11/8 Iron studies low, replacement ordered per pharmacy.  Trend hemograms and transfuse packed red cells for hemoglobin less than 7.0    HLD (hyperlipidemia)- (present on admission)  Assessment & Plan  Chronic condition treated with Zocor.  Holding maintenance medication during acute septic illness.  11/10 Simvastatin resumed.    No contraindication to deep vein thrombosis (DVT) prophylaxis- (present on admission)  Assessment & Plan  11/8 Pharmacological DVT prophylaxis, Lovenox initiated     Acute deep vein thrombosis (DVT) (HCC)- (present on admission)  Assessment & Plan  11/5 Acute/subacute occlusive thrombus in the left posterior tibial vein.    11/8 Pharmacological DVT prophylaxis, Lovenox 40 mg Q day initiated.   11/12 Follow up trauma duplex.       Hypothyroid- (present on admission)  Assessment & Plan  Chronic condition treated with levothyroxine.  Holding maintenance medication during acute septic illness.  11/10 Resumed levothyroxine.      - Mobilize with PT & OT for final discharge recommendations  - Disposition: pending therapy evaluations, prior evals recommend post acute placement     ____________________________________     LIBORIO Stevenson.

## 2023-11-10 NOTE — DIETARY
Nutrition Update:    Day 5 of admit.  Rubi Haq is a 78 y.o. female with admitting DX of Small bowel obstruction (HCC) [K56.651].  Patient being followed to optimize nutrition.    Current Diet: Clear liquids. PO % x 1 meal. Today is day 5 NPO/clear liquids diet. RD will order Ensure Clear TID with meals to bolster kcal/protein intake while on limited clear liquids diet. If it is not medically feasible to advance beyond clear liquids diet within 48-72 hours, consider nutrition support to meet needs.   Labs: K+ 3.4, Ca 7.7 (corrected Ca 9.1, WNL), alb 2.2    Problem: Nutritional:  Goal: Achieve adequate nutritional intake  Description: Patient will tolerate diet >Clear liquids and consume >50% of meals and supplements.    Outcome: Progressing    RD continues to follow.

## 2023-11-10 NOTE — PROGRESS NOTES
4 Eyes Skin Assessment Completed by JESUS Vasquez and JESUS Figueredo.    Head WDL  Ears WDL  Nose WDL  Mouth WDL  Neck WDL  Breast/Chest WDL  Shoulder Blades WDL  Spine WDL  (R) Arm/Elbow/Hand Swelling  (L) Arm/Elbow/Hand Swelling  Abdomen Incision w/ WV  Groin WDL  Scrotum/Coccyx/Buttocks Redness and Blanching Rectal trumpet present  (R) Leg Swelling  (L) Leg Swelling  (R) Heel/Foot/Toe Swelling  (L) Heel/Foot/Toe Swelling          Devices In Places Blood Pressure Cuff, Pulse Ox, and SCD's      Interventions In Place Gray Ear Foams, TAP System, Pillows, Q2 Turns, and Low Air Loss Mattress    Possible Skin Injury No    Pictures Uploaded Into Epic N/A  Wound Consult Placed N/A  RN Wound Prevention Protocol Ordered No

## 2023-11-10 NOTE — PROGRESS NOTES
4 Eyes Skin Assessment Completed by JESUS Panda and JESUS Martinez.    Head WDL  Ears WDL  Nose WDL  Mouth WDL  Neck R IJ, DIP CDI  Breast/Chest WDL  Shoulder Blades WDL  Spine WDL  (R) Arm/Elbow/Hand Bruising, Swelling, and Edema  (L) Arm/Elbow/Hand Bruising, Swelling, and Edema + skin tear to forearm  Abdomen MLI w/ WV  Groin WDL  Scrotum/Coccyx/Buttocks Redness and Blanching to areas where rectal trumpet is pressed into skin; mepilex applied to thigh  (R) Leg Edema  (L) Leg Edema  (R) Heel/Foot/Toe Edema  (L) Heel/Foot/Toe Edema          Devices In Places Blood Pressure Cuff, Pulse Ox, and SCD's + rectal trumpet      Interventions In Place TAP System, Pillows, Q2 Turns, Low Air Loss Mattress, and Heels Loaded W/Pillows    Possible Skin Injury No    Pictures Uploaded Into Epic Yes  Wound Consult Placed N/A  RN Wound Prevention Protocol Ordered Yes

## 2023-11-10 NOTE — THERAPY
"Physical Therapy   Daily Treatment     Patient Name: Rubi Haq  Age:  78 y.o., Sex:  female  Medical Record #: [id number]  Today's Date: 11/10/2023     Precautions  Precautions: Fall Risk  Comments: Abdominal precautions, wound vac    Assessment    Pt received in bed and eager to participate in PT session. Pt back to baseline cognition and daughter at bedside throughout session for education and family training. Pt plans to return to [place], [place] but will be staying in her daughter's H with 2 ERIKA. Daughter will be home a majority of the time, and plans to have other friends or neighbors come be with her when she is at work.     Pt was able to mobilize with overall standby assist using the FWW for support and occasionally needed min A for standing from low surfaces. Pt also required min A for bringing her LE up on the stairs and had appropriate strength to ascend once there, limitation due to swelling from lymphedema. Per discussion with pt and daughter, pt would prefer outpatient wound care and PT. Will continue to follow for acute PT while in the hospital and anticipate pt DC home once medically cleared.    Plan    Treatment Plan Status: Continue Current Treatment Plan  Type of Treatment: Bed Mobility, Gait Training, Equipment, Neuro Re-Education / Balance, Stair Training, Therapeutic Activities, Therapeutic Exercise  Treatment Frequency: 4 Times per Week  Treatment Duration: Until Therapy Goals Met    DC Equipment Recommendations: None  Discharge Recommendations: Recommend outpatient physical therapy services to address higher level deficits (Per pt and daughter preference.)    Subjective    \"I'm back to normal now. That was so unfortunate that happened in the ICU\"     Objective       11/10/23 1145   Precautions   Precautions Fall Risk   Comments Abdominal precautions, wound vac   Pain 0 - 10 Group   Therapist Pain Assessment Post Activity Pain Same as Prior to Activity;Nurse Notified;2   Cognition  "   Orientation Level Oriented x 4   Level of Consciousness Alert   Ability To Follow Commands 2 Step   Comments Pt now fully oriented and has returned to baseline cognition per daughter. Pt is very pleasant and receptive to education.   Balance   Sitting Balance (Static) Fair +   Sitting Balance (Dynamic) Fair   Standing Balance (Static) Fair   Standing Balance (Dynamic) Fair   Weight Shift Sitting Fair   Weight Shift Standing Fair   Skilled Intervention Verbal Cuing;Compensatory Strategies   Comments with FWW   Bed Mobility    Supine to Sit Contact Guard Assist   Scooting Standby Assist   Rolling Standby Assist   Skilled Intervention Verbal Cuing;Compensatory Strategies   Comments HOB at 10 deg, no use of rail, log roll technique   Gait Analysis   Gait Level Of Assist Standby Assist   Assistive Device Front Wheel Walker   Distance (Feet) 30   # of Times Distance was Traveled 4   Deviation Bradykinetic;Decreased Heel Strike;Decreased Toe Off   # of Stairs Climbed 4   Level of Assist with Stairs Minimal Assist   Skilled Intervention Verbal Cuing;Sequencing;Compensatory Strategies   Comments Pt required min A for bringing LE up to the next step when ascening due to ROM restrictions from lymphedema without having her stockings on all week. Anticipate this will improve as she recently kristina her compression stockings. Daughter present and provided with edu on assisting pt with stairs to enter her home upon discharge.   Functional Mobility   Sit to Stand Standby Assist   Bed, Chair, Wheelchair Transfer Contact Guard Assist   Transfer Method Stand Step   Mobility up with FWW   Skilled Intervention Verbal Cuing;Sequencing;Compensatory Strategies   How much difficulty does the patient currently have...   Turning over in bed (including adjusting bedclothes, sheets and blankets)? 2   Sitting down on and standing up from a chair with arms (e.g., wheelchair, bedside commode, etc.) 2   Moving from lying on back to sitting on the  side of the bed? 2   How much help from another person does the patient currently need...   Moving to and from a bed to a chair (including a wheelchair)? 3   Need to walk in a hospital room? 3   Climbing 3-5 steps with a railing? 3   6 clicks Mobility Score 15   Short Term Goals    Short Term Goal # 1 in 6 visits patient will demo all functional transfers with sup and LRAD for safe DC   Goal Outcome # 1 Progressing as expected   Short Term Goal # 2 in 6 visits patient will ambulate 100' w/FWW and Rossy for safe DC   Goal Outcome # 2 Progressing as expected   Short Term Goal # 3 in 6 visits patient will demo all bed mobility indep for safe DC   Goal Outcome # 3 Progressing as expected   Physical Therapy Treatment Plan   Physical Therapy Treatment Plan Continue Current Treatment Plan   Anticipated Discharge Equipment and Recommendations   DC Equipment Recommendations None   Discharge Recommendations Recommend outpatient physical therapy services to address higher level deficits  (Per pt and daughter preference.)   Interdisciplinary Plan of Care Collaboration   IDT Collaboration with  Nursing;Occupational Therapist;   Patient Position at End of Therapy Seated;Family / Friend in Room;Call Light within Reach;Tray Table within Reach;Phone within Reach   Collaboration Comments RN and CM updated

## 2023-11-10 NOTE — PROGRESS NOTES
Bedside report received.  Assessment complete.  A&O x 3. Patient calls appropriately.   Patient has 0/10 pain. Declined intervention at this time.  See skin note.  Tolerating clear liquid diet. Denies N/V.  - void since ackerman removal today; bladder scan shows 206, + BM output via rectal trumpet.  Reviewed plan of care with patient. Call light and personal belongings within reach. Hourly rounding in place. All needs met at this time.

## 2023-11-10 NOTE — THERAPY
Occupational Therapy  Daily Treatment     Patient Name: Rubi Haq  Age:  78 y.o., Sex:  female  Medical Record #: [id number]  Today's Date: 11/10/2023     Precautions  Precautions: Fall Risk  Comments: Abdominal precautions, wound vac    Assessment    Pt seen for OT tx. Pt demo improvements in cognition since last session; A&Ox4. Pt reports that she is planning to DC to her daughter's house when she is medically clear; daughter present during session. Daughter's home is a 1-story duplex with 3 steps to enter and a tub/shower. Pt's daughter works outside of the home at the local hospital, but reports having a large social [place] who can provide assist as needed. Pt reported that she has a BSC, reacher, SPC, 4WW, and a variety of other AE/DME in her basement. Pt states that she was independent with ADLs and IADLs PTA.     During OT tx session, pt demo ability to complete ADLs, functional mobility, and txfs with CGA/Mod A using FWW. She required increased physical assist to complete OOB ADLs due to significant swelling in BLE (due to lymphedema). Recommended that pt place BSC over daughter's toilet to increase surface height as she required increased physical assist to safely complete txf. Provided pt and pt's daughter equipment resource guide to help them acquire a tub transfer bench to safely get in/out of tub. Pt is currently limited by decreased activity tolerance and swelling in BLE. Will continue to follow in the acute setting for ongoing OT services.     Plan    Treatment Plan Status: Continue Current Treatment Plan  Type of Treatment: Self Care / Activities of Daily Living, Adaptive Equipment, Cognitive Skill Development, Neuro Re-Education / Balance, Manual Therapy Techniques, Therapeutic Exercises, Therapeutic Activity  Treatment Frequency: 4 Times per Week  Treatment Duration: Until Therapy Goals Met    DC Equipment Recommendations: Tub Transfer Bench  Discharge Recommendations: Recommend home health  for continued occupational therapy services (With increased support from family)     Objective      Vitals   O2 Delivery Device None - Room Air   Pain 0 - 10 Group   Therapist Pain Assessment Post Activity Pain Same as Prior to Activity;Nurse Notified  (Not rated, agreeable to activity)   Non Verbal Descriptors   Non Verbal Scale  Calm;Unlabored Breathing   Cognition    Cognition / Consciousness WDL   Level of Consciousness Alert   Comments Significant improvement in cognition; A&Ox4. Daughter present during tx session and reported that the pt is at her cognitive baseline and typical demeanor.   Active ROM Upper Body   Active ROM Upper Body  WDL   Comments Observed during functional tasks   Other Treatments   Other Treatments Provided Pt stated that she plans on discharging to her daughter's home once she is medically clear. Pt's daughter lives in a 1-story duplex with 2 steps to enter and a tub/shower. Pt reports she has a BSC and reachers at home. Recommend tub transfer bench to safely complete txfs in/out of shower. Pt's daughter works outside of the home at the local hospital and comes home for lunch daily at 11:30. Pt's son lives local and can assist if needed. Pt reports that she has friends who can provide assist as well.   Balance   Sitting Balance (Static) Fair +   Sitting Balance (Dynamic) Fair   Standing Balance (Static) Fair   Standing Balance (Dynamic) Fair   Weight Shift Sitting Fair   Weight Shift Standing Fair   Skilled Intervention Verbal Cuing;Tactile Cuing   Comments w/FWW   Bed Mobility    Supine to Sit Contact Guard Assist   Sit to Supine   (Up to chair post)   Scooting Standby Assist   Rolling Standby Assist   Skilled Intervention Verbal Cuing;Tactile Cuing;Compensatory Strategies   Comments HOB slightly elevated, log roll   Activities of Daily Living   Eating Modified Independent   Grooming Supervision;Seated   Upper Body Dressing Minimal Assist  (Don personal robe)   Lower Body Dressing  Moderate Assist  (Assist with donning personal underwear and pants due to significant swelling in BLE)   Toileting Minimal Assist  (Able to complete pericare and clothing management after completing BM and voiding on standard toilet.)   Skilled Intervention Verbal Cuing;Tactile Cuing;Compensatory Strategies   6 Clicks Daily Activity Score 18   Functional Mobility   Sit to Stand Contact Guard Assist   Bed, Chair, Wheelchair Transfer Contact Guard Assist   Toilet Transfers Minimal Assist  (Due to low surface height and swelling in legs. Pt reports that she has a BSC at home; recommended that pt place over daughter's toliet at home to increase surface height)   Mobility Functional mobility in room w/FWW   Skilled Intervention Verbal Cuing;Tactile Cuing   Activity Tolerance   Sitting in Chair Up to chair post   Sitting Edge of Bed 5 min   Standing 8 min   Patient / Family Goals   Patient / Family Goal #1 none stated   Short Term Goals   Short Term Goal # 1 pt will complete UB dressing w/spv   Goal Outcome # 1 Progressing as expected   Short Term Goal # 2 pt will complete txf to BSC w/min A   Goal Outcome # 2 Goal met, new goal added   Short Term Goal # 2 B  Pt will complete ADL txfs with supv   Goal Outcome # 2 B Progressing as expected   Short Term Goal # 3 pt will complete LB dressing w/min A   Goal Outcome # 3 Progressing as expected   Education Group   Education Provided Home Safety;Role of Occupational Therapist;Activities of Daily Living;Adaptive Equipment;Pathology of bedrest   Role of Occupational Therapist Patient Response Patient;Family;Acceptance;Explanation;Verbal Demonstration   Home Safety Patient Response Patient;Family;Acceptance;Explanation;Verbal Demonstration  (Recommend tub transfer bench and having supervision getting in/out of shower to reduce risk of falls)   ADL Patient Response Patient;Family;Acceptance;Explanation;Verbal Demonstration  (Compensatory strategies to safely complete ADLs)    Adaptive Equipment Patient Response Patient;Family;Acceptance;Explanation;Verbal Demonstration   Pathology of Bedrest Patient Response Patient;Family;Acceptance;Explanation;Verbal Demonstration

## 2023-11-10 NOTE — DISCHARGE PLANNING
Care Transition Team Assessment    RN DARRICK met with pt at bedside to complete assessment. Pt A&Ox4 and able to verify the information on the face sheet.       Pt lives alone in a 3 story home, but upon discharge plans on going to her daughter's home  with her  to [address] [place] [address] which is a single story duplex with 3 steps to enter. S/O in a single-story house that has one step to enter.      Prior to this hospitalization pt was independent at home with ADLs and most IADLs.       Pt has a 2WW  and a cane.        Pt reported that her children are good support for her.       Pt is retired and receives SSI monthly deposits.        Pt denies any SA or MH concerns.       Pt  reports having a POA.       Information Source  Orientation Level: Oriented X4  Information Given By: Patient  Who is responsible for making decisions for patient? : Patient    Readmission Evaluation  Is this a readmission?: No    Elopement Risk  Legal Hold: No  Ambulatory or Self Mobile in Wheelchair: No-Not an Elopement Risk  Elopement Risk: Not at Risk for Elopement    Interdisciplinary Discharge Planning  Prior Services: Unable To Determine At This Time    Discharge Preparedness  What is your plan after discharge?: Home with help  What are your discharge supports?: Child  Prior Functional Level: Ambulatory  Difficulity with ADLs: None  Difficulity with IADLs: None    Functional Assesment  Prior Functional Level: Ambulatory         Vision / Hearing Impairment  Vision Impairment : Yes  Right Eye Vision: Wears Glasses  Left Eye Vision: Wears Glasses  Hearing Impairment : Yes  Hearing Impairment: Both Ears  Does Pt Need Special Equipment for the Hearing Impaired?: No         Advance Directive  Advance Directive?: DPOA for Health Care  Durable Power of  Name and Contact : Alfred (Son) and Daughter (Mariaelena) Severino    Domestic Abuse  Have you ever been the victim of abuse or violence?: No  Physical Abuse or Sexual Abuse:  No  Verbal Abuse or Emotional Abuse: No  Possible Abuse/Neglect Reported to:: Not Applicable    Psychological Assessment  History of Substance Abuse: None  History of Psychiatric Problems: No  Non-compliant with Treatment: No  Newly Diagnosed Illness: No    Discharge Risks or Barriers  Discharge risks or barriers?: No    Anticipated Discharge Information  Discharge Disposition: Discharged to home/self care (01)  Discharge Address: daughter's home [address] [place] [id number]  Discharge Contact Phone Number: correct on face sheet

## 2023-11-11 LAB
ABO GROUP BLD: NORMAL
ALBUMIN SERPL BCP-MCNC: 1.8 G/DL (ref 3.2–4.9)
ALBUMIN/GLOB SERPL: 0.9 G/DL
ALP SERPL-CCNC: 57 U/L (ref 30–99)
ALT SERPL-CCNC: 13 U/L (ref 2–50)
ANION GAP SERPL CALC-SCNC: 8 MMOL/L (ref 7–16)
AST SERPL-CCNC: 19 U/L (ref 12–45)
BACTERIA BLD CULT: NORMAL
BACTERIA BLD CULT: NORMAL
BARCODED ABORH UBTYP: 9500
BARCODED PRD CODE UBPRD: NORMAL
BARCODED UNIT NUM UBUNT: NORMAL
BASOPHILS # BLD AUTO: 0.2 % (ref 0–1.8)
BASOPHILS # BLD: 0.02 K/UL (ref 0–0.12)
BILIRUB SERPL-MCNC: <0.2 MG/DL (ref 0.1–1.5)
BLD GP AB SCN SERPL QL: NORMAL
BUN SERPL-MCNC: 13 MG/DL (ref 8–22)
CALCIUM ALBUM COR SERPL-MCNC: 9 MG/DL (ref 8.5–10.5)
CALCIUM SERPL-MCNC: 7.2 MG/DL (ref 8.5–10.5)
CHLORIDE SERPL-SCNC: 113 MMOL/L (ref 96–112)
CO2 SERPL-SCNC: 21 MMOL/L (ref 20–33)
COMPONENT R 8504R: NORMAL
CREAT SERPL-MCNC: 0.67 MG/DL (ref 0.5–1.4)
EOSINOPHIL # BLD AUTO: 0.38 K/UL (ref 0–0.51)
EOSINOPHIL NFR BLD: 3.6 % (ref 0–6.9)
ERYTHROCYTE [DISTWIDTH] IN BLOOD BY AUTOMATED COUNT: 46.9 FL (ref 35.9–50)
ERYTHROCYTE [DISTWIDTH] IN BLOOD BY AUTOMATED COUNT: 49.1 FL (ref 35.9–50)
GFR SERPLBLD CREATININE-BSD FMLA CKD-EPI: 88 ML/MIN/1.73 M 2
GLOBULIN SER CALC-MCNC: 2 G/DL (ref 1.9–3.5)
GLUCOSE SERPL-MCNC: 94 MG/DL (ref 65–99)
HCT VFR BLD AUTO: 21.3 % (ref 37–47)
HCT VFR BLD AUTO: 27.5 % (ref 37–47)
HGB BLD-MCNC: 6.9 G/DL (ref 12–16)
HGB BLD-MCNC: 9.1 G/DL (ref 12–16)
IMM GRANULOCYTES # BLD AUTO: 0.44 K/UL (ref 0–0.11)
IMM GRANULOCYTES NFR BLD AUTO: 4.2 % (ref 0–0.9)
LYMPHOCYTES # BLD AUTO: 1.07 K/UL (ref 1–4.8)
LYMPHOCYTES NFR BLD: 10.2 % (ref 22–41)
MCH RBC QN AUTO: 31.1 PG (ref 27–33)
MCH RBC QN AUTO: 31.1 PG (ref 27–33)
MCHC RBC AUTO-ENTMCNC: 32.4 G/DL (ref 32.2–35.5)
MCHC RBC AUTO-ENTMCNC: 33.1 G/DL (ref 32.2–35.5)
MCV RBC AUTO: 93.9 FL (ref 81.4–97.8)
MCV RBC AUTO: 95.9 FL (ref 81.4–97.8)
MONOCYTES # BLD AUTO: 0.7 K/UL (ref 0–0.85)
MONOCYTES NFR BLD AUTO: 6.7 % (ref 0–13.4)
NEUTROPHILS # BLD AUTO: 7.85 K/UL (ref 1.82–7.42)
NEUTROPHILS NFR BLD: 75.1 % (ref 44–72)
NRBC # BLD AUTO: 0.03 K/UL
NRBC BLD-RTO: 0.3 /100 WBC (ref 0–0.2)
PLATELET # BLD AUTO: 236 K/UL (ref 164–446)
PLATELET # BLD AUTO: 301 K/UL (ref 164–446)
PMV BLD AUTO: 9.2 FL (ref 9–12.9)
PMV BLD AUTO: 9.2 FL (ref 9–12.9)
POTASSIUM SERPL-SCNC: 3.1 MMOL/L (ref 3.6–5.5)
PRODUCT TYPE UPROD: NORMAL
PROT SERPL-MCNC: 3.8 G/DL (ref 6–8.2)
RBC # BLD AUTO: 2.22 M/UL (ref 4.2–5.4)
RBC # BLD AUTO: 2.93 M/UL (ref 4.2–5.4)
RH BLD: NORMAL
SIGNIFICANT IND 70042: NORMAL
SIGNIFICANT IND 70042: NORMAL
SITE SITE: NORMAL
SITE SITE: NORMAL
SODIUM SERPL-SCNC: 142 MMOL/L (ref 135–145)
SOURCE SOURCE: NORMAL
SOURCE SOURCE: NORMAL
UNIT STATUS USTAT: NORMAL
WBC # BLD AUTO: 10.5 K/UL (ref 4.8–10.8)
WBC # BLD AUTO: 12.9 K/UL (ref 4.8–10.8)

## 2023-11-11 PROCEDURE — 700111 HCHG RX REV CODE 636 W/ 250 OVERRIDE (IP): Mod: JZ | Performed by: SURGERY

## 2023-11-11 PROCEDURE — A9270 NON-COVERED ITEM OR SERVICE: HCPCS

## 2023-11-11 PROCEDURE — 86901 BLOOD TYPING SEROLOGIC RH(D): CPT

## 2023-11-11 PROCEDURE — 86923 COMPATIBILITY TEST ELECTRIC: CPT

## 2023-11-11 PROCEDURE — 700101 HCHG RX REV CODE 250: Performed by: SURGERY

## 2023-11-11 PROCEDURE — 302098 PASTE RING (FLAT): Performed by: SURGERY

## 2023-11-11 PROCEDURE — 97605 NEG PRS WND THER DME<=50SQCM: CPT

## 2023-11-11 PROCEDURE — 85025 COMPLETE CBC W/AUTO DIFF WBC: CPT

## 2023-11-11 PROCEDURE — A9270 NON-COVERED ITEM OR SERVICE: HCPCS | Performed by: SURGERY

## 2023-11-11 PROCEDURE — 700102 HCHG RX REV CODE 250 W/ 637 OVERRIDE(OP): Performed by: SURGERY

## 2023-11-11 PROCEDURE — 770001 HCHG ROOM/CARE - MED/SURG/GYN PRIV*

## 2023-11-11 PROCEDURE — 700105 HCHG RX REV CODE 258

## 2023-11-11 PROCEDURE — 36430 TRANSFUSION BLD/BLD COMPNT: CPT

## 2023-11-11 PROCEDURE — 86850 RBC ANTIBODY SCREEN: CPT

## 2023-11-11 PROCEDURE — 700102 HCHG RX REV CODE 250 W/ 637 OVERRIDE(OP)

## 2023-11-11 PROCEDURE — 86900 BLOOD TYPING SEROLOGIC ABO: CPT

## 2023-11-11 PROCEDURE — 30243N1 TRANSFUSION OF NONAUTOLOGOUS RED BLOOD CELLS INTO CENTRAL VEIN, PERCUTANEOUS APPROACH: ICD-10-PCS

## 2023-11-11 PROCEDURE — 85027 COMPLETE CBC AUTOMATED: CPT

## 2023-11-11 PROCEDURE — 306591 TRAY SUTURE REMOVAL DISP: Performed by: SURGERY

## 2023-11-11 PROCEDURE — P9016 RBC LEUKOCYTES REDUCED: HCPCS

## 2023-11-11 PROCEDURE — 99024 POSTOP FOLLOW-UP VISIT: CPT

## 2023-11-11 PROCEDURE — 80053 COMPREHEN METABOLIC PANEL: CPT

## 2023-11-11 RX ORDER — ENOXAPARIN SODIUM 100 MG/ML
40 INJECTION SUBCUTANEOUS DAILY
Status: ACTIVE | DISCHARGE
Start: 2023-11-12 | End: 2023-11-19

## 2023-11-11 RX ORDER — POTASSIUM CHLORIDE 20 MEQ/1
40 TABLET, EXTENDED RELEASE ORAL 2 TIMES DAILY WITH MEALS
Status: COMPLETED | OUTPATIENT
Start: 2023-11-11 | End: 2023-11-11

## 2023-11-11 RX ORDER — SODIUM CHLORIDE 9 MG/ML
INJECTION, SOLUTION INTRAVENOUS CONTINUOUS
Status: DISCONTINUED | OUTPATIENT
Start: 2023-11-11 | End: 2023-11-11

## 2023-11-11 RX ORDER — OXYCODONE HYDROCHLORIDE 5 MG/1
2.5-5 TABLET ORAL EVERY 4 HOURS PRN
Status: SHIPPED
Start: 2023-11-11 | End: 2023-11-19

## 2023-11-11 RX ORDER — FERROUS SULFATE 325(65) MG
325 TABLET ORAL DAILY
Status: DISCONTINUED | OUTPATIENT
Start: 2023-11-11 | End: 2023-11-21 | Stop reason: HOSPADM

## 2023-11-11 RX ADMIN — ENOXAPARIN SODIUM 40 MG: 100 INJECTION SUBCUTANEOUS at 10:25

## 2023-11-11 RX ADMIN — GABAPENTIN 300 MG: 300 CAPSULE ORAL at 06:51

## 2023-11-11 RX ADMIN — LEVOTHYROXINE SODIUM 25 MCG: 0.03 TABLET ORAL at 18:04

## 2023-11-11 RX ADMIN — ACETAMINOPHEN 650 MG: 325 TABLET, FILM COATED ORAL at 23:37

## 2023-11-11 RX ADMIN — GABAPENTIN 300 MG: 300 CAPSULE ORAL at 11:27

## 2023-11-11 RX ADMIN — GABAPENTIN 300 MG: 300 CAPSULE ORAL at 18:02

## 2023-11-11 RX ADMIN — FAMOTIDINE 20 MG: 20 TABLET ORAL at 06:51

## 2023-11-11 RX ADMIN — ATORVASTATIN CALCIUM 20 MG: 20 TABLET, FILM COATED ORAL at 18:03

## 2023-11-11 RX ADMIN — Medication 2000 UNITS: at 06:51

## 2023-11-11 RX ADMIN — OXYCODONE 5 MG: 5 TABLET ORAL at 12:18

## 2023-11-11 RX ADMIN — GABAPENTIN 300 MG: 300 CAPSULE ORAL at 23:37

## 2023-11-11 RX ADMIN — ACETAMINOPHEN 650 MG: 325 TABLET, FILM COATED ORAL at 18:02

## 2023-11-11 RX ADMIN — ACETAMINOPHEN 650 MG: 325 TABLET, FILM COATED ORAL at 06:51

## 2023-11-11 RX ADMIN — POTASSIUM CHLORIDE 40 MEQ: 1500 TABLET, EXTENDED RELEASE ORAL at 08:30

## 2023-11-11 RX ADMIN — POTASSIUM CHLORIDE 40 MEQ: 1500 TABLET, EXTENDED RELEASE ORAL at 18:02

## 2023-11-11 RX ADMIN — FERROUS SULFATE TAB 325 MG (65 MG ELEMENTAL FE) 325 MG: 325 (65 FE) TAB at 11:28

## 2023-11-11 RX ADMIN — ACETAMINOPHEN 650 MG: 325 TABLET, FILM COATED ORAL at 11:27

## 2023-11-11 RX ADMIN — SODIUM CHLORIDE: 9 INJECTION, SOLUTION INTRAVENOUS at 11:29

## 2023-11-11 RX ADMIN — FAMOTIDINE 20 MG: 20 TABLET ORAL at 18:03

## 2023-11-11 RX ADMIN — LIDOCAINE HYDROCHLORIDE 20 ML: 20 INJECTION, SOLUTION INFILTRATION; PERINEURAL at 13:52

## 2023-11-11 ASSESSMENT — PATIENT HEALTH QUESTIONNAIRE - PHQ9
1. LITTLE INTEREST OR PLEASURE IN DOING THINGS: NOT AT ALL
2. FEELING DOWN, DEPRESSED, IRRITABLE, OR HOPELESS: NOT AT ALL
SUM OF ALL RESPONSES TO PHQ9 QUESTIONS 1 AND 2: 0

## 2023-11-11 NOTE — FACE TO FACE
Face to Face Note  -  Durable Medical Equipment    YESICA Stevenson - NPI: [id number]  I certify that this patient is under my care and that they have had a durable medical equipment(DME)face to face encounter by myself that meets the physician DME face-to-face encounter requirements with this patient on:    Date of encounter:   Patient:                    MRN:                       YOB: 2025  Rubi Haq  [id number]  1945     The encounter with the patient was in whole, or in part, for the following medical condition, which is the primary reason for durable medical equipment:  Post-Op Surgery    I certify that, based on my findings, the following durable medical equipment is medically necessary:  Wound Vac.        ------------------------------------------------------------------------------------------------------------------    Face to Face Supporting Documentation - Home Health    The encounter with this patient was in whole or in part the primary reason for home health admission.    Date of encounter:   Patient:                    MRN:                       YOB: 2025  Rubi Haq  [id number]  1945     Home health to see patient for:  Skilled Nursing care for assessment, interventions & education, Wound Care, and Occupational therapy evaluation and treatment    Skilled need for:  Surgical Aftercare midline abdominal incision wound vac care    Skilled nursing interventions to include:  Wound Care and Comment: nursing assessment    Homebound evidenced status by:  Needs the assistance of another person in order to leave the home. Leaving home must require a considerable and taxing effort. There must exist a normal inability to leave the home.    Community Physician to provide follow up care: Sheng Negro M.D. (Inactive)     Optional Interventions    Wound information & treatment:    Home Infusion Therapy orders:     Line/Drain/Airway:    I certify the face to face encounter for this home care referral meets the CMS requirements and the encounter/clinical assessment with the patient was, in whole, or in part, for the medical condition(s) listed above, which is the primary reason for home health care. Based on my clinical findings: the service(s) are medically necessary, support the need for home health care, and the homebound criteria are met.  I certify that this patient has had a face to face encounter by myself.  Kalyani Pate A.P.R.N. - NPI: [id number]    *Debility, frailty and advanced age in the absence of an acute deterioration or exacerbation of a condition do not qualify a patient for home health.

## 2023-11-11 NOTE — CARE PLAN
The patient is Stable - Low risk of patient condition declining or worsening    Shift Goals  Clinical Goals: skin integrity, void  Patient Goals: rest  Family Goals: Updates    Progress made toward(s) clinical / shift goals:  pt unsure if she was able to urinate or not. Bladder scan performed after bathroom trip. Evening bladder scan was 533 ml. Straight cath performed. Pt up to bathroom mult times for BM today. Believes she urinated sometimes during these trips but not for sure.     Patient is not progressing towards the following goals:

## 2023-11-11 NOTE — WOUND TEAM
[facility]  Inpatient Services  Wound and Skin Care Follow-up    Admission Date: 11/5/2023     Last order of IP CONSULT TO WOUND CARE was found on 11/11/2023 from Hospital Encounter on 11/5/2023     HPI, PMH, SH: Reviewed    Past Surgical History:   Procedure Laterality Date    WA EXPLORATORY OF ABDOMEN N/A 11/7/2023    Procedure: LAPAROTOMY, EXPLORATORY 2ND LOOK;  Surgeon: Tobi Locke M.D.;  Location: SURGERY [facility];  Service: General    WOUND CLOSURE NEURO N/A 11/7/2023    Procedure: CLOSURE, WOUND;  Surgeon: Tobi Locke M.D.;  Location: SURGERY [facility];  Service: General    BOWEL RESECTION  11/7/2023    Procedure: EXCISION, INTESTINE;  Surgeon: Tobi Locke M.D.;  Location: [facility];  Service: General    WA EXPLORATORY OF ABDOMEN  11/5/2023    Procedure: LAPAROTOMY, EXPLORATORY,  SMALL BOWEL RESECTION vac greater than 50cm, abthera placement;  Surgeon: Doroteo York D.O.;  Location: [facility];  Service: Gen Robotic    FUSION, SPINE, LUMBAR, PLIF  6/10/2019    Procedure: FUSION, SPINE, LUMBAR, TLIF L4-5;  Surgeon: Amado Marquez M.D.;  Location: [facility];  Service: Neurosurgery    LUMBAR LAMINECTOMY DISKECTOMY  6/10/2019    Procedure: LAMINECTOMY, SPINE, LUMBAR, WITH DISCECTOMY- REDO;  Surgeon: Amado Marquez M.D.;  Location: [facility];  Service: Neurosurgery    OTHER  2018    glaucoma surgery right and left eye    OTHER  2015    yag laser surgery right eye    OTHER  2014    mohs basal cell nasal    OTHER  2014    basal cell nasal    GYN SURGERY  2011    hysterectomy    OTHER ORTHOPEDIC SURGERY  2010    right thumb    OTHER ORTHOPEDIC SURGERY  2010    left thumb    OTHER  2010    right iol    OTHER ORTHOPEDIC SURGERY  2007    spine    OTHER ORTHOPEDIC SURGERY  2007    dural leak    OTHER ORTHOPEDIC SURGERY  2002    carpel tunnel    GYN SURGERY  1983    c sec     Social History     Tobacco Use    Smoking status: Never     Smokeless tobacco: Never   Substance Use Topics    Alcohol use: Yes     Comment: 1 daily     No chief complaint on file.    Diagnosis: Small bowel obstruction (HCC) [K56.609]    Unit where seen by Wound Team: [id number]/02     WOUND FOLLOW UP RELATED TO:  Abd NPWT change       WOUND TEAM PLAN OF CARE - Frequency of Follow-up:   Nursing to follow dressing orders written for wound care. Contact wound team if area fails to progress, deteriorates or with any questions/concerns if something comes up before next scheduled follow up (See below as to whether wound is following and frequency of wound follow up)  Dressing changes by wound team:                   NPWT change 3 times weekly - Abd NPWT change, currently on Tu/Th/Sat change schedule. Will attempt to switch patient back to [facility] schedule this upcoming week.    WOUND HISTORY:       Pt is an 78yr old female admitted with history of GYN Malignancy status post DAQUAN and lymphadenectomy who has had small bowel obstruction in the past. Pt had abdominal surgery 1.5 years prior for lysis of adhesions. Pt presented to [facility] 1 day prior to admission at [facility] and CT revealed small bowel obstruction. Pt was transferred to [facility] for higher level of care and was noted to have elevated WBC, as well as elevated lactic acid and creatinine in combination with concerning abdominal exam. Pt was therefore taken to OR on 11/5/23 for Ex lap and abthera placement. Pt returned to OR on 11/7/23 for fascia closure and black foam wound vac application. Wound team was subsequently consulted to manage NPWT.       WOUND ASSESSMENT/LDA  Wound 11/05/23 Full Thickness Wound Open Incision Abdomen (Active)   Date First Assessed/Time First Assessed: 11/05/23 1801   Primary Wound Type: Full Thickness Wound  Surgical Wound Type: Open Incision  Location: Abdomen      Assessments 11/11/2023  3:00 PM   Site Assessment Pink;Red;Yellow   Periwound Assessment Clean;Dry;Intact   Margins Defined  edges;Unattached edges   Closure Secondary intention   Drainage Amount Scant   Drainage Description Serosanguineous   Treatments Cleansed;Topical Lidocaine   Wound Cleansing Foam Cleanser/Washcloth   Periwound Protectant Skin Protectant Wipes to Periwound;Drape   Dressing Status Clean;Dry;Intact   Dressing Changed Changed   Dressing Cleansing/Solutions Not Applicable   Dressing Options Wound Vac   Dressing Change/Treatment Frequency Tuesday, Thursday, Saturday, and As Needed   NEXT Dressing Change/Treatment Date 11/14/23   NEXT Weekly Photo (Inpatient Only) 11/16/23   Wound Team Following 3x Weekly   Non-staged Wound Description Full thickness   Shape Linear   Wound Odor None   WOUND NURSE ONLY - Time Spent with Patient (mins) 45       Negative Pressure Wound Therapy 11/05/23 Surgical Abdomen (Active)   Placement Date/Time: 11/05/23 1802   Inserted by: OR  Wound Type: Surgical  Location: Abdomen      Assessments 11/11/2023  3:00 PM   NPWT Pump Mode / Pressure Setting Ulta;Continuous;125 mmHg   Dressing Type Medium;Black Foam (Regular)   Number of Foam Pieces Used 2   Canister Changed No   NEXT Dressing Change/Treatment Date 11/13/23        Vascular:    BARB:   No results found.    Lab Values:    Lab Results   Component Value Date/Time    WBC 12.9 (H) 11/11/2023 01:00 PM    RBC 2.93 (L) 11/11/2023 01:00 PM    HEMOGLOBIN 9.1 (L) 11/11/2023 01:00 PM    HEMATOCRIT 27.5 (L) 11/11/2023 01:00 PM         Culture Results show:  No results found for this or any previous visit (from the past 720 hour(s)).    Pain Level/Medicated:  2% Lidocaine allowed to dwell on wound bed 15min prior and PO pain medications administered by bedside RN 60min prior       INTERVENTIONS BY WOUND TEAM:  Chart and images reviewed. Discussed with bedside RN. All areas of concern (based on picture review, LDA review and discussion with bedside RN) have been thoroughly assessed. Documentation of areas based on significant findings. This RN in to assess  patient. Performed standard wound care which includes appropriate positioning, dressing removal and non-selective debridement. Pictures and measurements obtained weekly if/when required.    Wound:  Abd  Preparation for Dressing removal: Dressing soaked with Lidocaine and Dressing soaked with wound cleanser  Cleansed/Non-selectively Debrided with:  Wound cleanser and Gauze  Shira wound: Cleansed with Wound cleanser and Gauze, Prepped with No Sting and Drape  Primary Dressing:  One piece of half thickness black foam applied into wound bed and secured with drape.  Secondary (Outer) Dressing: Second piece of foam applied as a button, then Trac pad applied. Suction resumed at 125mmHg, no leaks detected.    Advanced Wound Care Discharge Planning  Number of Clinicians necessary to complete wound care: 1  Is patient requiring IV pain medications for dressing changes:  No   Length of time for dressing change 30 min. (This does not include chart review, pre-medication time, set up, clean up or time spent charting.)    Interdisciplinary consultation: Patient, Bedside RN (Iliana)    EVALUATION / RATIONALE FOR TREATMENT:     Date:  11/11/23  Wound Status:  Wound progressing as expected  Less adipose tissue exposed today, tissue still appears easily friable. May benefit from transition to Veraflo NPWT as wound base becomes more robust if patient remains in-house (a veraflo kit and cassette are at bedside).  Plan is working on transferring back to [facility].    Date:  11/09/23  Wound Status:  Initial evaluation  Pt with fresh abdominal incision. Minimal granulation tissue noted that this point. Regular NPWT applied to assist in granular tissue development and wound closure.         Goals: Steady decrease in wound area and depth weekly.    NURSING PLAN OF CARE ORDERS:  No new orders this visit    NUTRITION RECOMMENDATIONS   Wound Team Recommendations:  N/A     DIET ORDERS (From admission to next 24h)       Start      Ordered    11/10/23 1641  Diet Order Diet: Regular  ALL MEALS        Question:  Diet:  Answer:  Regular    11/10/23 1640    11/10/23 1012  Supplements  ALL MEALS        Question:  Which Supplement  Answer:  OTHER (see comments)  Comment:  Ensure Clear    11/10/23 1012                    PREVENTATIVE INTERVENTIONS:   Q shift Juan Antonio - performed per nursing policy  Q shift pressure point assessments - performed per nursing policy    Surface/Positioning  Standard/trauma mattress - Currently in Place    Anticipated discharge plans:  TBD, but will need outpatient follow-up for ongoing advanced wound care needs.         Vac Discharge Needs:  Vac Discharge plan is purely a recommendation from wound team and not a requirement for discharge unless otherwise stated by physician.  Regular Vac in use and continued at discharge

## 2023-11-11 NOTE — CARE PLAN
The patient is Stable - Low risk of patient condition declining or worsening    Shift Goals  Clinical Goals: Mobility; Bowel Function; Skin Integrity  Patient Goals: Bowel Function; Comfort  Family Goals: Updates    Progress made toward(s) clinical / shift goals:  Patient medicated per MAR. Non-pharmacologic comfort measures implemented. Safety discussed. Education provided. Ambulation and repositioning encouraged.     Problem: Knowledge Deficit - Standard  Goal: Patient and family/care givers will demonstrate understanding of plan of care, disease process/condition, diagnostic tests and medications  Outcome: Progressing     Problem: Fluid Volume  Goal: Fluid volume balance will be maintained  Outcome: Progressing     Problem: Pain - Standard  Goal: Alleviation of pain or a reduction in pain to the patient’s comfort goal  Outcome: Progressing     Problem: Skin Integrity  Goal: Skin integrity is maintained or improved  Outcome: Progressing

## 2023-11-11 NOTE — DISCHARGE INSTRUCTIONS
Post Operative Discharge Instructions:    1. DIET: Upon discharge from the hospital, you may resume your normal preoperative diet, unless specifically directed otherwise. Depending on how you are feeling and whether you have nausea or not, you may wish to stay with a bland diet for the first few days. However, you can advance this as quickly as you feel ready.    2. ACTIVITIES: After discharge from the hospital, you may resume full routine activities; however, there should be no heavy lifting (greater than 20 pounds or a bag of groceries) and no strenuous activities for at least 2 weeks. The duration may be longer, depending on your surgical procedure. Routine activities of daily living are acceptable. Activity level should be addressed at your post-op follow up appointment.    3. DRIVING: You may drive whenever you are off pain medications and are able to perform the activities needed to drive, i.e., turning, bending, twisting, etc.    4. BATHING: You may get the wound wet at any time after leaving the hospital. You may shower, but do not submerge in a bath for at least two weeks.  If you have wound dressings, they may come off after 48 hours.  If you have skin glue to the wound, this will fall off on its own, do not pick at it.  If you have Steri-Strips to the wound, these will fall off on their own, do not pick at them. You may trim the edges if needed.    5. BOWEL FUNCTION:   After surgery, it is not uncommon for patients to develop either frequent or loose stools after meals. This usually corrects itself after a few days, to a few weeks. If this occurs, do not worry; this will resolve on its own.  Constipation is much more common than loose stools. The cause is the combination of pain medication and decreased activity level and possibly the nature of the surgical procedure performed. If you feel this is occurring, you may use an over-the-counter treatment such as MiraLAX (or Milk of Magnesia, Ex-Lax,  Senokot, etc.) until the problem has resolved. Drink plenty of water and try to wean off narcotic pain medications as soon as is comfortable for you.    6. PAIN MEDICATION:   You will be given a prescription for pain medication at discharge. Please take these as directed. It is important to remember not to take medications on an empty stomach as this may cause nausea.  You may also take over the counter acetaminophen and/or NSAIDS (ibuprofen, Aleve, Advil, Motrin) per the package instructions.  You may also use ice to the wound to decrease pain and swelling. You may alternate 20 minutes on and 20 minutes off with the ice for the first 24-48 hours. Make sure you place a washcloth or towel between the ice pack and your skin.  Please note that narcotic pain medication cannot be refilled unless you are seen by a doctor. Make sure you call the office if you are running low on medication or if the dose you have been prescribed is not working well for you.    7.CALL THE [facility] AT [phone] IF YOU HAVE:  (1) Fevers to more than 101F, (2) Unusual chest or leg pain, (3) Drainage or fluid from incision that may be foul smelling, increased tenderness or soreness at the wound or the wound edges are no longer together, redness or swelling at the incision site. Do not hesitate to call with any other questions.

## 2023-11-11 NOTE — DISCHARGE PLANNING
Case Management Discharge Planning      Per [facility], patient is medically cleared for DC & will need HHC & Home wound VAC.    HH/Wound VAC set up vs transfer back to [facility] discussed with Kalyani who will contact [facility] to initiate transfer back.      **1440 Hrs - Transfer back to [facility] initiated; Pending acceptance/bed availability.    **1605 Hrs - Transfer packet started & hand off given to ED CM via [employer].

## 2023-11-11 NOTE — PROGRESS NOTES
0915 pt unsure if she voided while up to the bathroom. Bladder scan amount 187 ml    1600 bladder scan 533 ml  1645 straight cath result 540 ml yellow urine

## 2023-11-11 NOTE — DISCHARGE SUMMARY
DISCHARGE  SUMMARY    DATE OF ADMISSION: 11/5/2023    DATE OF DISCHARGE: 11/21/2023    DISCHARGE DIAGNOSIS:  Principal Problem:    Small bowel obstruction (HCC)  Active Problems:    Leukocytosis    Acute deep vein thrombosis (DVT) (HCC)    Anemia    Urinary retention    Hypothyroid    History of endometrial cancer    HLD (hyperlipidemia)    No contraindication to deep vein thrombosis (DVT) prophylaxis  Resolved Problems:    Bilateral leg edema    Sepsis (HCC)    Elevated serum creatinine    Respiratory failure following trauma and surgery (HCC)    CONSULTATIONS: None    PROCEDURES:  11/5/23 Exploratory laparotomy, small bowel resection, ABThera placement by Dr. Doroteo York  11/7/23 Exploratory laparotomy, small bowel resection, single with primary anastomosis, negative pressure dressing application by Dr. Tobi Locke.    BRIEF HPI and HOSPITAL COURSE:  Rubi Haq is a 78 y.o. female who presented to with abdominal pain, nausea, & vomiting to the [facility] Emergency Department.  An abdominal pelvis CT was obtained at the referring facility and showed a small bowel obstruction. She was transferred to [facility] for general surgery evaluation. Upon arrival to the emergency department she was found to have septic shock for which vasopressor support and antimicrobial therapy was initiated. She was taken to the operating room where she underwent exploration and small bowel resection. Intraoperatively, she was found to have dense adhesive bands with a volvulus and small bowel necrosis. She was left in discontinuity as well as mechanical ventilated and admitted to the surgical intensive care unit postoperatively. While in the intensive care unit she continued to receive vasopressor support, crystalloid resuscitation, and antimicrobial therapy. She was stabilized and returned to the operating room on hospital day two for a planned secondary look laparotomy where she underwent an  additional small bowel resection with primary anastomosis and fascial closure. She was subsequently extubated and transferred to the general surgical gabriel where she completed her course of antimicrobial therapy. While on the general surgical gabriel she was evaluated by physical therapy and occupational therapy who recommended discharge home with home health. Home health wound care arrangements were confirmed and the patient was discharge home with [facility].    On day of discharge, the patient was tolerating room air and a regular diet. She had no pain. Her abdominal exam was benign. Her wound vac was intact to her midline abdominal incision. The patient and daughter acknowledged the importance of outpatient follow up with her primary care provider. She was discharge home with [facility], a wound vac, and outpatient follow up as discussed below.    DISPOSITION:   Discharged home with home health on 11/21/23    DISCHARGE MEDICATIONS:  The patients controlled substance history was reviewed and a controlled substance use informed consent (if applicable) was provided by [facility] and the patient has been prescribed.     Medication List        START taking these medications        Instructions   tamsulosin 0.4 MG capsule  Start taking on: November 22, 2023  Commonly known as: Flomax   Take 1 Capsule by mouth 1/2 hour after breakfast for 14 days.  Dose: 0.4 mg            CONTINUE taking these medications        Instructions   acetaminophen 500 MG Tabs  Commonly known as: Tylenol   Take 500-1,000 mg by mouth every 6 hours as needed.  Dose: 500-1,000 mg     acyclovir 200 MG Caps  Commonly known as: Zovirax   Take 800 mg by mouth 2 times a day.  Dose: 800 mg     atorvastatin 20 MG Tabs  Commonly known as: Lipitor   Take 20 mg by mouth every evening.  Dose: 20 mg     docusate sodium 100 MG Caps  Commonly known as: Colace   Take 100 mg by mouth 2 times a day as needed for  Constipation.  Dose: 100 mg     ferrous sulfate 325 (65 Fe) MG tablet   Take 325 mg by mouth every day.  Dose: 325 mg     gabapentin 300 MG Caps  Commonly known as: Neurontin   Take 300 mg by mouth 4 times a day.  Dose: 300 mg     levothyroxine 25 MCG Tabs  Commonly known as: Synthroid   Take 25 mcg by mouth every evening.  Dose: 25 mcg     therapeutic multivitamin-minerals Tabs   Take 1 Tab by mouth every day.  Dose: 1 Tablet     tizanidine 2 MG tablet  Commonly known as: Zanaflex   Take 1 Tab by mouth 3 times a day as needed.  Dose: 2 mg     Vitamin D3 2000 UNIT Caps   Take 1 Cap by mouth every day.  Dose: 1 Capsule            STOP taking these medications      dicyclomine 20 MG Tabs  Commonly known as: Bentyl     naproxen 500 MG Tabs  Commonly known as: Naprosyn     ondansetron 8 MG Tbdp  Commonly known as: Zofran ODT     polyethylene glycol/lytes Pack  Commonly known as: Miralax     promethazine 25 MG Tabs  Commonly known as: Phenergan     senna-docusate 8.6-50 MG Tabs  Commonly known as: Pericolace Or Senokot S     simvastatin 40 MG Tabs  Commonly known as: Zocor            You will be given a prescription for pain medication at discharge. Please take these as directed. It is important to remember not to take medications on an empty stomach as this may cause nausea.  You may also take over the counter acetaminophen and/or NSAIDS (ibuprofen, Aleve, Advil, Motrin) per the package instructions.  You may also use ice to the wound to decrease pain and swelling. You may alternate 20 minutes on and 20 minutes off with the ice for the first 24-48 hours. Make sure you place a washcloth or towel between the ice pack and your skin.  Please note that narcotic pain medication cannot be refilled unless you are seen by a doctor. Make sure you call the office if you are running low on medication or if the dose you have been prescribed is not working well for you.    ACTIVITY:  After discharge from the hospital, you may resume  full routine activities; however, there should be no heavy lifting (greater than 20 pounds or a bag of groceries) and no strenuous activities for at least 2 weeks. The duration may be longer, depending on your surgical procedure. Routine activities of daily living are acceptable. Activity level should be addressed at your post-op follow up appointment. You may drive whenever you are off pain medications and are able to perform the activities needed to drive, i.e., turning, bending, twisting, etc.    WOUND CARE:  Continuous wound vac to midline abdominal incision.    DIET:  Upon discharge from the hospital, you may resume your normal preoperative diet, unless specifically directed otherwise. Depending on how you are feeling and whether you have nausea or not, you may wish to stay with a bland diet for the first few days. However, you can advance this as quickly as you feel ready.    FOLLOW UP:  [facility]  [id number]  [place] [address]  [phone]    Schedule an appointment as soon as possible for a visit  Follow up with [facility] in 2 weeks for a postoperative visit and midline abdominal incision wound check.    Primary Care Provider    Follow up  Follow up with your primary care provider to discuss your recent hospitalization and anemia.    [address] [address] [phone]        Call the office if you have: (1) Fevers to more than 101F, (2) Unusual chest or leg pain, (3) Drainage or fluid from incision that may be foul smelling, increased tenderness or soreness at the wound or the wound edges are no longer together, redness or swelling at the incision site. Do not hesitate to call with any other questions.    TIME SPENT ON DISCHARGE: 39 minutes      ____________________________________________  LIBORIO Stevenson.

## 2023-11-11 NOTE — PROGRESS NOTES
"  DATE: 11/11/2023    Hospital Day 6  small bowel obstruction & volvulus .    Postoperative Day # 6 Exploratory laparotomy, small bowel resection, & ABThera Placement.    Postoperative Day # 4 Exploratory laparotomy, small bowel resection, primary anastomosis, & negative pressure dressing application.    INTERVAL EVENTS:  Hemoglobin trend down to 6.9, 1 unit PRBCs ordered.  WBC remains normalized after Zosyn completed yesterday.  Tolerating regular diet with bowel movements, denies melena.    PHYSICAL EXAMINATION:  Vital Signs: /52   Pulse 86   Temp 37.2 °C (99 °F) (Temporal)   Resp 18   Ht 1.626 m (5' 4\")   Wt 69.4 kg (153 lb)   SpO2 91%     Alert & oriented, afebrile, no acute distress.  Unlabored respirations tolerating room air.   Abdomen soft & non distended.  Minimal incisional tenderness.   Midline abdominal incision with vac intact & functioning.  Tolerating regular diet, no nausea or vomiting.   + flatus   Last BM 11/10    LABORATORY VALUES:  Recent Labs     11/09/23  0500 11/10/23  0600 11/11/23  0642   WBC 13.3* 10.2 10.5   RBC 2.29* 2.37* 2.22*   HEMOGLOBIN 7.1* 7.4* 6.9*   HEMATOCRIT 22.1* 22.7* 21.3*   MCV 96.5 95.8 95.9   MCH 31.0 31.2 31.1   MCHC 32.1* 32.6 32.4   RDW 50.1* 49.0 49.1   PLATELETCT 201 248 236   MPV 9.2 9.1 9.2     Recent Labs     11/09/23  0500 11/10/23  0600 11/11/23  0642   SODIUM 143 140 142   POTASSIUM 3.2* 3.4* 3.1*   CHLORIDE 111 109 113*   CO2 21 21 21   GLUCOSE 88 86 94   BUN 26* 20 13   CREATININE 0.81 0.75 0.67   CALCIUM 7.9* 7.7* 7.2*     Recent Labs     11/09/23  0500 11/10/23  0600 11/11/23  0642   ASTSGOT 23 19 19   ALTSGPT 17 14 13   TBILIRUBIN <0.2 0.2 <0.2   ALKPHOSPHAT 63 64 57   GLOBULIN 2.2 2.1 2.0            IMAGING:  DX-CHEST-PORTABLE (1 VIEW)   Final Result         1.  Left basilar atelectasis, no focal infiltrate      DX-CHEST-PORTABLE (1 VIEW)   Final Result         1.  Left retrocardiac density suggesting subtle infiltrate.      DX-CHEST-PORTABLE " (1 VIEW)   Final Result         1.  Left basilar atelectasis or early infiltrate.      EC-ECHOCARDIOGRAM COMPLETE W/ CONT   Final Result      DX-CHEST-PORTABLE (1 VIEW)   Final Result         1.  Left basilar atelectasis or early infiltrate.      DX-ABDOMEN FOR TUBE PLACEMENT   Final Result      1.  Enteric tube overlies the proximal stomach.      DX-CHEST-PORTABLE (1 VIEW)   Final Result      1.  There is no acute cardiopulmonary process.   2.  Satisfactory appearance of the tubes and lines.      US-EXTREMITY VENOUS LOWER BILAT   Final Result      OUTSIDE IMAGES-CT ABDOMEN /PELVIS   Final Result      OUTSIDE IMAGES-DX CHEST   Final Result      DX-CHEST-PORTABLE (1 VIEW)   Final Result      1.  There is no acute cardiopulmonary process.        ASSESSMENT AND PLAN:  * Small bowel obstruction (HCC)- (present on admission)  Assessment & Plan  11/5 Emergent Exploratory laparotomy with bowel resection.   Left in discontinuity / ABThera  11/7 Additional small bowel resected at ileum, and proximal anastomosis, fascial closure.    11/10 Zosyn day 4 of 4 from source control and closure    Acute deep vein thrombosis (DVT) (HCC)- (present on admission)  Assessment & Plan  11/5 Acute/subacute occlusive thrombus in the left posterior tibial vein.    11/8 Pharmacological DVT prophylaxis, Lovenox 40 mg Q day initiated.   11/12 Follow up trauma duplex.      Anemia  Assessment & Plan  On oral iron supplementation outpatient.  11/8 Iron studies low, replacement ordered per pharmacy.  11/11 Hemglobin 6.9, 1 unit packed red cells transfused.  Trend hemograms and transfuse packed red cells for hemoglobin less than 7.0    No contraindication to deep vein thrombosis (DVT) prophylaxis- (present on admission)  Assessment & Plan  11/8 Pharmacological DVT prophylaxis, Lovenox initiated     HLD (hyperlipidemia)- (present on admission)  Assessment & Plan  Chronic condition treated with atorvastatin.  Holding maintenance medication during acute  septic illness.  11/10 atorvastatin resumed.    Hypothyroid- (present on admission)  Assessment & Plan  Chronic condition treated with levothyroxine.  Holding maintenance medication during acute septic illness.  11/10 Resumed levothyroxine.      - Transfuse 1 unit packed red cells with post transfusion CBC  - Repeat CBC in the AM  - Replete serum potassium with K Dur, repeat BMP in the AM  - Disposition: PT recommends discharge home once medically clear. Plan to discharge home once hemoglobin stable.        ____________________________________     LIBORIO Stevenson.

## 2023-11-11 NOTE — PROGRESS NOTES
"Received report from previous shift RN at 1900.  Assessment complete.  A&O x 4. Patient calls appropriately.  Patient ambulates with x1 assist and FWW.   Patient has 0/10 pain. No interventions for pain implemented at this time, will continue to monitor.  Denies N&V. Tolerating regular diet.  Skin per flowsheets.  + void, + flatus, + BM on 11/10.  Patient denies SOB on room air.  /53   Pulse 83   Temp 36.9 °C (98.4 °F) (Temporal)   Resp 17   Ht 1.626 m (5' 4\")   Wt 69.4 kg (153 lb)   SpO2 94%   BMI 26.26 kg/m²     Patient pleasant and cooperative throughout assessment.  Reviewed plan of care with patient, pt verbalizes understanding. Call light and personal belongings with in reach. Hourly rounding in place. All needs met at this time.    "

## 2023-11-12 ENCOUNTER — APPOINTMENT (OUTPATIENT)
Dept: RADIOLOGY | Facility: MEDICAL CENTER | Age: 78
DRG: 853 | End: 2023-11-12
Payer: MEDICARE

## 2023-11-12 PROBLEM — D72.829 LEUKOCYTOSIS: Status: ACTIVE | Noted: 2023-11-12

## 2023-11-12 LAB
ALBUMIN SERPL BCP-MCNC: 2 G/DL (ref 3.2–4.9)
ALBUMIN/GLOB SERPL: 0.8 G/DL
ALP SERPL-CCNC: 73 U/L (ref 30–99)
ALT SERPL-CCNC: 12 U/L (ref 2–50)
ANION GAP SERPL CALC-SCNC: 9 MMOL/L (ref 7–16)
AST SERPL-CCNC: 20 U/L (ref 12–45)
BASOPHILS # BLD AUTO: 0.3 % (ref 0–1.8)
BASOPHILS # BLD: 0.04 K/UL (ref 0–0.12)
BILIRUB SERPL-MCNC: 0.2 MG/DL (ref 0.1–1.5)
BUN SERPL-MCNC: 11 MG/DL (ref 8–22)
C DIFF DNA SPEC QL NAA+PROBE: NEGATIVE
C DIFF TOX GENS STL QL NAA+PROBE: NEGATIVE
CALCIUM ALBUM COR SERPL-MCNC: 9.5 MG/DL (ref 8.5–10.5)
CALCIUM SERPL-MCNC: 7.9 MG/DL (ref 8.5–10.5)
CHLORIDE SERPL-SCNC: 115 MMOL/L (ref 96–112)
CO2 SERPL-SCNC: 19 MMOL/L (ref 20–33)
CREAT SERPL-MCNC: 0.64 MG/DL (ref 0.5–1.4)
EOSINOPHIL # BLD AUTO: 0.51 K/UL (ref 0–0.51)
EOSINOPHIL NFR BLD: 3.6 % (ref 0–6.9)
ERYTHROCYTE [DISTWIDTH] IN BLOOD BY AUTOMATED COUNT: 49.7 FL (ref 35.9–50)
GFR SERPLBLD CREATININE-BSD FMLA CKD-EPI: 89 ML/MIN/1.73 M 2
GLOBULIN SER CALC-MCNC: 2.5 G/DL (ref 1.9–3.5)
GLUCOSE SERPL-MCNC: 94 MG/DL (ref 65–99)
HCT VFR BLD AUTO: 26.2 % (ref 37–47)
HGB BLD-MCNC: 8.9 G/DL (ref 12–16)
IMM GRANULOCYTES # BLD AUTO: 0.36 K/UL (ref 0–0.11)
IMM GRANULOCYTES NFR BLD AUTO: 2.5 % (ref 0–0.9)
LYMPHOCYTES # BLD AUTO: 1.18 K/UL (ref 1–4.8)
LYMPHOCYTES NFR BLD: 8.3 % (ref 22–41)
MCH RBC QN AUTO: 32.2 PG (ref 27–33)
MCHC RBC AUTO-ENTMCNC: 34 G/DL (ref 32.2–35.5)
MCV RBC AUTO: 94.9 FL (ref 81.4–97.8)
MONOCYTES # BLD AUTO: 0.72 K/UL (ref 0–0.85)
MONOCYTES NFR BLD AUTO: 5 % (ref 0–13.4)
NEUTROPHILS # BLD AUTO: 11.45 K/UL (ref 1.82–7.42)
NEUTROPHILS NFR BLD: 80.3 % (ref 44–72)
NRBC # BLD AUTO: 0 K/UL
NRBC BLD-RTO: 0 /100 WBC (ref 0–0.2)
PLATELET # BLD AUTO: 280 K/UL (ref 164–446)
PMV BLD AUTO: 9.1 FL (ref 9–12.9)
POTASSIUM SERPL-SCNC: 4 MMOL/L (ref 3.6–5.5)
PROT SERPL-MCNC: 4.5 G/DL (ref 6–8.2)
RBC # BLD AUTO: 2.76 M/UL (ref 4.2–5.4)
SODIUM SERPL-SCNC: 143 MMOL/L (ref 135–145)
WBC # BLD AUTO: 14.3 K/UL (ref 4.8–10.8)

## 2023-11-12 PROCEDURE — 80053 COMPREHEN METABOLIC PANEL: CPT

## 2023-11-12 PROCEDURE — A9270 NON-COVERED ITEM OR SERVICE: HCPCS | Performed by: SURGERY

## 2023-11-12 PROCEDURE — 700102 HCHG RX REV CODE 250 W/ 637 OVERRIDE(OP): Performed by: SURGERY

## 2023-11-12 PROCEDURE — 93970 EXTREMITY STUDY: CPT

## 2023-11-12 PROCEDURE — 71045 X-RAY EXAM CHEST 1 VIEW: CPT

## 2023-11-12 PROCEDURE — 99024 POSTOP FOLLOW-UP VISIT: CPT

## 2023-11-12 PROCEDURE — 700102 HCHG RX REV CODE 250 W/ 637 OVERRIDE(OP)

## 2023-11-12 PROCEDURE — 770001 HCHG ROOM/CARE - MED/SURG/GYN PRIV*

## 2023-11-12 PROCEDURE — 85025 COMPLETE CBC W/AUTO DIFF WBC: CPT

## 2023-11-12 PROCEDURE — 87493 C DIFF AMPLIFIED PROBE: CPT

## 2023-11-12 PROCEDURE — A9270 NON-COVERED ITEM OR SERVICE: HCPCS

## 2023-11-12 PROCEDURE — 81001 URINALYSIS AUTO W/SCOPE: CPT

## 2023-11-12 PROCEDURE — 51798 US URINE CAPACITY MEASURE: CPT

## 2023-11-12 RX ADMIN — GABAPENTIN 300 MG: 300 CAPSULE ORAL at 23:56

## 2023-11-12 RX ADMIN — OXYCODONE 5 MG: 5 TABLET ORAL at 20:18

## 2023-11-12 RX ADMIN — GABAPENTIN 300 MG: 300 CAPSULE ORAL at 04:27

## 2023-11-12 RX ADMIN — ATORVASTATIN CALCIUM 20 MG: 20 TABLET, FILM COATED ORAL at 16:50

## 2023-11-12 RX ADMIN — ACETAMINOPHEN 650 MG: 325 TABLET, FILM COATED ORAL at 23:56

## 2023-11-12 RX ADMIN — ACETAMINOPHEN 650 MG: 325 TABLET, FILM COATED ORAL at 13:55

## 2023-11-12 RX ADMIN — FERROUS SULFATE TAB 325 MG (65 MG ELEMENTAL FE) 325 MG: 325 (65 FE) TAB at 04:27

## 2023-11-12 RX ADMIN — ACETAMINOPHEN 650 MG: 325 TABLET, FILM COATED ORAL at 04:27

## 2023-11-12 RX ADMIN — GABAPENTIN 300 MG: 300 CAPSULE ORAL at 13:56

## 2023-11-12 RX ADMIN — ACETAMINOPHEN 650 MG: 325 TABLET, FILM COATED ORAL at 18:13

## 2023-11-12 RX ADMIN — Medication 2000 UNITS: at 04:27

## 2023-11-12 RX ADMIN — FAMOTIDINE 20 MG: 20 TABLET ORAL at 04:27

## 2023-11-12 RX ADMIN — LEVOTHYROXINE SODIUM 25 MCG: 0.03 TABLET ORAL at 16:51

## 2023-11-12 RX ADMIN — GABAPENTIN 300 MG: 300 CAPSULE ORAL at 18:14

## 2023-11-12 RX ADMIN — FAMOTIDINE 20 MG: 20 TABLET ORAL at 16:51

## 2023-11-12 NOTE — CARE PLAN
The patient is Stable - Low risk of patient condition declining or worsening    Shift Goals  Clinical Goals: mobility, blood transfusion, skin integrity  Patient Goals: rest, apin control  Family Goals: Updates    Progress made toward(s) clinical / shift goals:    Problem: Knowledge Deficit - Standard  Goal: Patient and family/care givers will demonstrate understanding of plan of care, disease process/condition, diagnostic tests and medications  Outcome: Progressing     Problem: Hemodynamics  Goal: Patient's hemodynamics, fluid balance and neurologic status will be stable or improve  Outcome: Progressing

## 2023-11-12 NOTE — ASSESSMENT & PLAN NOTE
11/12 WBC trend up to 14.9  - Chest xray with increased left effusion and atelectasis   - Bilateral lower extremity duplex, urinalysis, C diff stool sample negative  11/13 WBC continue to trend up  - Swelling to left upper extremity, ultrasound with superficial thrombus  - CT chest / abdomen / pelvis with bilateral pleural effusions and atelectasis  11/14 WBC slight trend down  - Empiric vanco and cefepime initiated & blood cultures obtained  - MRSA swab negative, vanco discontinued  - Repeat UA negative  11/15 COVID & influenza negative  11/16 Left breast ultrasound unremarkable.  11/18 WBC normalized, transition to Augmentin.  11/19 Final blood cultures negative.  11/20 Augmentin completed.

## 2023-11-12 NOTE — PROGRESS NOTES
"Received report from previous shift RN at 1900.  Assessment complete.  A&O x 4. Patient calls appropriately.  Patient ambulates with x1 assist and FWW.   Patient has 2/10 pain. Pain managed with prescribed medications per MAR.  Denies N&V. Tolerating regular diet.  Skin per flowsheets.  + void, + flatus, + BM on 11/11.  Patient denies SOB on room air.  SCD's refused.  /65   Pulse 97   Temp 36.9 °C (98.4 °F) (Temporal)   Resp 18   Ht 1.626 m (5' 4\")   Wt 69.4 kg (153 lb)   SpO2 93%   BMI 26.26 kg/m²     Patient pleasant and cooperative throughout assessment.  Reviewed plan of care with patient, pt verbalizes understanding. Call light and personal belongings with in reach. Hourly rounding in place. All needs met at this time.    "

## 2023-11-12 NOTE — PROGRESS NOTES
Pt reporting distension in bladder. Bladder scan of 692mL. Pt requested to attempt voiding in toilet. @nd bladder scan post-void shows 411. Pt straight cathed per protocol with 500mL out. Educated patient that if 10am bladder scan >400, ackerman catheter would be needed. Pt agreeable with this plan.

## 2023-11-12 NOTE — CARE PLAN
The patient is Stable - Low risk of patient condition declining or worsening    Shift Goals  Clinical Goals: Monitor Labs; [place] Management; Rest  Patient Goals: Rest  Family Goals: Updates    Progress made toward(s) clinical / shift goals:  Patient medicated per MAR. Non-pharmacologic comfort measures implemented. Safety discussed. Education provided. Ambulation and repositioning encouraged.     Problem: Knowledge Deficit - Standard  Goal: Patient and family/care givers will demonstrate understanding of plan of care, disease process/condition, diagnostic tests and medications  Outcome: Progressing     Problem: Pain - Standard  Goal: Alleviation of pain or a reduction in pain to the patient’s comfort goal  Outcome: Progressing     Problem: Skin Integrity  Goal: Skin integrity is maintained or improved  Outcome: Progressing

## 2023-11-12 NOTE — PROGRESS NOTES
"  DATE: 11/12/2023    Hospital Day 7  small bowel obstruction & volvulus .    Postoperative Day # 7 Exploratory laparotomy, small bowel resection, & ABThera Placement.    Postoperative Day # 5 Exploratory laparotomy, small bowel resection, primary anastomosis, & negative pressure dressing application.    INTERVAL EVENTS:  WBC trend up to 14.3 this AM after Zosyn completion on 11/10.  Afebrile & clinically non toxic.  Continues to regular diet with reported diarrhea.  Urinary retention over the last 24 hours requiring straight catheterization.    Hemoglobin trend up to 8.9 after 1 unit packed red cells transfused yesterday.  Outpatient oral iron supplementation resumed, continue to trend daily CBCs.    - Check chest xray  - Check bilateral lower extremity duplex  - Check urinalysis  - Rule out Cdiff  - Continue ferrous sulfate, repeat CBC in the AM  - Disposition: PT recommends discharge home once medically clear. Plan to discharge home with home health wound care and wound vac once medically clear. Of note, patient does have a transfer agreement with [facility] and bed availability is at capacity. Consider possible transfer versus discharge pending leukocytosis work up.    PHYSICAL EXAMINATION:  Vital Signs: /67   Pulse 83   Temp 37.1 °C (98.8 °F) (Temporal)   Resp 18   Ht 1.626 m (5' 4\")   Wt 69.4 kg (153 lb)   SpO2 95%     Alert & oriented, afebrile, no acute distress.  Unlabored respirations tolerating room air.   Abdomen soft & non distended.  Minimal incisional tenderness.   Midline abdominal incision with vac intact & functioning.  Tolerating regular diet, no nausea or vomiting.   + flatus   Last BM 11/12    LABORATORY VALUES:  Recent Labs     11/11/23  0642 11/11/23  1300 11/12/23  0447   WBC 10.5 12.9* 14.3*   RBC 2.22* 2.93* 2.76*   HEMOGLOBIN 6.9* 9.1* 8.9*   HEMATOCRIT 21.3* 27.5* 26.2*   MCV 95.9 93.9 94.9   MCH 31.1 31.1 32.2   MCHC 32.4 33.1 34.0   RDW 49.1 46.9 49.7 "   PLATELETCT 236 301 280   MPV 9.2 9.2 9.1     Recent Labs     11/10/23  0600 11/11/23  0642 11/12/23  0447   SODIUM 140 142 143   POTASSIUM 3.4* 3.1* 4.0   CHLORIDE 109 113* 115*   CO2 21 21 19*   GLUCOSE 86 94 94   BUN 20 13 11   CREATININE 0.75 0.67 0.64   CALCIUM 7.7* 7.2* 7.9*     Recent Labs     11/10/23  0600 11/11/23  0642 11/12/23  0447   ASTSGOT 19 19 20   ALTSGPT 14 13 12   TBILIRUBIN 0.2 <0.2 0.2   ALKPHOSPHAT 64 57 73   GLOBULIN 2.1 2.0 2.5            IMAGING:  DX-CHEST-PORTABLE (1 VIEW)   Final Result      Increased left pleural effusion with superimposed left basilar airspace disease versus atelectasis.      DX-CHEST-PORTABLE (1 VIEW)   Final Result         1.  Left basilar atelectasis, no focal infiltrate      DX-CHEST-PORTABLE (1 VIEW)   Final Result         1.  Left retrocardiac density suggesting subtle infiltrate.      DX-CHEST-PORTABLE (1 VIEW)   Final Result         1.  Left basilar atelectasis or early infiltrate.      EC-ECHOCARDIOGRAM COMPLETE W/ CONT   Final Result      DX-CHEST-PORTABLE (1 VIEW)   Final Result         1.  Left basilar atelectasis or early infiltrate.      DX-ABDOMEN FOR TUBE PLACEMENT   Final Result      1.  Enteric tube overlies the proximal stomach.      DX-CHEST-PORTABLE (1 VIEW)   Final Result      1.  There is no acute cardiopulmonary process.   2.  Satisfactory appearance of the tubes and lines.      US-EXTREMITY VENOUS LOWER BILAT   Final Result      OUTSIDE IMAGES-CT ABDOMEN /PELVIS   Final Result      OUTSIDE IMAGES-DX CHEST   Final Result      DX-CHEST-PORTABLE (1 VIEW)   Final Result      1.  There is no acute cardiopulmonary process.      US-TRAUMA VEIN SCREEN LOWER BILAT EXTREMITY    (Results Pending)     ASSESSMENT AND PLAN:  * Small bowel obstruction (HCC)- (present on admission)  Assessment & Plan  11/5 Emergent Exploratory laparotomy with bowel resection.   Left in discontinuity / ABThera  11/7 Additional small bowel resected at ileum, and proximal  anastomosis, fascial closure.    11/10 Zosyn day 4 of 4 from source control and closure.    Leukocytosis- (present on admission)  Assessment & Plan  11/12 WBC trend up to 14.9  - Chest xray pending  - Bilateral lower extremity duplex pending  - Urinalysis pending  - C diff stool sample pending  Trend lab studies    Anemia  Assessment & Plan  On oral iron supplementation outpatient.  11/8 Iron studies low, replacement ordered per pharmacy.  11/11 Hemglobin 6.9, 1 unit packed red cells transfused. Outpatient ferrous sulfate resumed.  Trend hemograms and transfuse packed red cells for hemoglobin less than 7.0    Acute deep vein thrombosis (DVT) (HCC)- (present on admission)  Assessment & Plan  11/5 Acute/subacute occlusive thrombus in the left posterior tibial vein.    11/8 Pharmacological DVT prophylaxis, Lovenox 40 mg Q day initiated.   11/12 Follow up bilateral lower extremity duplex pending.    No contraindication to deep vein thrombosis (DVT) prophylaxis- (present on admission)  Assessment & Plan  11/8 Pharmacological DVT prophylaxis, Lovenox initiated     HLD (hyperlipidemia)- (present on admission)  Assessment & Plan  Chronic condition treated with atorvastatin.  Holding maintenance medication during acute septic illness.  11/10 atorvastatin resumed.    Hypothyroid- (present on admission)  Assessment & Plan  Chronic condition treated with levothyroxine.  Holding maintenance medication during acute septic illness.  11/10 Resumed levothyroxine.         ____________________________________     YESICA Stevenson

## 2023-11-13 ENCOUNTER — APPOINTMENT (OUTPATIENT)
Dept: RADIOLOGY | Facility: MEDICAL CENTER | Age: 78
DRG: 853 | End: 2023-11-13
Attending: NURSE PRACTITIONER
Payer: MEDICARE

## 2023-11-13 PROBLEM — R33.9 URINARY RETENTION: Status: ACTIVE | Noted: 2023-11-13

## 2023-11-13 LAB
ANION GAP SERPL CALC-SCNC: 10 MMOL/L (ref 7–16)
ANION GAP SERPL CALC-SCNC: 10 MMOL/L (ref 7–16)
APPEARANCE UR: CLEAR
BACTERIA #/AREA URNS HPF: NEGATIVE /HPF
BASOPHILS # BLD AUTO: 0.2 % (ref 0–1.8)
BASOPHILS # BLD: 0.04 K/UL (ref 0–0.12)
BILIRUB UR QL STRIP.AUTO: NEGATIVE
BUN SERPL-MCNC: 10 MG/DL (ref 8–22)
BUN SERPL-MCNC: 11 MG/DL (ref 8–22)
CALCIUM SERPL-MCNC: 7.7 MG/DL (ref 8.5–10.5)
CALCIUM SERPL-MCNC: 7.8 MG/DL (ref 8.5–10.5)
CHLORIDE SERPL-SCNC: 109 MMOL/L (ref 96–112)
CHLORIDE SERPL-SCNC: 110 MMOL/L (ref 96–112)
CO2 SERPL-SCNC: 18 MMOL/L (ref 20–33)
CO2 SERPL-SCNC: 19 MMOL/L (ref 20–33)
COLOR UR: YELLOW
CREAT SERPL-MCNC: 0.6 MG/DL (ref 0.5–1.4)
CREAT SERPL-MCNC: 0.64 MG/DL (ref 0.5–1.4)
EOSINOPHIL # BLD AUTO: 0.4 K/UL (ref 0–0.51)
EOSINOPHIL NFR BLD: 2.2 % (ref 0–6.9)
EPI CELLS #/AREA URNS HPF: NEGATIVE /HPF
ERYTHROCYTE [DISTWIDTH] IN BLOOD BY AUTOMATED COUNT: 50.4 FL (ref 35.9–50)
GFR SERPLBLD CREATININE-BSD FMLA CKD-EPI: 89 ML/MIN/1.73 M 2
GFR SERPLBLD CREATININE-BSD FMLA CKD-EPI: 90 ML/MIN/1.73 M 2
GLUCOSE SERPL-MCNC: 102 MG/DL (ref 65–99)
GLUCOSE SERPL-MCNC: 93 MG/DL (ref 65–99)
GLUCOSE UR STRIP.AUTO-MCNC: NEGATIVE MG/DL
HCT VFR BLD AUTO: 25.5 % (ref 37–47)
HGB BLD-MCNC: 8.2 G/DL (ref 12–16)
HYALINE CASTS #/AREA URNS LPF: ABNORMAL /LPF
IMM GRANULOCYTES # BLD AUTO: 0.3 K/UL (ref 0–0.11)
IMM GRANULOCYTES NFR BLD AUTO: 1.7 % (ref 0–0.9)
KETONES UR STRIP.AUTO-MCNC: NEGATIVE MG/DL
LEUKOCYTE ESTERASE UR QL STRIP.AUTO: ABNORMAL
LYMPHOCYTES # BLD AUTO: 1.03 K/UL (ref 1–4.8)
LYMPHOCYTES NFR BLD: 5.7 % (ref 22–41)
MAGNESIUM SERPL-MCNC: 1.4 MG/DL (ref 1.5–2.5)
MCH RBC QN AUTO: 31.3 PG (ref 27–33)
MCHC RBC AUTO-ENTMCNC: 32.2 G/DL (ref 32.2–35.5)
MCV RBC AUTO: 97.3 FL (ref 81.4–97.8)
MICRO URNS: ABNORMAL
MONOCYTES # BLD AUTO: 0.7 K/UL (ref 0–0.85)
MONOCYTES NFR BLD AUTO: 3.9 % (ref 0–13.4)
NEUTROPHILS # BLD AUTO: 15.46 K/UL (ref 1.82–7.42)
NEUTROPHILS NFR BLD: 86.3 % (ref 44–72)
NITRITE UR QL STRIP.AUTO: NEGATIVE
NRBC # BLD AUTO: 0 K/UL
NRBC BLD-RTO: 0 /100 WBC (ref 0–0.2)
PH UR STRIP.AUTO: 6 [PH] (ref 5–8)
PHOSPHATE SERPL-MCNC: 2.4 MG/DL (ref 2.5–4.5)
PLATELET # BLD AUTO: 295 K/UL (ref 164–446)
PMV BLD AUTO: 9.1 FL (ref 9–12.9)
POTASSIUM SERPL-SCNC: 3.7 MMOL/L (ref 3.6–5.5)
POTASSIUM SERPL-SCNC: 3.9 MMOL/L (ref 3.6–5.5)
PROT UR QL STRIP: NEGATIVE MG/DL
RBC # BLD AUTO: 2.62 M/UL (ref 4.2–5.4)
RBC # URNS HPF: ABNORMAL /HPF
RBC UR QL AUTO: NEGATIVE
SODIUM SERPL-SCNC: 138 MMOL/L (ref 135–145)
SODIUM SERPL-SCNC: 138 MMOL/L (ref 135–145)
SP GR UR STRIP.AUTO: 1.01
UROBILINOGEN UR STRIP.AUTO-MCNC: 0.2 MG/DL
WBC # BLD AUTO: 17.9 K/UL (ref 4.8–10.8)
WBC #/AREA URNS HPF: ABNORMAL /HPF

## 2023-11-13 PROCEDURE — 770001 HCHG ROOM/CARE - MED/SURG/GYN PRIV*

## 2023-11-13 PROCEDURE — A9270 NON-COVERED ITEM OR SERVICE: HCPCS | Performed by: SURGERY

## 2023-11-13 PROCEDURE — 84100 ASSAY OF PHOSPHORUS: CPT

## 2023-11-13 PROCEDURE — 99024 POSTOP FOLLOW-UP VISIT: CPT | Performed by: NURSE PRACTITIONER

## 2023-11-13 PROCEDURE — 51798 US URINE CAPACITY MEASURE: CPT

## 2023-11-13 PROCEDURE — 97602 WOUND(S) CARE NON-SELECTIVE: CPT

## 2023-11-13 PROCEDURE — A9270 NON-COVERED ITEM OR SERVICE: HCPCS | Performed by: NURSE PRACTITIONER

## 2023-11-13 PROCEDURE — 700117 HCHG RX CONTRAST REV CODE 255: Performed by: NURSE PRACTITIONER

## 2023-11-13 PROCEDURE — A9270 NON-COVERED ITEM OR SERVICE: HCPCS

## 2023-11-13 PROCEDURE — 700111 HCHG RX REV CODE 636 W/ 250 OVERRIDE (IP): Mod: JZ | Performed by: SURGERY

## 2023-11-13 PROCEDURE — 93971 EXTREMITY STUDY: CPT | Mod: LT

## 2023-11-13 PROCEDURE — 700111 HCHG RX REV CODE 636 W/ 250 OVERRIDE (IP): Mod: JZ | Performed by: NURSE PRACTITIONER

## 2023-11-13 PROCEDURE — 700102 HCHG RX REV CODE 250 W/ 637 OVERRIDE(OP): Performed by: NURSE PRACTITIONER

## 2023-11-13 PROCEDURE — 85025 COMPLETE CBC W/AUTO DIFF WBC: CPT

## 2023-11-13 PROCEDURE — 80048 BASIC METABOLIC PNL TOTAL CA: CPT | Mod: 91

## 2023-11-13 PROCEDURE — 700102 HCHG RX REV CODE 250 W/ 637 OVERRIDE(OP)

## 2023-11-13 PROCEDURE — 700101 HCHG RX REV CODE 250: Performed by: SURGERY

## 2023-11-13 PROCEDURE — 700105 HCHG RX REV CODE 258: Performed by: NURSE PRACTITIONER

## 2023-11-13 PROCEDURE — 71260 CT THORAX DX C+: CPT

## 2023-11-13 PROCEDURE — 97605 NEG PRS WND THER DME<=50SQCM: CPT

## 2023-11-13 PROCEDURE — 94760 N-INVAS EAR/PLS OXIMETRY 1: CPT

## 2023-11-13 PROCEDURE — 94669 MECHANICAL CHEST WALL OSCILL: CPT

## 2023-11-13 PROCEDURE — 36415 COLL VENOUS BLD VENIPUNCTURE: CPT

## 2023-11-13 PROCEDURE — 83735 ASSAY OF MAGNESIUM: CPT

## 2023-11-13 PROCEDURE — 700102 HCHG RX REV CODE 250 W/ 637 OVERRIDE(OP): Performed by: SURGERY

## 2023-11-13 PROCEDURE — 700101 HCHG RX REV CODE 250: Performed by: NURSE PRACTITIONER

## 2023-11-13 RX ORDER — TAMSULOSIN HYDROCHLORIDE 0.4 MG/1
0.4 CAPSULE ORAL
Status: DISCONTINUED | OUTPATIENT
Start: 2023-11-13 | End: 2023-11-21 | Stop reason: HOSPADM

## 2023-11-13 RX ORDER — MAGNESIUM SULFATE HEPTAHYDRATE 40 MG/ML
2 INJECTION, SOLUTION INTRAVENOUS ONCE
Status: COMPLETED | OUTPATIENT
Start: 2023-11-13 | End: 2023-11-13

## 2023-11-13 RX ADMIN — Medication 2000 UNITS: at 04:06

## 2023-11-13 RX ADMIN — GABAPENTIN 300 MG: 300 CAPSULE ORAL at 04:06

## 2023-11-13 RX ADMIN — FAMOTIDINE 20 MG: 20 TABLET ORAL at 16:53

## 2023-11-13 RX ADMIN — GABAPENTIN 300 MG: 300 CAPSULE ORAL at 11:43

## 2023-11-13 RX ADMIN — TAMSULOSIN HYDROCHLORIDE 0.4 MG: 0.4 CAPSULE ORAL at 11:05

## 2023-11-13 RX ADMIN — GABAPENTIN 300 MG: 300 CAPSULE ORAL at 16:53

## 2023-11-13 RX ADMIN — MAGNESIUM SULFATE HEPTAHYDRATE 2 G: 2 INJECTION, SOLUTION INTRAVENOUS at 09:16

## 2023-11-13 RX ADMIN — ACETAMINOPHEN 650 MG: 325 TABLET, FILM COATED ORAL at 04:06

## 2023-11-13 RX ADMIN — POTASSIUM PHOSPHATE, MONOBASIC POTASSIUM PHOSPHATE, DIBASIC 30 MMOL: 224; 236 INJECTION, SOLUTION, CONCENTRATE INTRAVENOUS at 11:07

## 2023-11-13 RX ADMIN — FERROUS SULFATE TAB 325 MG (65 MG ELEMENTAL FE) 325 MG: 325 (65 FE) TAB at 04:06

## 2023-11-13 RX ADMIN — LIDOCAINE HYDROCHLORIDE 1 APPLICATION: 40 SOLUTION TOPICAL at 09:48

## 2023-11-13 RX ADMIN — IOHEXOL 100 ML: 350 INJECTION, SOLUTION INTRAVENOUS at 15:54

## 2023-11-13 RX ADMIN — ATORVASTATIN CALCIUM 20 MG: 20 TABLET, FILM COATED ORAL at 16:53

## 2023-11-13 RX ADMIN — LEVOTHYROXINE SODIUM 25 MCG: 0.03 TABLET ORAL at 16:53

## 2023-11-13 RX ADMIN — ENOXAPARIN SODIUM 40 MG: 100 INJECTION SUBCUTANEOUS at 07:54

## 2023-11-13 RX ADMIN — GABAPENTIN 300 MG: 300 CAPSULE ORAL at 23:31

## 2023-11-13 RX ADMIN — ACETAMINOPHEN 650 MG: 325 TABLET, FILM COATED ORAL at 19:52

## 2023-11-13 RX ADMIN — OXYCODONE 5 MG: 5 TABLET ORAL at 09:56

## 2023-11-13 RX ADMIN — FAMOTIDINE 20 MG: 20 TABLET ORAL at 04:06

## 2023-11-13 ASSESSMENT — COPD QUESTIONNAIRES
DURING THE PAST 4 WEEKS HOW MUCH DID YOU FEEL SHORT OF BREATH: NONE/LITTLE OF THE TIME
DO YOU EVER COUGH UP ANY MUCUS OR PHLEGM?: NO/ONLY WITH OCCASIONAL COLDS OR INFECTIONS
HAVE YOU SMOKED AT LEAST 100 CIGARETTES IN YOUR ENTIRE LIFE: NO/DON'T KNOW
COPD SCREENING SCORE: 2

## 2023-11-13 NOTE — CARE PLAN
The patient is Stable - Low risk of patient condition declining or worsening    Shift Goals  Clinical Goals: monitor labs,wound vac, rest, stable vitals  Patient Goals: rest, pain control  Family Goals: Updates    Progress made toward(s) clinical / shift goals:    Problem: Knowledge Deficit - Standard  Goal: Patient and family/care givers will demonstrate understanding of plan of care, disease process/condition, diagnostic tests and medications  Outcome: Progressing     Problem: Hemodynamics  Goal: Patient's hemodynamics, fluid balance and neurologic status will be stable or improve  Outcome: Progressing

## 2023-11-13 NOTE — PROGRESS NOTES
Pt is oriented x 4 without complaints.  Arshad placed over night for retention of 693ml. Vac in place.  Call light within reach.

## 2023-11-13 NOTE — PROGRESS NOTES
"  DATE: 11/13/2023    Hospital Day 8  small bowel obstruction & volvulus .     Postoperative Day # 8 Exploratory laparotomy, small bowel resection, & ABThera Placement.     Postoperative Day # 6 Exploratory laparotomy, small bowel resection, primary anastomosis, & negative pressure dressing application.    INTERVAL EVENTS:  WBC trend up  New swelling to left upper extremity  Arshad placed overnight for retention  UA / lower extremity duplex / c-diff negative    - CT chest / abdomen / pelvis today  - Ultrasound left upper extremity  - Initiate flomax  - Magnesium / phosphorus replaced  - Continue wound care     PHYSICAL EXAMINATION:  Vital Signs: Blood Pressure 124/60   Pulse 91   Temperature 37.5 °C (99.5 °F) (Temporal)   Respiration 18   Height 1.626 m (5' 4\")   Weight 69.4 kg (153 lb)   Oxygen Saturation 95%     Physical Exam  Vitals and nursing note reviewed.   Constitutional:       General: She is not in acute distress.     Appearance: She is not toxic-appearing.   HENT:      Mouth/Throat:      Mouth: Mucous membranes are moist.      Pharynx: Oropharynx is clear.   Cardiovascular:      Rate and Rhythm: Normal rate and regular rhythm.      Pulses: Normal pulses.   Pulmonary:      Effort: Pulmonary effort is normal.   Chest:      Chest wall: No tenderness.   Abdominal:      General: There is no distension.      Palpations: Abdomen is soft.      Tenderness: There is no abdominal tenderness.      Comments: Midline incision with wound VAC in place   Musculoskeletal:      Comments: Unilateral swelling of left upper extremity   Neurological:      Mental Status: She is alert.           LABORATORY VALUES:  Recent Labs     11/11/23  1300 11/12/23  0447 11/13/23  0558   WBC 12.9* 14.3* 17.9*   RBC 2.93* 2.76* 2.62*   HEMOGLOBIN 9.1* 8.9* 8.2*   HEMATOCRIT 27.5* 26.2* 25.5*   MCV 93.9 94.9 97.3   MCH 31.1 32.2 31.3   MCHC 33.1 34.0 32.2   RDW 46.9 49.7 50.4*   PLATELETCT 301 280 295   MPV 9.2 9.1 9.1     Recent Labs    "  11/11/23  0642 11/12/23  0447 11/13/23  0558   SODIUM 142 143 138   POTASSIUM 3.1* 4.0 3.9   CHLORIDE 113* 115* 110   CO2 21 19* 18*   GLUCOSE 94 94 102*   BUN 13 11 11   CREATININE 0.67 0.64 0.60   CALCIUM 7.2* 7.9* 7.7*     Recent Labs     11/11/23  0642 11/12/23  0447   ASTSGOT 19 20   ALTSGPT 13 12   TBILIRUBIN <0.2 0.2   ALKPHOSPHAT 57 73   GLOBULIN 2.0 2.5            IMAGING:  US-TRAUMA VEIN SCREEN LOWER BILAT EXTREMITY   Final Result      DX-CHEST-PORTABLE (1 VIEW)   Final Result      Increased left pleural effusion with superimposed left basilar airspace disease versus atelectasis.      DX-CHEST-PORTABLE (1 VIEW)   Final Result         1.  Left basilar atelectasis, no focal infiltrate      DX-CHEST-PORTABLE (1 VIEW)   Final Result         1.  Left retrocardiac density suggesting subtle infiltrate.      DX-CHEST-PORTABLE (1 VIEW)   Final Result         1.  Left basilar atelectasis or early infiltrate.      EC-ECHOCARDIOGRAM COMPLETE W/ CONT   Final Result      DX-CHEST-PORTABLE (1 VIEW)   Final Result         1.  Left basilar atelectasis or early infiltrate.      DX-ABDOMEN FOR TUBE PLACEMENT   Final Result      1.  Enteric tube overlies the proximal stomach.      DX-CHEST-PORTABLE (1 VIEW)   Final Result      1.  There is no acute cardiopulmonary process.   2.  Satisfactory appearance of the tubes and lines.      US-EXTREMITY VENOUS LOWER BILAT   Final Result      OUTSIDE IMAGES-CT ABDOMEN /PELVIS   Final Result      OUTSIDE IMAGES-DX CHEST   Final Result      DX-CHEST-PORTABLE (1 VIEW)   Final Result      1.  There is no acute cardiopulmonary process.      CT-CHEST,ABDOMEN,PELVIS WITH    (Results Pending)   US-EXTREMITY VENOUS UPPER UNILAT LEFT    (Results Pending)       ASSESSMENT AND PLAN:  * Small bowel obstruction (HCC)- (present on admission)  Assessment & Plan  11/5 Emergent Exploratory laparotomy with bowel resection.   Left in discontinuity / ABThera  11/7 Additional small bowel resected at ileum,  and proximal anastomosis, fascial closure.    11/10 Zosyn day 4 of 4 from source control and closure.     Leukocytosis- (present on admission)  Assessment & Plan  11/12 WBC trend up to 14.9  - Chest xray with increased left effusion and atelectasis   - Bilateral lower extremity duplex, urinalysis, C diff stool sample negative  11/13 WBC continue to trend up  - Swelling to left upper extremity, ultrasound pending  - CT chest / abdomen / pelvis pending     Urinary retention  Assessment & Plan  11/13 Arshad placed overnight for retention  - Add Flomax     Anemia  Assessment & Plan  On oral iron supplementation outpatient.  11/8 Iron studies low, replacement ordered per pharmacy.  11/11 Hemglobin 6.9, 1 unit packed red cells transfused. Outpatient ferrous sulfate resumed.  Trend hemograms and transfuse packed red cells for hemoglobin less than 7.0    Acute deep vein thrombosis (DVT) (HCC)- (present on admission)  Assessment & Plan  11/5 Acute/subacute occlusive thrombus in the left posterior tibial vein.    11/8 Pharmacological DVT prophylaxis, Lovenox 40 mg Q day initiated.   11/12 Trauma bilateral lower extremity duplex negative for DVT.    No contraindication to deep vein thrombosis (DVT) prophylaxis- (present on admission)  Assessment & Plan  11/8 Pharmacological DVT prophylaxis, Lovenox initiated     HLD (hyperlipidemia)- (present on admission)  Assessment & Plan  Chronic condition treated with atorvastatin.  Holding maintenance medication during acute septic illness.  11/10 atorvastatin resumed.    Hypothyroid- (present on admission)  Assessment & Plan  Chronic condition treated with levothyroxine.  Holding maintenance medication during acute septic illness.  11/10 Resumed levothyroxine.           ____________________________________     YESICA Ontiveros    DD: 11/13/2023  10:01 AM

## 2023-11-13 NOTE — DIETARY
Nutrition Update:    Day 8 of admit.  Rubi Haq is an 78 y.o. female with admitting DX of Small bowel obstruction.    Patient being followed to optimize nutrition.    Current Diet: Regular    Problem: Nutritional:  Goal: Achieve adequate nutritional intake  Description: Diet to advance past NPO/Clear liquids and patient will consume 50% of meals  Outcome: Met    Diet advanced to Regular. PO has been >50% of meals.    RD to follow per dept guidelines.

## 2023-11-13 NOTE — DISCHARGE PLANNING
Case Management Discharge Planning    Admission Date: 11/5/2023  GMLOS: 9.9  ALOS: 8    6-Clicks ADL Score: 18  6-Clicks Mobility Score: 15  PT and/or OT Eval ordered: Yes  Post-acute Referrals Ordered: pending transfer back to [facility]   Post-acute Choice Obtained: No  Has referral(s) been sent to post-acute provider:  No      Anticipated Discharge Dispo: Discharge Disposition: D/T to short term gen hosp for IP care w/ planned IP readmit (82)  Discharge Address: daughter's home [address] [place] [id number]  Discharge Contact Phone Number: correct on face sheet    DME Needed: Order for DME pending transfer back to [facility] vs HH/Wound VAC set up vs transfer back to [facility]     Action(s) Taken: Updated Provider/Nurse on Discharge Plan    Escalations Completed: None    Medically Clear: No    Next Steps: Per IDT rounds, Per Martha (transfer center) she spoke with TJ Sun who reported; pt is not medically ready, elevated WBC's and pt is requiring further work-up; patient is on hold for now. SW informed by Karen of the transfer center; to inform transfer center when patient is medically ready and transfer center will re-open transfer back.     Barriers to Discharge: Medical clearance    Is the patient up for discharge tomorrow: No

## 2023-11-13 NOTE — CARE PLAN
Problem: Knowledge Deficit - Standard  Goal: Patient and family/care givers will demonstrate understanding of plan of care, disease process/condition, diagnostic tests and medications  Outcome: Progressing     Problem: Pain - Standard  Goal: Alleviation of pain or a reduction in pain to the patient’s comfort goal  Outcome: Progressing   The patient is Stable - Low risk of patient condition declining or worsening    Shift Goals  Clinical Goals: CT, duplex, vac change  Patient Goals: comfort and rest  Family Goals: Updates    Progress made toward(s) clinical / shift goals:  pt updated on POC for today.  Medicated for pain.     Patient is not progressing towards the following goals:

## 2023-11-13 NOTE — CARE PLAN
The patient is Stable - Low risk of patient condition declining or worsening    Shift Goals  Clinical Goals: Monitor Labs and [place]; Bladder Function; Skin Integrity  Patient Goals: Edema Management  Family Goals: Updates    Progress made toward(s) clinical / shift goals:  Patient medicated per MAR. Non-pharmacologic comfort measures implemented. Safety discussed. Education provided. Ambulation and repositioning encouraged.     Problem: Knowledge Deficit - Standard  Goal: Patient and family/care givers will demonstrate understanding of plan of care, disease process/condition, diagnostic tests and medications  Outcome: Progressing     Problem: Pain - Standard  Goal: Alleviation of pain or a reduction in pain to the patient’s comfort goal  Outcome: Progressing     Problem: Skin Integrity  Goal: Skin integrity is maintained or improved  Outcome: Progressing

## 2023-11-13 NOTE — PROGRESS NOTES
Pt voiding small amounts when up to bathroom. Serial bladder scans in place, pt straight cathed x2 on 11/12. Pt up to bathroom with 100mL out. Post void residual of 693mL. Rashad catheter inserted per order with 645mL out. Pt declines bladder pressure/distension before Arshad, declines feeling difference after insertion.

## 2023-11-13 NOTE — WOUND TEAM
[facility]  Inpatient Services  Wound and Skin Care Follow-up    Admission Date: 11/5/2023     Last order of IP CONSULT TO WOUND CARE was found on 11/11/2023 from Hospital Encounter on 11/5/2023     HPI, PMH, SH: Reviewed    Past Surgical History:   Procedure Laterality Date    AR EXPLORATORY OF ABDOMEN N/A 11/7/2023    Procedure: LAPAROTOMY, EXPLORATORY 2ND LOOK;  Surgeon: Tobi Locke M.D.;  Location: SURGERY [facility];  Service: General    WOUND CLOSURE NEURO N/A 11/7/2023    Procedure: CLOSURE, WOUND;  Surgeon: Tobi Locke M.D.;  Location: SURGERY [facility];  Service: General    BOWEL RESECTION  11/7/2023    Procedure: EXCISION, INTESTINE;  Surgeon: Tobi Locke M.D.;  Location: [facility];  Service: General    AR EXPLORATORY OF ABDOMEN  11/5/2023    Procedure: LAPAROTOMY, EXPLORATORY,  SMALL BOWEL RESECTION vac greater than 50cm, abthera placement;  Surgeon: Doroteo York D.O.;  Location: [facility];  Service: Gen Robotic    FUSION, SPINE, LUMBAR, PLIF  6/10/2019    Procedure: FUSION, SPINE, LUMBAR, TLIF L4-5;  Surgeon: Amado Marquez M.D.;  Location: [facility];  Service: Neurosurgery    LUMBAR LAMINECTOMY DISKECTOMY  6/10/2019    Procedure: LAMINECTOMY, SPINE, LUMBAR, WITH DISCECTOMY- REDO;  Surgeon: Amado Marquez M.D.;  Location: [facility];  Service: Neurosurgery    OTHER  2018    glaucoma surgery right and left eye    OTHER  2015    yag laser surgery right eye    OTHER  2014    mohs basal cell nasal    OTHER  2014    basal cell nasal    GYN SURGERY  2011    hysterectomy    OTHER ORTHOPEDIC SURGERY  2010    right thumb    OTHER ORTHOPEDIC SURGERY  2010    left thumb    OTHER  2010    right iol    OTHER ORTHOPEDIC SURGERY  2007    spine    OTHER ORTHOPEDIC SURGERY  2007    dural leak    OTHER ORTHOPEDIC SURGERY  2002    carpel tunnel    GYN SURGERY  1983    c sec     Social History     Tobacco Use    Smoking status: Never     Smokeless tobacco: Never   Substance Use Topics    Alcohol use: Yes     Comment: 1 daily     No chief complaint on file.    Diagnosis: Small bowel obstruction (HCC) [K56.609]    Unit where seen by Wound Team: [id number]/[id number]     WOUND FOLLOW UP RELATED TO:  Mid Abdomen       WOUND TEAM PLAN OF CARE - Frequency of Follow-up:   Nursing to follow dressing orders written for wound care. 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 Pt had abdominal surgery 1.5 years prior for lysis of adhesions. Pt presented to [facility] 1 day prior to admission at [facility] and CT revealed small bowel obstruction. Pt was transferred to [facility] for higher level of care and was noted to have elevated WBC, as well as elevated lactic acid and creatinine in combination with concerning abdominal exam. Pt was therefore taken to OR on 11/5/23 for Ex lap and abthera placement. Pt returned to OR on 11/7/23 for fascia closure and black foam wound vac application. Wound team was subsequently consulted to manage NPWT.       WOUND ASSESSMENT/LDA  Wound 11/05/23 Full Thickness Wound Open Incision Abdomen (Active)   Date First Assessed/Time First Assessed: 11/05/23 1801   Primary Wound Type: Full Thickness Wound  Surgical Wound Type: Open Incision  Location: Abdomen      Assessments 11/13/2023 11:30 AM   Wound Image     Site Assessment Red;Drainage   Periwound Assessment Clean;Dry;Intact   Margins Defined edges;Attached edges   Closure Secondary intention   Drainage Amount Small   Drainage Description Serosanguineous    Treatments Cleansed;Nonselective debridement;Site care;Topical Lidocaine   Wound Cleansing Approved Wound Cleanser   Periwound Protectant No-sting Skin Prep;Paste Ring   Dressing Status Clean;Dry;Intact   Dressing Changed Changed   Dressing Cleansing/Solutions Not Applicable   Dressing Options Wound Vac   Dressing Change/Treatment Frequency Monday, Wednesday, Friday, and As Needed   NEXT Dressing Change/Treatment Date 11/15/23   NEXT Weekly Photo (Inpatient Only) 11/15/23   Wound Team Following 3x Weekly   Non-staged Wound Description Full thickness   Shape Linear   Wound Odor None   WOUND NURSE ONLY - Time Spent with Patient (mins) 60       Negative Pressure Wound Therapy 11/05/23 Surgical Abdomen (Active)   Placement Date/Time: 11/05/23 1802   Inserted by: OR  Wound Type: Surgical  Location: Abdomen      Assessments 11/13/2023 11:30 AM   NPWT Pump Mode / Pressure Setting Ulta;Continuous;125 mmHg   Dressing Type Medium;Black Foam (Regular)   Number of Foam Pieces Used 3   Canister Changed No   NEXT Dressing Change/Treatment Date 11/15/23        Vascular:    BARB:   No results found.    Lab Values:    Lab Results   Component Value Date/Time    WBC 17.9 (H) 11/13/2023 05:58 AM    RBC 2.62 (L) 11/13/2023 05:58 AM    HEMOGLOBIN 8.2 (L) 11/13/2023 05:58 AM    HEMATOCRIT 25.5 (L) 11/13/2023 05:58 AM         Culture Results show:  No results found for this or any previous visit (from the past 720 hour(s)).    Pain Level/Medicated:  4% Lidocaine allowed to dwell on wound bed 75min prior and PO pain medications administered by bedside RN 70min prior       INTERVENTIONS BY WOUND TEAM:  Chart and images reviewed. Discussed with bedside RN. All areas of concern (based on picture review, LDA review and discussion with bedside RN) have been thoroughly assessed. Documentation of areas based on significant findings. This RN in to assess patient. Performed standard wound care which includes appropriate positioning, dressing  removal and non-selective debridement. Pictures and measurements obtained weekly if/when required.    Wound:  Mid Abdomen  Preparation for Dressing removal: Removed without difficulty  Cleansed/Non-selectively Debrided with:  Wound cleanser and Gauze  Shira wound: Cleansed with Wound cleanser and Gauze, Prepped with No Sting and Drape  Primary Dressing:  One continuous piece of full thickness black regular foam applied into wound bed, 2nd piece of full thickness black foam applied at distal end to fill remaining depth. All foam secured with drape.  Secondary (Outer) Dressing: A hole was cut in drape and a 3rd piece of half thickness circular black foam applied as a button for trac pad. Trac pad applied and suction resumed. No leaks noted.    Advanced Wound Care Discharge Planning  Number of Clinicians necessary to complete wound care: 1  Is patient requiring IV pain medications for dressing changes:  No   Length of time for dressing change 30 min. (This does not include chart review, pre-medication time, set up, clean up or time spent charting.)    Interdisciplinary consultation: Patient, Bedside RN (Rosalinda), Cielo SCOTT (Wound RN).  Pressure injury and staging reviewed with N/A.    EVALUATION / RATIONALE FOR TREATMENT:     Date:  11/13/23  Wound Status:  Wound progressing as expected    Wound still with significant depth at distal end, however wound is clean. Continued with NPWT.    Date:  11/11/23  Wound Status:  Wound progressing as expected  Less adipose tissue exposed today, tissue still appears easily friable. May benefit from transition to Veraflo NPWT as wound base becomes more robust if patient remains in-house (a veraflo kit and cassette are at bedside).  Plan is working on transferring back to [facility].    Date:  11/09/23  Wound Status:  Initial evaluation  Pt with fresh abdominal incision. Minimal granulation tissue noted that this point. Regular NPWT applied to assist in granular tissue  development and wound closure.         Goals: Steady decrease in wound area and depth weekly.    NURSING PLAN OF CARE ORDERS:  No new orders this visit    NUTRITION RECOMMENDATIONS   Wound Team Recommendations:  N/A     DIET ORDERS (From admission to next 24h)       Start     Ordered    11/10/23 1641  Diet Order Diet: Regular  ALL MEALS        Question:  Diet:  Answer:  Regular    11/10/23 1640    11/10/23 1012  Supplements  ALL MEALS        Question:  Which Supplement  Answer:  OTHER (see comments)  Comment:  Ensure Clear    11/10/23 1012                    Anticipated discharge plans:  Home Health Care and Outpatient Wound Center        Vac Discharge Needs:  Vac Discharge plan is purely a recommendation from wound team and not a requirement for discharge unless otherwise stated by physician.  Regular Vac in use and continued at discharge

## 2023-11-13 NOTE — PROGRESS NOTES
"Received report from previous shift RN at 1900.  Assessment complete.  A&O x 4. Patient calls appropriately.  Patient ambulates with x1 assist and FWW.   Patient has 4/10 pain. Pain managed with prescribed medications per MAR.  Denies N&V. Tolerating regular diet.  Skin per flowsheets.  + void, + flatus, + BM on 11/12.  Patient denies SOB on room air.  SCD's refused.  /57   Pulse 93   Temp 37.2 °C (99 °F) (Temporal)   Resp 18   Ht 1.626 m (5' 4\")   Wt 69.4 kg (153 lb)   SpO2 94%   BMI 26.26 kg/m²     Patient pleasant and cooperative throughout assessment.  Reviewed plan  of care with patient, pt verbalizes understanding. Call light and personal belongings with in reach. Hourly rounding in place. All needs met at this time.    "

## 2023-11-14 LAB
ANION GAP SERPL CALC-SCNC: 8 MMOL/L (ref 7–16)
APPEARANCE UR: CLEAR
BASOPHILS # BLD AUTO: 0.2 % (ref 0–1.8)
BASOPHILS # BLD: 0.03 K/UL (ref 0–0.12)
BILIRUB UR QL STRIP.AUTO: NEGATIVE
BUN SERPL-MCNC: 8 MG/DL (ref 8–22)
CALCIUM SERPL-MCNC: 7.6 MG/DL (ref 8.5–10.5)
CHLORIDE SERPL-SCNC: 105 MMOL/L (ref 96–112)
CO2 SERPL-SCNC: 22 MMOL/L (ref 20–33)
COLOR UR: YELLOW
CREAT SERPL-MCNC: 0.6 MG/DL (ref 0.5–1.4)
EOSINOPHIL # BLD AUTO: 0.31 K/UL (ref 0–0.51)
EOSINOPHIL NFR BLD: 1.8 % (ref 0–6.9)
ERYTHROCYTE [DISTWIDTH] IN BLOOD BY AUTOMATED COUNT: 50.6 FL (ref 35.9–50)
GFR SERPLBLD CREATININE-BSD FMLA CKD-EPI: 90 ML/MIN/1.73 M 2
GLUCOSE SERPL-MCNC: 101 MG/DL (ref 65–99)
GLUCOSE UR STRIP.AUTO-MCNC: NEGATIVE MG/DL
HCT VFR BLD AUTO: 25.6 % (ref 37–47)
HGB BLD-MCNC: 8.5 G/DL (ref 12–16)
IMM GRANULOCYTES # BLD AUTO: 0.25 K/UL (ref 0–0.11)
IMM GRANULOCYTES NFR BLD AUTO: 1.5 % (ref 0–0.9)
KETONES UR STRIP.AUTO-MCNC: NEGATIVE MG/DL
LEUKOCYTE ESTERASE UR QL STRIP.AUTO: NEGATIVE
LYMPHOCYTES # BLD AUTO: 1 K/UL (ref 1–4.8)
LYMPHOCYTES NFR BLD: 5.9 % (ref 22–41)
MCH RBC QN AUTO: 32.6 PG (ref 27–33)
MCHC RBC AUTO-ENTMCNC: 33.2 G/DL (ref 32.2–35.5)
MCV RBC AUTO: 98.1 FL (ref 81.4–97.8)
MICRO URNS: NORMAL
MONOCYTES # BLD AUTO: 0.88 K/UL (ref 0–0.85)
MONOCYTES NFR BLD AUTO: 5.2 % (ref 0–13.4)
NEUTROPHILS # BLD AUTO: 14.5 K/UL (ref 1.82–7.42)
NEUTROPHILS NFR BLD: 85.4 % (ref 44–72)
NITRITE UR QL STRIP.AUTO: NEGATIVE
NRBC # BLD AUTO: 0 K/UL
NRBC BLD-RTO: 0 /100 WBC (ref 0–0.2)
PH UR STRIP.AUTO: 6 [PH] (ref 5–8)
PLATELET # BLD AUTO: 336 K/UL (ref 164–446)
PMV BLD AUTO: 8.8 FL (ref 9–12.9)
POTASSIUM SERPL-SCNC: 4 MMOL/L (ref 3.6–5.5)
PROT UR QL STRIP: NEGATIVE MG/DL
RBC # BLD AUTO: 2.61 M/UL (ref 4.2–5.4)
RBC UR QL AUTO: NEGATIVE
SCCMEC + MECA PNL NOSE NAA+PROBE: NEGATIVE
SODIUM SERPL-SCNC: 135 MMOL/L (ref 135–145)
SP GR UR STRIP.AUTO: 1.02
UROBILINOGEN UR STRIP.AUTO-MCNC: 0.2 MG/DL
WBC # BLD AUTO: 17 K/UL (ref 4.8–10.8)

## 2023-11-14 PROCEDURE — 99232 SBSQ HOSP IP/OBS MODERATE 35: CPT | Mod: 24 | Performed by: NURSE PRACTITIONER

## 2023-11-14 PROCEDURE — 85025 COMPLETE CBC W/AUTO DIFF WBC: CPT

## 2023-11-14 PROCEDURE — 700111 HCHG RX REV CODE 636 W/ 250 OVERRIDE (IP): Mod: JZ | Performed by: SURGERY

## 2023-11-14 PROCEDURE — A9270 NON-COVERED ITEM OR SERVICE: HCPCS | Performed by: SURGERY

## 2023-11-14 PROCEDURE — 94669 MECHANICAL CHEST WALL OSCILL: CPT

## 2023-11-14 PROCEDURE — 770001 HCHG ROOM/CARE - MED/SURG/GYN PRIV*

## 2023-11-14 PROCEDURE — A9270 NON-COVERED ITEM OR SERVICE: HCPCS

## 2023-11-14 PROCEDURE — 700102 HCHG RX REV CODE 250 W/ 637 OVERRIDE(OP): Performed by: NURSE PRACTITIONER

## 2023-11-14 PROCEDURE — 87641 MR-STAPH DNA AMP PROBE: CPT

## 2023-11-14 PROCEDURE — 700102 HCHG RX REV CODE 250 W/ 637 OVERRIDE(OP)

## 2023-11-14 PROCEDURE — 700105 HCHG RX REV CODE 258: Performed by: SURGERY

## 2023-11-14 PROCEDURE — 700102 HCHG RX REV CODE 250 W/ 637 OVERRIDE(OP): Performed by: SURGERY

## 2023-11-14 PROCEDURE — 97116 GAIT TRAINING THERAPY: CPT

## 2023-11-14 PROCEDURE — 36415 COLL VENOUS BLD VENIPUNCTURE: CPT

## 2023-11-14 PROCEDURE — A9270 NON-COVERED ITEM OR SERVICE: HCPCS | Performed by: NURSE PRACTITIONER

## 2023-11-14 PROCEDURE — 81003 URINALYSIS AUTO W/O SCOPE: CPT

## 2023-11-14 PROCEDURE — 80048 BASIC METABOLIC PNL TOTAL CA: CPT

## 2023-11-14 PROCEDURE — 97530 THERAPEUTIC ACTIVITIES: CPT

## 2023-11-14 PROCEDURE — 87040 BLOOD CULTURE FOR BACTERIA: CPT

## 2023-11-14 RX ORDER — SIMETHICONE 125 MG
125 TABLET,CHEWABLE ORAL 3 TIMES DAILY PRN
Status: DISCONTINUED | OUTPATIENT
Start: 2023-11-14 | End: 2023-11-17

## 2023-11-14 RX ADMIN — TAMSULOSIN HYDROCHLORIDE 0.4 MG: 0.4 CAPSULE ORAL at 08:05

## 2023-11-14 RX ADMIN — FAMOTIDINE 20 MG: 20 TABLET ORAL at 05:21

## 2023-11-14 RX ADMIN — LEVOTHYROXINE SODIUM 25 MCG: 0.03 TABLET ORAL at 17:00

## 2023-11-14 RX ADMIN — ENOXAPARIN SODIUM 40 MG: 100 INJECTION SUBCUTANEOUS at 08:05

## 2023-11-14 RX ADMIN — FAMOTIDINE 20 MG: 20 TABLET ORAL at 17:00

## 2023-11-14 RX ADMIN — GABAPENTIN 300 MG: 300 CAPSULE ORAL at 22:58

## 2023-11-14 RX ADMIN — CEFEPIME 2 G: 2 INJECTION, POWDER, FOR SOLUTION INTRAVENOUS at 14:43

## 2023-11-14 RX ADMIN — CEFEPIME 2 G: 2 INJECTION, POWDER, FOR SOLUTION INTRAVENOUS at 08:05

## 2023-11-14 RX ADMIN — FERROUS SULFATE TAB 325 MG (65 MG ELEMENTAL FE) 325 MG: 325 (65 FE) TAB at 05:21

## 2023-11-14 RX ADMIN — OXYCODONE 2.5 MG: 5 TABLET ORAL at 10:39

## 2023-11-14 RX ADMIN — ATORVASTATIN CALCIUM 20 MG: 20 TABLET, FILM COATED ORAL at 17:00

## 2023-11-14 RX ADMIN — ACETAMINOPHEN 650 MG: 325 TABLET, FILM COATED ORAL at 18:43

## 2023-11-14 RX ADMIN — Medication 2000 UNITS: at 05:21

## 2023-11-14 RX ADMIN — VANCOMYCIN HYDROCHLORIDE 1750 MG: 5 INJECTION, POWDER, LYOPHILIZED, FOR SOLUTION INTRAVENOUS at 08:55

## 2023-11-14 RX ADMIN — GABAPENTIN 300 MG: 300 CAPSULE ORAL at 11:43

## 2023-11-14 RX ADMIN — GABAPENTIN 300 MG: 300 CAPSULE ORAL at 17:00

## 2023-11-14 RX ADMIN — CEFEPIME 2 G: 2 INJECTION, POWDER, FOR SOLUTION INTRAVENOUS at 22:50

## 2023-11-14 RX ADMIN — GABAPENTIN 300 MG: 300 CAPSULE ORAL at 05:21

## 2023-11-14 ASSESSMENT — ENCOUNTER SYMPTOMS
FEVER: 0
NAUSEA: 0
COUGH: 0
CHILLS: 0
TINGLING: 0
MYALGIAS: 0
ABDOMINAL PAIN: 1
VOMITING: 0
HEADACHES: 0
SENSORY CHANGE: 0
TREMORS: 0

## 2023-11-14 ASSESSMENT — COGNITIVE AND FUNCTIONAL STATUS - GENERAL
MOVING FROM LYING ON BACK TO SITTING ON SIDE OF FLAT BED: A LITTLE
MOBILITY SCORE: 18
WALKING IN HOSPITAL ROOM: A LITTLE
CLIMB 3 TO 5 STEPS WITH RAILING: A LITTLE
TURNING FROM BACK TO SIDE WHILE IN FLAT BAD: A LITTLE
MOVING TO AND FROM BED TO CHAIR: A LOT
SUGGESTED CMS G CODE MODIFIER MOBILITY: CK

## 2023-11-14 ASSESSMENT — GAIT ASSESSMENTS
DISTANCE (FEET): 150
GAIT LEVEL OF ASSIST: SUPERVISED
ASSISTIVE DEVICE: FRONT WHEEL WALKER
DEVIATION: DECREASED HEEL STRIKE;DECREASED TOE OFF

## 2023-11-14 ASSESSMENT — FIBROSIS 4 INDEX: FIB4 SCORE: 1.374643498070537535

## 2023-11-14 NOTE — PROGRESS NOTES
Report received from Amarilys LEE, assumed care at 0645.  Pt is A0X4, and responds appropriately   Pt declines any SOB, chest pain, new onset of numbness/ tingling  Oxygen >90% on RA  Pt declines pain at this time  Pt has + ackerman in place due to retention. + output noted  Pt has + flatus, + bowel sounds, + BM 11/13  Pt ambulates with a x1 assist and FWW  Pt is tolerating a regular diet, pt denies any nausea/vomiting  Midline w/ wound vac CDI  Plan of care discussed, all questions answered. Explained importance of calling before getting OOB and pt verbalizes understanding. Explained importance of oral care. Call light is within reach, treaded slipper socks on, bed in lowest/ locked position, hourly rounding in place, all needs met at this time

## 2023-11-14 NOTE — CARE PLAN
Problem: Ventilation  Goal: Ability to achieve and maintain unassisted ventilation or tolerate decreased levels of ventilator support  Description: Target End Date:  4 days     Document on Vent flowsheet    1.  Support and monitor invasive and noninvasive mechanical ventilation  2.  Monitor ventilator weaning response  3.  Perform ventilator associated pneumonia prevention interventions  4.  Manage ventilation therapy by monitoring diagnostic test results  Outcome: Progressing       Respiratory Update    Treatment modality: PEP  Frequency:QID  IS 1250    Pt tolerating current treatments well with no adverse reactions.

## 2023-11-14 NOTE — PROGRESS NOTES
"    DATE: 11/14/2023    Hospital Day 9  small bowel obstruction & volvulus .     Postoperative Day # 9 Exploratory laparotomy, small bowel resection, & ABThera Placement.     Postoperative Day # 7 Exploratory laparotomy, small bowel resection, primary anastomosis, & negative pressure dressing application.    INTERVAL EVENTS:  WBC remains elevated (17.0)  Afebrile, nontoxic appearance  CT chest/abdomen/pelvis without intraabdominal fluid collection, noted plural effusions and atelectasis  Ultrasound left upper extremity with superficial thrombus    - Empiric antibiotics initiated for pneumonia  - Blood cultures pending  - Continue aggressive pulmonary hygiene  - Trial Arshad removal  - Mobilize     REVIEW OF SYSTEMS:  Review of Systems   Constitutional:  Negative for chills and fever.   Respiratory:  Negative for cough.    Cardiovascular:  Negative for chest pain.   Gastrointestinal:  Positive for abdominal pain (incisional). Negative for nausea and vomiting.   Musculoskeletal:  Negative for myalgias.   Neurological:  Negative for tingling, tremors, sensory change and headaches.       PHYSICAL EXAMINATION:  Vital Signs: Blood Pressure 122/62   Pulse 96   Temperature 37.2 °C (99 °F) (Temporal)   Respiration 18   Height 1.626 m (5' 4\")   Weight 69.4 kg (153 lb)   Oxygen Saturation 94%   Physical Exam  Vitals and nursing note reviewed.   Constitutional:       General: She is not in acute distress.     Appearance: She is not toxic-appearing.      Comments: Up in chair   HENT:      Head: Normocephalic.      Nose: Nose normal.      Mouth/Throat:      Mouth: Mucous membranes are moist.      Pharynx: Oropharynx is clear.   Eyes:      Conjunctiva/sclera: Conjunctivae normal.   Cardiovascular:      Rate and Rhythm: Normal rate and regular rhythm.      Pulses: Normal pulses.   Pulmonary:      Effort: Pulmonary effort is normal. No respiratory distress.      Comments: IS 1250  Chest:      Chest wall: No tenderness. "   Abdominal:      General: There is no distension.      Palpations: Abdomen is soft.      Tenderness: There is abdominal tenderness (incisonal).      Comments: Midline incision with functioning wound VAC   Genitourinary:     Comments: Arshad with clear yellow urine  Skin:     General: Skin is warm and dry.   Neurological:      Mental Status: She is alert and oriented to person, place, and time.         LABORATORY VALUES:   Recent Labs     11/12/23 0447 11/13/23 0558 11/14/23  0752   WBC 14.3* 17.9* 17.0*   RBC 2.76* 2.62* 2.61*   HEMOGLOBIN 8.9* 8.2* 8.5*   HEMATOCRIT 26.2* 25.5* 25.6*   MCV 94.9 97.3 98.1*   MCH 32.2 31.3 32.6   MCHC 34.0 32.2 33.2   RDW 49.7 50.4* 50.6*   PLATELETCT 280 295 336   MPV 9.1 9.1 8.8*     Recent Labs     11/13/23 0558 11/13/23  0920 11/14/23  0752   SODIUM 138 138 135   POTASSIUM 3.9 3.7 4.0   CHLORIDE 110 109 105   CO2 18* 19* 22   GLUCOSE 102* 93 101*   BUN 11 10 8   CREATININE 0.60 0.64 0.60   CALCIUM 7.7* 7.8* 7.6*     Recent Labs     11/12/23 0447   ASTSGOT 20   ALTSGPT 12   TBILIRUBIN 0.2   ALKPHOSPHAT 73   GLOBULIN 2.5            IMAGING:   CT-CHEST,ABDOMEN,PELVIS WITH   Final Result      1. Small bowel ileus, with patent ileocolic anastomosis.   2. Interpolar region right renal cortical cyst.   3. Moderate right hydronephrosis.   4. Extensive anasarca with moderate ascites in the paracolic gutters and pelvis.   5. Moderate bilateral pleural effusions with passive atelectasis in the lung bases.   6. No evidence for intra-abdominal abscess.      US-EXTREMITY VENOUS UPPER UNILAT LEFT   Final Result      US-TRAUMA VEIN SCREEN LOWER BILAT EXTREMITY   Final Result      DX-CHEST-PORTABLE (1 VIEW)   Final Result      Increased left pleural effusion with superimposed left basilar airspace disease versus atelectasis.      DX-CHEST-PORTABLE (1 VIEW)   Final Result         1.  Left basilar atelectasis, no focal infiltrate      DX-CHEST-PORTABLE (1 VIEW)   Final Result         1.  Left  retrocardiac density suggesting subtle infiltrate.      DX-CHEST-PORTABLE (1 VIEW)   Final Result         1.  Left basilar atelectasis or early infiltrate.      EC-ECHOCARDIOGRAM COMPLETE W/ CONT   Final Result      DX-CHEST-PORTABLE (1 VIEW)   Final Result         1.  Left basilar atelectasis or early infiltrate.      DX-ABDOMEN FOR TUBE PLACEMENT   Final Result      1.  Enteric tube overlies the proximal stomach.      DX-CHEST-PORTABLE (1 VIEW)   Final Result      1.  There is no acute cardiopulmonary process.   2.  Satisfactory appearance of the tubes and lines.      US-EXTREMITY VENOUS LOWER BILAT   Final Result      OUTSIDE IMAGES-CT ABDOMEN /PELVIS   Final Result      OUTSIDE IMAGES-DX CHEST   Final Result      DX-CHEST-PORTABLE (1 VIEW)   Final Result      1.  There is no acute cardiopulmonary process.          Labs reviewed, Medications reviewed and Radiology images reviewed  Arshad catheter: Urinary Tract Retention or Urinary Tract Obstruction      DVT Prophylaxis: Enoxaparin (Lovenox)  DVT prophylaxis - mechanical: SCDs  Ulcer prophylaxis: Yes          ASSESSMENT AND PLAN:   * Small bowel obstruction (HCC)- (present on admission)  Assessment & Plan  11/5 Emergent Exploratory laparotomy with bowel resection.   Left in discontinuity / ABThera  11/7 Additional small bowel resected at ileum, and proximal anastomosis, fascial closure.    11/10 Zosyn day 4 of 4 from source control and closure.    11/13 CT abdomen pelvis completed for leukocytosis without evidence of intraabdominal abscess     Leukocytosis- (present on admission)  Assessment & Plan  11/12 WBC trend up to 14.9  - Chest xray with increased left effusion and atelectasis   - Bilateral lower extremity duplex, urinalysis, C diff stool sample negative  11/13 WBC continue to trend up  - Swelling to left upper extremity, ultrasound with superficial thrombus  - CT chest / abdomen / pelvis with bilateral pleural effusions and atelectasis  11/14 WBC slight  "trend down  - Empiric vanco and cefepime initiated, blood cultures / MRSA swab pending   - Repeat UA negative    Urinary retention  Assessment & Plan  11/13 Arshad placed overnight for retention  - Add Flomax     Anemia  Assessment & Plan  On oral iron supplementation outpatient.  11/8 Iron studies low, replacement ordered per pharmacy.  11/11 Hemglobin 6.9, 1 unit packed red cells transfused. Outpatient ferrous sulfate resumed.  Trend hemograms and transfuse packed red cells for hemoglobin less than 7.0    Acute deep vein thrombosis (DVT) (HCC)- (present on admission)  Assessment & Plan  11/5 Acute/subacute occlusive thrombus in the left posterior tibial vein.    11/8 Pharmacological DVT prophylaxis, Lovenox 40 mg Q day initiated.   11/12 Trauma bilateral lower extremity duplex negative for DVT.    No contraindication to deep vein thrombosis (DVT) prophylaxis- (present on admission)  Assessment & Plan  11/8 Pharmacological DVT prophylaxis, Lovenox initiated     HLD (hyperlipidemia)- (present on admission)  Assessment & Plan  Chronic condition treated with atorvastatin.  Holding maintenance medication during acute septic illness.  11/10 atorvastatin resumed.    Hypothyroid- (present on admission)  Assessment & Plan  Chronic condition treated with levothyroxine.  Holding maintenance medication during acute septic illness.  11/10 Resumed levothyroxine.    Managing multiple unrelated medical comorbidities. Modifier \"-24\" (Unrelated E/M service by the same physician during a post-operative period). I provided and documented the following services not related to the post-operative care of the principle procedure:  management of leukocytosis and initiation of antimicrobial therapy for treatment of pneumonia .      Discussed patient condition with RN, Patient, and general surgery, Dr. York.    "

## 2023-11-14 NOTE — THERAPY
Physical Therapy Contact Note    Patient Name: Rubi Haq  Age:  78 y.o., Sex:  female  Medical Record #: [id number]  Today's Date: 11/13/2023    Attempted to see pt for PT session. Pt with wound care at the time. Will follow up as able.     Shayna Acharya, PT, DPT

## 2023-11-14 NOTE — PROGRESS NOTES
Pharmacy Vancomycin Kinetics Note for 11/14/2023     78 y.o. female on Vancomycin day # 1     Vancomycin Indication (AUC Dosing):  Infection of unknown source    Provider specified end date: 11/21/23    Active Antibiotics (From admission, onward)      Ordered     Ordering Provider       Tue Nov 14, 2023  9:34 AM    11/14/23 0934  vancomycin (Vancocin) 1,250 mg in  mL IVPB  (vancomycin (VANCOCIN) IV (LD + Maintenance))  EVERY 24 HOURS         Doroteo York D.O.       Tue Nov 14, 2023  6:56 AM    11/14/23 0656  vancomycin (Vancocin) 1,750 mg in  mL IVPB  (vancomycin (VANCOCIN) IV (LD + Maintenance))  ONCE         Doroteo York D.O.       Tue Nov 14, 2023  6:13 AM    11/14/23 0613  cefepime (Maxipime) 2 g in  mL IVPB  (Trauma Infection Work-Up)  EVERY 8 HOURS        See Hyperspace for full Linked Orders Report.    Doroteo York D.O.    11/14/23 0613  MD Alert...Vancomycin per Pharmacy  (Trauma Infection Work-Up)  PHARMACY TO DOSE        Question:  Indication(s) for vancomycin?  Answer:  Unknown source of infection   See Hyperspace for full Linked Orders Report.    Doroteo York D.O.            Dosing Weight: 69.4 kg (153 lb)      Admission History: Admitted on 11/5/2023 for Small bowel obstruction (HCC) [K56.609]  Pertinent history: Vancomycin and cefepime initiated empirically for infection of unknown source. Patient completed 6 days of Zosyn on 11/10, s/p ex lap and small bowel resection x2. Worsening leukocytosis off of Zosyn and febrile overnight.     Allergies:     Nickel and Tramadol     Pertinent cultures to date:     Results       Procedure Component Value Units Date/Time    MRSA By PCR (Amp) [[id number]] Collected: 11/14/23 0745    Order Status: Sent Specimen: Respirate from Nares Updated: 11/14/23 0832    Narrative:      Collected By: [id number] HCERYL SILVESTRE    Blood Culture [[id number]] Collected: 11/14/23 0752    Order Status: Sent Specimen: Blood from Peripheral Updated: 11/14/23 0844     Narrative:      1 of 2 for Blood Culture  Release to patient->Immediate    Blood Culture [[id number]] Collected: 11/14/23 0752    Order Status: Sent Specimen: Blood from Peripheral Updated: 11/14/23 0829    Narrative:      2 of 2 for Blood Culture  Release to patient->Immediate    Urinalysis [[id number]] Collected: 11/14/23 0745    Order Status: Completed Specimen: Urine, Cath Updated: 11/14/23 0808     Color Yellow     Character Clear     Specific Gravity 1.018     Ph 6.0     Glucose Negative mg/dL      Ketones Negative mg/dL      Protein Negative mg/dL      Bilirubin Negative     Urobilinogen, Urine 0.2     Nitrite Negative     Leukocyte Esterase Negative     Occult Blood Negative     Micro Urine Req see below     Comment: Microscopic examination not performed when specimen is clear  and chemically negative for protein, blood, leukocyte esterase  and nitrite.         Narrative:      Collected By: [id number] CHERYL SILVESTRE    Blood Culture [[id number]]     Order Status: Canceled Specimen: Blood from Peripheral     Blood Culture [[id number]]     Order Status: Canceled Specimen: Blood from Peripheral     Culture Respiratory with Gram Stain [[id number]]     Order Status: No result Specimen: Respirate from Bronchoalveolar Lavage     URINALYSIS [[id number]]  (Abnormal) Collected: 11/12/23 1100    Order Status: Completed Specimen: Urine, Clean Catch Updated: 11/13/23 0105     Color Yellow     Character Clear     Specific Gravity 1.008     Ph 6.0     Glucose Negative mg/dL      Ketones Negative mg/dL      Protein Negative mg/dL      Bilirubin Negative     Urobilinogen, Urine 0.2     Nitrite Negative     Leukocyte Esterase Trace     Occult Blood Negative     Micro Urine Req Microscopic    Narrative:      Contact  Collected By: [id number] CARLOS Manzano to straight cath if needed once to obtain urine sample.  Release to patient->Immediate    C Diff by PCR rflx Toxin [[id number]] Collected: 11/12/23 1300    Order Status: Completed  Specimen: Stool Updated: 11/12/23 1902     C Diff by PCR Negative     Comment: C. difficile NOT detected by PCR.    Acute diarrhea Special Contact Precautions is required  until 48 hours after diarrhea has resolved, patient’s stool  has returned to baseline or until an infectious agent is  no longer suspected.  Treatment not indicated per guidelines.  Repeat testing not indicated within 7 days.          027-NAP1-BI Presumptive Negative     Comment: Presumptive 027/NAP1/BI target DNA sequences are NOT DETECTED.       Narrative:      Contact  Collected By: [id number] CARLOS PEARL  Does this patient have risk factors for C-diff?->Yes  C-Diff Risk Factors->gastrointestinal surgery/manipulation  Has patient taken stool softeners or laxatives in the last 5  days?->No  Release to patient->Immediate    Blood Culture [[id number]] Collected: 11/06/23 0445    Order Status: Completed Specimen: Blood Updated: 11/11/23 1100     Significant Indicator NEG     Source BLD     Site Peripheral     Culture Result No growth after 5 days of incubation.    Narrative:      Left Forearm/Arm    Blood Culture [[id number]] Collected: 11/06/23 0455    Order Status: Completed Specimen: Blood Updated: 11/11/23 1100     Significant Indicator NEG     Source BLD     Site Peripheral     Culture Result No growth after 5 days of incubation.    Narrative:      Right Forearm/Arm    Blood Culture [[id number]] Collected: 11/06/23 0445    Order Status: Canceled Specimen: Blood from Peripheral     Blood Culture [[id number]] Collected: 11/06/23 0445    Order Status: Canceled Specimen: Blood from Peripheral     URINALYSIS [[id number]]  (Abnormal) Collected: 11/05/23 2310    Order Status: Completed Specimen: Urine, Arshad Cath Updated: 11/05/23 2338     Color Yellow     Character Clear     Specific Gravity >=1.030     Ph 5.5     Glucose Negative mg/dL      Ketones Trace mg/dL      Protein 30 mg/dL      Bilirubin Negative     Urobilinogen, Urine 0.2     Nitrite Negative  "    Leukocyte Esterase Negative     Occult Blood Large     Micro Urine Req Microscopic    Narrative:      Collected By: [id number] BRENDAN SANCHEZ DAMARIS  Release to patient->Immediate            Labs:     Estimated Creatinine Clearance: 71.5 mL/min (by C-G formula based on SCr of 0.6 mg/dL).  Recent Labs     23  1300 23  0447 23  0558 23  0752   WBC 12.9* 14.3* 17.9* 17.0*   NEUTSPOLYS  --  80.30* 86.30* 85.40*     Recent Labs     23  0447 23  0558 23  0920 23  0752   BUN 11 11 10 8   CREATININE 0.64 0.60 0.64 0.60   ALBUMIN 2.0*  --   --   --        Intake/Output Summary (Last 24 hours) at 2023 0936  Last data filed at 2023 0834  Gross per 24 hour   Intake 618.62 ml   Output 2950 ml   Net -2331.38 ml      /62   Pulse 96   Temp 37.2 °C (99 °F) (Temporal)   Resp 18   Ht 1.626 m (5' 4\")   Wt 69.4 kg (153 lb)   SpO2 94%  Temp (24hrs), Av.4 °C (99.3 °F), Min:36.6 °C (97.9 °F), Max:38.7 °C (101.7 °F)      List concerns for Vancomycin clearance:     Malnutrition/Low albumin;Age    Pharmacokinetics:     AUC kinetics:   Ke (hr ^-1): 0.0625 hr^-1  Half life: 11.09 hr  Clearance: 2.819  Estimated TDD: 1409.5  Estimated Dose: 769  Estimated interval: 13.1    A/P:     -  Vancomycin dose: a loading dose of 1750 mg was given at 0855, with subsequent dosing of 1250 mg Q24H starting ~24 hours from loading dose.     -  Next vancomycin level(s): at steady state, not ordered    -  Predicted vancomycin AUC from initial AUC test calculator: 443 mg·hr/L    -  Comments: Anticipate patient will tolerate AUC based dosing with regimen outlined above. Will plan to check levels when patient closer to steady state, or sooner if concerns for clearance. MRSA PCR and blood cultures pending. Pharmacy will continue to follow and make adjustments as appropriate.     Patrica Carrero, PharmD    "

## 2023-11-14 NOTE — PROGRESS NOTES
"Bedside report received.  Assessment complete.  A&O x 4. Patient calls appropriately.  Patient ambulates with x1 assist w/ FWW.  Patient has 3/10 pain. Medicated per MAR for headache & fever.  MLI w/ [place]  Tolerating regular diet; consumed <25% of dinner tray. Denies N/V.  + output to ackerman  + flatus and BM today (11/13), pt states small BM and that \"she hasn't had a BM since ackerman placement, but has had lots of gas\"  Reviewed plan of care with patient. Call light and personal belongings within reach. Hourly rounding in place. All needs met at this time.   "

## 2023-11-14 NOTE — CARE PLAN
The patient is Stable - Low risk of patient condition declining or worsening    Shift Goals  Clinical Goals: pain control, monitor labs  Patient Goals: comfort and rest  Family Goals: Updates    Progress made toward(s) clinical / shift goals: Patient pain managed with prn medication per MAR. Will monitor AM labs.

## 2023-11-15 LAB
ANION GAP SERPL CALC-SCNC: 10 MMOL/L (ref 7–16)
BASOPHILS # BLD AUTO: 0.3 % (ref 0–1.8)
BASOPHILS # BLD: 0.04 K/UL (ref 0–0.12)
BUN SERPL-MCNC: 8 MG/DL (ref 8–22)
CALCIUM SERPL-MCNC: 8 MG/DL (ref 8.5–10.5)
CHLORIDE SERPL-SCNC: 106 MMOL/L (ref 96–112)
CO2 SERPL-SCNC: 21 MMOL/L (ref 20–33)
CREAT SERPL-MCNC: 0.7 MG/DL (ref 0.5–1.4)
EOSINOPHIL # BLD AUTO: 0.3 K/UL (ref 0–0.51)
EOSINOPHIL NFR BLD: 2.2 % (ref 0–6.9)
ERYTHROCYTE [DISTWIDTH] IN BLOOD BY AUTOMATED COUNT: 50 FL (ref 35.9–50)
FLUAV RNA SPEC QL NAA+PROBE: NEGATIVE
FLUBV RNA SPEC QL NAA+PROBE: NEGATIVE
GFR SERPLBLD CREATININE-BSD FMLA CKD-EPI: 87 ML/MIN/1.73 M 2
GLUCOSE SERPL-MCNC: 100 MG/DL (ref 65–99)
HCT VFR BLD AUTO: 25.2 % (ref 37–47)
HGB BLD-MCNC: 8.1 G/DL (ref 12–16)
IMM GRANULOCYTES # BLD AUTO: 0.13 K/UL (ref 0–0.11)
IMM GRANULOCYTES NFR BLD AUTO: 1 % (ref 0–0.9)
LYMPHOCYTES # BLD AUTO: 0.95 K/UL (ref 1–4.8)
LYMPHOCYTES NFR BLD: 7 % (ref 22–41)
MCH RBC QN AUTO: 31.5 PG (ref 27–33)
MCHC RBC AUTO-ENTMCNC: 32.1 G/DL (ref 32.2–35.5)
MCV RBC AUTO: 98.1 FL (ref 81.4–97.8)
MONOCYTES # BLD AUTO: 0.89 K/UL (ref 0–0.85)
MONOCYTES NFR BLD AUTO: 6.6 % (ref 0–13.4)
NEUTROPHILS # BLD AUTO: 11.24 K/UL (ref 1.82–7.42)
NEUTROPHILS NFR BLD: 82.9 % (ref 44–72)
NRBC # BLD AUTO: 0 K/UL
NRBC BLD-RTO: 0 /100 WBC (ref 0–0.2)
PLATELET # BLD AUTO: 392 K/UL (ref 164–446)
PMV BLD AUTO: 9 FL (ref 9–12.9)
POTASSIUM SERPL-SCNC: 3.9 MMOL/L (ref 3.6–5.5)
RBC # BLD AUTO: 2.57 M/UL (ref 4.2–5.4)
RSV RNA SPEC QL NAA+PROBE: NEGATIVE
SARS-COV-2 RNA RESP QL NAA+PROBE: NOTDETECTED
SODIUM SERPL-SCNC: 137 MMOL/L (ref 135–145)
SPECIMEN SOURCE: NORMAL
WBC # BLD AUTO: 13.6 K/UL (ref 4.8–10.8)

## 2023-11-15 PROCEDURE — 700102 HCHG RX REV CODE 250 W/ 637 OVERRIDE(OP): Performed by: SURGERY

## 2023-11-15 PROCEDURE — A9270 NON-COVERED ITEM OR SERVICE: HCPCS | Performed by: SURGERY

## 2023-11-15 PROCEDURE — 99024 POSTOP FOLLOW-UP VISIT: CPT

## 2023-11-15 PROCEDURE — 94760 N-INVAS EAR/PLS OXIMETRY 1: CPT

## 2023-11-15 PROCEDURE — 700102 HCHG RX REV CODE 250 W/ 637 OVERRIDE(OP)

## 2023-11-15 PROCEDURE — 85025 COMPLETE CBC W/AUTO DIFF WBC: CPT

## 2023-11-15 PROCEDURE — 94669 MECHANICAL CHEST WALL OSCILL: CPT

## 2023-11-15 PROCEDURE — 700111 HCHG RX REV CODE 636 W/ 250 OVERRIDE (IP): Mod: JZ | Performed by: SURGERY

## 2023-11-15 PROCEDURE — 36415 COLL VENOUS BLD VENIPUNCTURE: CPT

## 2023-11-15 PROCEDURE — C9803 HOPD COVID-19 SPEC COLLECT: HCPCS

## 2023-11-15 PROCEDURE — A9270 NON-COVERED ITEM OR SERVICE: HCPCS

## 2023-11-15 PROCEDURE — 700105 HCHG RX REV CODE 258: Performed by: SURGERY

## 2023-11-15 PROCEDURE — 0241U HCHG SARS-COV-2 COVID-19 NFCT DS RESP RNA 4 TRGT MIC: CPT

## 2023-11-15 PROCEDURE — 97605 NEG PRS WND THER DME<=50SQCM: CPT

## 2023-11-15 PROCEDURE — 770001 HCHG ROOM/CARE - MED/SURG/GYN PRIV*

## 2023-11-15 PROCEDURE — 97602 WOUND(S) CARE NON-SELECTIVE: CPT

## 2023-11-15 PROCEDURE — 80048 BASIC METABOLIC PNL TOTAL CA: CPT

## 2023-11-15 RX ADMIN — GABAPENTIN 300 MG: 300 CAPSULE ORAL at 23:17

## 2023-11-15 RX ADMIN — Medication 2000 UNITS: at 06:18

## 2023-11-15 RX ADMIN — GABAPENTIN 300 MG: 300 CAPSULE ORAL at 06:18

## 2023-11-15 RX ADMIN — ACETAMINOPHEN 650 MG: 325 TABLET, FILM COATED ORAL at 16:38

## 2023-11-15 RX ADMIN — ENOXAPARIN SODIUM 40 MG: 100 INJECTION SUBCUTANEOUS at 08:06

## 2023-11-15 RX ADMIN — CEFEPIME 2 G: 2 INJECTION, POWDER, FOR SOLUTION INTRAVENOUS at 06:22

## 2023-11-15 RX ADMIN — CEFEPIME 2 G: 2 INJECTION, POWDER, FOR SOLUTION INTRAVENOUS at 22:15

## 2023-11-15 RX ADMIN — GABAPENTIN 300 MG: 300 CAPSULE ORAL at 16:37

## 2023-11-15 RX ADMIN — CEFEPIME 2 G: 2 INJECTION, POWDER, FOR SOLUTION INTRAVENOUS at 14:35

## 2023-11-15 RX ADMIN — FAMOTIDINE 20 MG: 20 TABLET ORAL at 16:38

## 2023-11-15 RX ADMIN — LEVOTHYROXINE SODIUM 25 MCG: 0.03 TABLET ORAL at 16:38

## 2023-11-15 RX ADMIN — SIMETHICONE 125 MG: 125 TABLET, CHEWABLE ORAL at 00:12

## 2023-11-15 RX ADMIN — OXYCODONE 2.5 MG: 5 TABLET ORAL at 12:30

## 2023-11-15 RX ADMIN — FERROUS SULFATE TAB 325 MG (65 MG ELEMENTAL FE) 325 MG: 325 (65 FE) TAB at 06:18

## 2023-11-15 RX ADMIN — GABAPENTIN 300 MG: 300 CAPSULE ORAL at 12:30

## 2023-11-15 RX ADMIN — ATORVASTATIN CALCIUM 20 MG: 20 TABLET, FILM COATED ORAL at 16:37

## 2023-11-15 RX ADMIN — ACETAMINOPHEN 650 MG: 325 TABLET, FILM COATED ORAL at 06:18

## 2023-11-15 RX ADMIN — FAMOTIDINE 20 MG: 20 TABLET ORAL at 06:18

## 2023-11-15 RX ADMIN — OXYCODONE 5 MG: 5 TABLET ORAL at 14:33

## 2023-11-15 ASSESSMENT — LIFESTYLE VARIABLES
EVER FELT BAD OR GUILTY ABOUT YOUR DRINKING: NO
HAVE YOU EVER FELT YOU SHOULD CUT DOWN ON YOUR DRINKING: NO
EVER HAD A DRINK FIRST THING IN THE MORNING TO STEADY YOUR NERVES TO GET RID OF A HANGOVER: NO
TOTAL SCORE: 0
TOTAL SCORE: 0
HAVE PEOPLE ANNOYED YOU BY CRITICIZING YOUR DRINKING: NO
ALCOHOL_USE: NO
HOW MANY TIMES IN THE PAST YEAR HAVE YOU HAD 5 OR MORE DRINKS IN A DAY: 0
DOES PATIENT WANT TO STOP DRINKING: NO
ON A TYPICAL DAY WHEN YOU DRINK ALCOHOL HOW MANY DRINKS DO YOU HAVE: 0
CONSUMPTION TOTAL: NEGATIVE
AVERAGE NUMBER OF DAYS PER WEEK YOU HAVE A DRINK CONTAINING ALCOHOL: 0
TOTAL SCORE: 0

## 2023-11-15 NOTE — PROGRESS NOTES
"  DATE: 11/15/2023    Hospital Day 10  small bowel obstruction & volvulus .    Postoperative Day # 10 Exploratory laparotomy, small bowel resection, & ABThera Placement.    Postoperative Day # 8 Exploratory laparotomy, small bowel resection, primary anastomosis, & negative pressure dressing application.    INTERVAL EVENTS:  WBC trend down to 13.6 this AM cefepime initiated yesterday.  Prelim blood cultures remain negative.  Afebrile & clinically non toxic.    - Trend blood cultures  - Continue cefepime & repeat CBC in the AM  - Disposition: PT recommends discharge home once medically clear. Plan to discharge home with home health wound care and wound vac once medically clear.     PHYSICAL EXAMINATION:  Vital Signs: /64   Pulse 88   Temp 37.5 °C (99.5 °F) (Temporal)   Resp 18   Ht 1.626 m (5' 4.02\")   Wt 70.3 kg (154 lb 15.7 oz)   SpO2 94%     Alert & oriented, afebrile, no acute distress.  Unlabored respirations tolerating room air.   Abdomen soft, non distended, & non tender.  Midline abdominal incision with vac intact & functioning.  Tolerating regular diet, no nausea or vomiting.   + flatus   Last BM 11/14  Bilateral lower extremity edema    LABORATORY VALUES:  Recent Labs     11/13/23  0558 11/14/23  0752 11/15/23  0754   WBC 17.9* 17.0* 13.6*   RBC 2.62* 2.61* 2.57*   HEMOGLOBIN 8.2* 8.5* 8.1*   HEMATOCRIT 25.5* 25.6* 25.2*   MCV 97.3 98.1* 98.1*   MCH 31.3 32.6 31.5   MCHC 32.2 33.2 32.1*   RDW 50.4* 50.6* 50.0   PLATELETCT 295 336 392   MPV 9.1 8.8* 9.0     Recent Labs     11/13/23  0920 11/14/23  0752 11/15/23  0754   SODIUM 138 135 137   POTASSIUM 3.7 4.0 3.9   CHLORIDE 109 105 106   CO2 19* 22 21   GLUCOSE 93 101* 100*   BUN 10 8 8   CREATININE 0.64 0.60 0.70   CALCIUM 7.8* 7.6* 8.0*     IMAGING:  CT-CHEST,ABDOMEN,PELVIS WITH   Final Result      1. Small bowel ileus, with patent ileocolic anastomosis.   2. Interpolar region right renal cortical cyst.   3. Moderate right hydronephrosis.   4. " Extensive anasarca with moderate ascites in the paracolic gutters and pelvis.   5. Moderate bilateral pleural effusions with passive atelectasis in the lung bases.   6. No evidence for intra-abdominal abscess.      US-EXTREMITY VENOUS UPPER UNILAT LEFT   Final Result      US-TRAUMA VEIN SCREEN LOWER BILAT EXTREMITY   Final Result      DX-CHEST-PORTABLE (1 VIEW)   Final Result      Increased left pleural effusion with superimposed left basilar airspace disease versus atelectasis.      DX-CHEST-PORTABLE (1 VIEW)   Final Result         1.  Left basilar atelectasis, no focal infiltrate      DX-CHEST-PORTABLE (1 VIEW)   Final Result         1.  Left retrocardiac density suggesting subtle infiltrate.      DX-CHEST-PORTABLE (1 VIEW)   Final Result         1.  Left basilar atelectasis or early infiltrate.      EC-ECHOCARDIOGRAM COMPLETE W/ CONT   Final Result      DX-CHEST-PORTABLE (1 VIEW)   Final Result         1.  Left basilar atelectasis or early infiltrate.      DX-ABDOMEN FOR TUBE PLACEMENT   Final Result      1.  Enteric tube overlies the proximal stomach.      DX-CHEST-PORTABLE (1 VIEW)   Final Result      1.  There is no acute cardiopulmonary process.   2.  Satisfactory appearance of the tubes and lines.      US-EXTREMITY VENOUS LOWER BILAT   Final Result      OUTSIDE IMAGES-CT ABDOMEN /PELVIS   Final Result      OUTSIDE IMAGES-DX CHEST   Final Result      DX-CHEST-PORTABLE (1 VIEW)   Final Result      1.  There is no acute cardiopulmonary process.        ASSESSMENT AND PLAN:  * Small bowel obstruction (HCC)- (present on admission)  Assessment & Plan  11/5 Emergent Exploratory laparotomy with bowel resection.   Left in discontinuity / ABThera  11/7 Additional small bowel resected at ileum, and proximal anastomosis, fascial closure.    11/10 Zosyn day 4 of 4 from source control and closure.    11/13 CT abdomen pelvis completed for leukocytosis without evidence of intraabdominal abscess     Leukocytosis- (present on  admission)  Assessment & Plan  11/12 WBC trend up to 14.9  - Chest xray with increased left effusion and atelectasis   - Bilateral lower extremity duplex, urinalysis, C diff stool sample negative  11/13 WBC continue to trend up  - Swelling to left upper extremity, ultrasound with superficial thrombus  - CT chest / abdomen / pelvis with bilateral pleural effusions and atelectasis  11/14 WBC slight trend down  - Empiric vanco and cefepime initiated & blood cultures obtained  - MRSA swab negative, vanco discontinued  - Repeat UA negative    Urinary retention  Assessment & Plan  11/13 Arshad placed overnight for retention  - Add Flomax     Anemia  Assessment & Plan  On oral iron supplementation outpatient.  11/8 Iron studies low, replacement ordered per pharmacy.  11/11 Hemglobin 6.9, 1 unit packed red cells transfused. Outpatient ferrous sulfate resumed.  Trend hemograms and transfuse packed red cells for hemoglobin less than 7.0    Acute deep vein thrombosis (DVT) (HCC)- (present on admission)  Assessment & Plan  11/5 Acute/subacute occlusive thrombus in the left posterior tibial vein.    11/8 Pharmacological DVT prophylaxis, Lovenox 40 mg Q day initiated.   11/12 Trauma bilateral lower extremity duplex negative for DVT.    No contraindication to deep vein thrombosis (DVT) prophylaxis- (present on admission)  Assessment & Plan  11/8 Pharmacological DVT prophylaxis, Lovenox initiated     HLD (hyperlipidemia)- (present on admission)  Assessment & Plan  Chronic condition treated with atorvastatin.  Holding maintenance medication during acute septic illness.  11/10 atorvastatin resumed.    Hypothyroid- (present on admission)  Assessment & Plan  Chronic condition treated with levothyroxine.  Holding maintenance medication during acute septic illness.  11/10 Resumed levothyroxine.         ____________________________________     YESICA Stevenson

## 2023-11-15 NOTE — PROGRESS NOTES
Report received from Amarilys LEE, assumed care at 0645.  Pt is A0X4, and responds appropriately   Pt declines any SOB, chest pain, new onset of numbness/ tingling  Oxygen >90% on RA  Pt states she has a headache rating a 3/10. Tylenol given for headache.  Pt has + output. Urgency noted.  Pt has + flatus, + bowel sounds, + BM 11/14  Pt ambulates with a x1 assist and FWW  Pt is tolerating a regular diet, pt denies any nausea/vomiting  Midline w/ wound vac CDI  Plan of care discussed, all questions answered. Explained importance of calling before getting OOB and pt verbalizes understanding. Explained importance of oral care. Call light is within reach, treaded slipper socks on, bed in lowest/ locked position, hourly rounding in place, all needs met at this time

## 2023-11-15 NOTE — CARE PLAN
Problem: Hyperinflation  Goal: Prevent or improve atelectasis  Description: Target End Date:  3 to 4 days    1. Instruct incentive spirometry usage  2.  Perform hyperinflation therapy as indicated  Outcome: Progressing   PEP QID   IS 1500 stable

## 2023-11-15 NOTE — DISCHARGE PLANNING
PM&R referral found from [facility] Gastelum dated 11/09/2023. Surgical debility. Based on current therapy documentation anticipate home with OP support. No physiatry consult ordered epr protocol.

## 2023-11-15 NOTE — CARE PLAN
The patient is Stable - Low risk of patient condition declining or worsening    Shift Goals  Clinical Goals: pain control, IV abx, void post ackerman removal  Patient Goals: comfort, rest  Family Goals: Updates    Progress made toward(s) clinical / shift goals: Patient pain managed with prn medication per MAR. Patient IV abx given per MAR. Patient able to void multiple times >200 since ackerman removal.

## 2023-11-15 NOTE — PROGRESS NOTES
Bedside report received.  Assessment complete.  A&O x 4. Patient calls appropriately.  Patient ambulates with x1 assist w/ FWW.  Patient has 4/10 pain. Declined pharmacological intervention at this time  MLI w/ [place]  Tolerating regular diet; consumed 0% of dinner tray. Denies N/V.  Patient ackerman removed today; + void since removal  + flatus and BM today (11/14)  Reviewed plan of care with patient. Call light and personal belongings within reach. Hourly rounding in place. All needs met at this time

## 2023-11-15 NOTE — CARE PLAN
Problem: Knowledge Deficit - Standard  Goal: Patient and family/care givers will demonstrate understanding of plan of care, disease process/condition, diagnostic tests and medications  Outcome: Progressing     Problem: Respiratory  Goal: Patient will achieve/maintain optimum respiratory ventilation and gas exchange  Outcome: Progressing     Problem: Pain - Standard  Goal: Alleviation of pain or a reduction in pain to the patient’s comfort goal  Outcome: Progressing     Problem: Skin Integrity  Goal: Skin integrity is maintained or improved  Outcome: Progressing     Problem: Fall Risk  Goal: Patient will remain free from falls  Outcome: Progressing   The patient is Stable - Low risk of patient condition declining or worsening    Shift Goals  Clinical Goals: pain control, mobility, pulmonary hygiene, pulse checks, I&O, IV abx  Patient Goals: comfort, rest  Family Goals: Updates    Progress made toward(s) clinical / shift goals:    Patient ambulated 150 ft with PT this shift and up to chair for meals. Will continue to encourage frequent mobilization.  Patient states tolerable pain level throughout shift.  IV abx started this shift. Will continue to monitor trend in labs.

## 2023-11-15 NOTE — DISCHARGE PLANNING
Case Management Discharge Planning    Admission Date: 11/5/2023  GMLOS: 9.9  ALOS: 10    6-Clicks ADL Score: 18  6-Clicks Mobility Score: 18      Anticipated Discharge Dispo: Discharge Disposition: D/T to short term gen hosp for IP care w/ planned IP readmit (82)  Discharge Address: daughter's [address] [place] [address]  Discharge Contact Phone Number: correct on face sheet    DME Needed: Yes    DME Ordered: No    Action(s) Taken: Updated Provider/Nurse on Discharge Plan    Escalations Completed: None    Medically Clear: No    Next Steps: Per IDT rounds; Per trauma notes Kalyani Pate; Continue cefepime & repeat CBC in the AM, PT recommends discharge home once medically clear. Plan to discharge home with home health wound care and wound vac once medically clear.     Barriers to Discharge: Medical clearance    Is the patient up for discharge tomorrow: No

## 2023-11-15 NOTE — THERAPY
"Physical Therapy   Daily Treatment     Patient Name: Rubi Haq  Age:  78 y.o., Sex:  female  Medical Record #: [id number]  Today's Date: 11/14/2023     Precautions  Precautions: Fall Risk  Comments: Abdominal precautions, wound vac    Assessment    Pt received in bed and agreeable to PT session. Pt was able to continue to progress with mobility and activity tolerance this session. Pt walked to the bathroom, performed numerous sit<>stands to don compression garments/underwear/pants, and then was able to ambulate 150ft in the unit using the FWW. Pt required intermittent seated rest breaks during the session due to fatigue before continuing to progress. Recommend home with family support. Will follow for acute PT to continue to progress.    Plan    Treatment Plan Status: Continue Current Treatment Plan  Type of Treatment: Bed Mobility, Gait Training, Equipment, Neuro Re-Education / Balance, Stair Training, Therapeutic Activities, Therapeutic Exercise  Treatment Frequency: 4 Times per Week  Treatment Duration: Until Therapy Goals Met    DC Equipment Recommendations: None  Discharge Recommendations: Recommend outpatient physical therapy services to address higher level deficits    Subjective    \"I need to try and put on the compression garments myself today\"     Objective       11/14/23 1525   Precautions   Precautions Fall Risk   Comments Abdominal precautions, wound vac   Vitals   O2 (LPM) 0   O2 Delivery Device None - Room Air   Pain 0 - 10 Group   Therapist Pain Assessment Post Activity Pain Same as Prior to Activity;Nurse Notified;2   Cognition    Orientation Level Oriented x 4   Level of Consciousness Alert   Ability To Follow Commands 3 Step   Comments Pt very pleasant and motivated to participate in order to get back to independent baseline   Standing Lower Body Exercises   Comments Pt performed STS x10 during session for donning compression stockings and pants.   Balance   Sitting Balance (Static) Fair + "   Sitting Balance (Dynamic) Fair +   Standing Balance (Static) Fair   Standing Balance (Dynamic) Fair   Weight Shift Sitting Good   Weight Shift Standing Good   Skilled Intervention Verbal Cuing;Compensatory Strategies   Comments short distances with no AD, long distances with FWW. No losses of balance.   Bed Mobility    Supine to Sit Supervised   Scooting Supervised   Rolling Supervised   Comments HOB flat   Gait Analysis   Gait Level Of Assist Supervised   Assistive Device Front Wheel Walker   Distance (Feet) 150   # of Times Distance was Traveled 1   Deviation Decreased Heel Strike;Decreased Toe Off   Skilled Intervention Verbal Cuing;Compensatory Strategies   Comments Improved tolerance to ambulation. No losses of balance with FWW for support and energy conservation when walking longer distance.   Functional Mobility   Sit to Stand Supervised   Bed, Chair, Wheelchair Transfer Supervised   Toilet Transfers Standby Assist   Mobility up with FWW   Skilled Intervention Verbal Cuing   How much difficulty does the patient currently have...   Turning over in bed (including adjusting bedclothes, sheets and blankets)? 3   Sitting down on and standing up from a chair with arms (e.g., wheelchair, bedside commode, etc.) 3   Moving from lying on back to sitting on the side of the bed? 2   How much help from another person does the patient currently need...   Moving to and from a bed to a chair (including a wheelchair)? 4   Need to walk in a hospital room? 3   Climbing 3-5 steps with a railing? 3   6 clicks Mobility Score 18   Short Term Goals    Short Term Goal # 1 in 6 visits patient will demo all functional transfers with sup and LRAD for safe DC   Goal Outcome # 1 Goal met   Short Term Goal # 2 in 6 visits patient will ambulate 100' w/FWW and Rossy for safe DC   Goal Outcome # 2 Goal met, new goal added   Short Term Goal # 2 B  Pt will ambulate 200ft with FWW and supervision to progress function in 6 visits.   Short Term  Goal # 3 in 6 visits patient will demo all bed mobility indep for safe DC   Goal Outcome # 3 Goal met   Short Term Goal # 4 Pt will negotiate 2 stairs with min A via handheld assist to enter daughters home in 6 visits.   Anticipated Discharge Equipment and Recommendations   DC Equipment Recommendations None   Discharge Recommendations Recommend outpatient physical therapy services to address higher level deficits   Interdisciplinary Plan of Care Collaboration   IDT Collaboration with  Nursing;Occupational Therapist   Patient Position at End of Therapy Seated   Collaboration Comments RN updated

## 2023-11-15 NOTE — CARE PLAN
Problem: Knowledge Deficit - Standard  Goal: Patient and family/care givers will demonstrate understanding of plan of care, disease process/condition, diagnostic tests and medications  Outcome: Progressing     Problem: Fluid Volume  Goal: Fluid volume balance will be maintained  Outcome: Progressing     Problem: Urinary - Renal Perfusion  Goal: Ability to achieve and maintain adequate renal perfusion and functioning will improve  Outcome: Progressing     Problem: Pain - Standard  Goal: Alleviation of pain or a reduction in pain to the patient’s comfort goal  Outcome: Progressing     Problem: Skin Integrity  Goal: Skin integrity is maintained or improved  Outcome: Progressing     Problem: Fall Risk  Goal: Patient will remain free from falls  Outcome: Progressing   The patient is Stable - Low risk of patient condition declining or worsening    Shift Goals  Clinical Goals: pain control, I&O, IV abx, mobility  Patient Goals: comfort, rest  Family Goals: Updates    Progress made toward(s) clinical / shift goals:    Patients pain managed with PRN medication.  Ambulating this shift.   Continue to monitor labs/vs.  Improved UO this shift.

## 2023-11-16 ENCOUNTER — APPOINTMENT (OUTPATIENT)
Dept: RADIOLOGY | Facility: MEDICAL CENTER | Age: 78
DRG: 853 | End: 2023-11-16
Payer: MEDICARE

## 2023-11-16 LAB
ANION GAP SERPL CALC-SCNC: 11 MMOL/L (ref 7–16)
APPEARANCE UR: CLEAR
BASOPHILS # BLD AUTO: 0.5 % (ref 0–1.8)
BASOPHILS # BLD: 0.07 K/UL (ref 0–0.12)
BILIRUB UR QL STRIP.AUTO: NEGATIVE
BUN SERPL-MCNC: 9 MG/DL (ref 8–22)
CALCIUM SERPL-MCNC: 8.3 MG/DL (ref 8.5–10.5)
CHLORIDE SERPL-SCNC: 102 MMOL/L (ref 96–112)
CO2 SERPL-SCNC: 19 MMOL/L (ref 20–33)
COLOR UR: YELLOW
CREAT SERPL-MCNC: 0.75 MG/DL (ref 0.5–1.4)
EOSINOPHIL # BLD AUTO: 0.3 K/UL (ref 0–0.51)
EOSINOPHIL NFR BLD: 2 % (ref 0–6.9)
ERYTHROCYTE [DISTWIDTH] IN BLOOD BY AUTOMATED COUNT: 51.2 FL (ref 35.9–50)
GFR SERPLBLD CREATININE-BSD FMLA CKD-EPI: 80 ML/MIN/1.73 M 2
GLUCOSE SERPL-MCNC: 98 MG/DL (ref 65–99)
GLUCOSE UR STRIP.AUTO-MCNC: NEGATIVE MG/DL
HCT VFR BLD AUTO: 28.4 % (ref 37–47)
HGB BLD-MCNC: 8.8 G/DL (ref 12–16)
IMM GRANULOCYTES # BLD AUTO: 0.15 K/UL (ref 0–0.11)
IMM GRANULOCYTES NFR BLD AUTO: 1 % (ref 0–0.9)
KETONES UR STRIP.AUTO-MCNC: NEGATIVE MG/DL
LEUKOCYTE ESTERASE UR QL STRIP.AUTO: NEGATIVE
LYMPHOCYTES # BLD AUTO: 1.41 K/UL (ref 1–4.8)
LYMPHOCYTES NFR BLD: 9.3 % (ref 22–41)
MCH RBC QN AUTO: 31.2 PG (ref 27–33)
MCHC RBC AUTO-ENTMCNC: 31 G/DL (ref 32.2–35.5)
MCV RBC AUTO: 100.7 FL (ref 81.4–97.8)
MICRO URNS: NORMAL
MONOCYTES # BLD AUTO: 1.07 K/UL (ref 0–0.85)
MONOCYTES NFR BLD AUTO: 7 % (ref 0–13.4)
NEUTROPHILS # BLD AUTO: 12.22 K/UL (ref 1.82–7.42)
NEUTROPHILS NFR BLD: 80.2 % (ref 44–72)
NITRITE UR QL STRIP.AUTO: NEGATIVE
NRBC # BLD AUTO: 0 K/UL
NRBC BLD-RTO: 0 /100 WBC (ref 0–0.2)
PH UR STRIP.AUTO: 6 [PH] (ref 5–8)
PLATELET # BLD AUTO: 455 K/UL (ref 164–446)
PMV BLD AUTO: 9.3 FL (ref 9–12.9)
POTASSIUM SERPL-SCNC: 3.3 MMOL/L (ref 3.6–5.5)
PROT UR QL STRIP: NEGATIVE MG/DL
RBC # BLD AUTO: 2.82 M/UL (ref 4.2–5.4)
RBC UR QL AUTO: NEGATIVE
SODIUM SERPL-SCNC: 132 MMOL/L (ref 135–145)
SP GR UR STRIP.AUTO: 1.01
UROBILINOGEN UR STRIP.AUTO-MCNC: 0.2 MG/DL
WBC # BLD AUTO: 15.2 K/UL (ref 4.8–10.8)

## 2023-11-16 PROCEDURE — 85025 COMPLETE CBC W/AUTO DIFF WBC: CPT

## 2023-11-16 PROCEDURE — 97535 SELF CARE MNGMENT TRAINING: CPT

## 2023-11-16 PROCEDURE — 700111 HCHG RX REV CODE 636 W/ 250 OVERRIDE (IP): Mod: JZ | Performed by: SURGERY

## 2023-11-16 PROCEDURE — 770001 HCHG ROOM/CARE - MED/SURG/GYN PRIV*

## 2023-11-16 PROCEDURE — 81003 URINALYSIS AUTO W/O SCOPE: CPT

## 2023-11-16 PROCEDURE — 700105 HCHG RX REV CODE 258: Performed by: SURGERY

## 2023-11-16 PROCEDURE — A9270 NON-COVERED ITEM OR SERVICE: HCPCS | Performed by: NURSE PRACTITIONER

## 2023-11-16 PROCEDURE — 99024 POSTOP FOLLOW-UP VISIT: CPT

## 2023-11-16 PROCEDURE — 700102 HCHG RX REV CODE 250 W/ 637 OVERRIDE(OP): Performed by: SURGERY

## 2023-11-16 PROCEDURE — 94669 MECHANICAL CHEST WALL OSCILL: CPT

## 2023-11-16 PROCEDURE — A9270 NON-COVERED ITEM OR SERVICE: HCPCS

## 2023-11-16 PROCEDURE — 80048 BASIC METABOLIC PNL TOTAL CA: CPT

## 2023-11-16 PROCEDURE — 700102 HCHG RX REV CODE 250 W/ 637 OVERRIDE(OP)

## 2023-11-16 PROCEDURE — 76604 US EXAM CHEST: CPT

## 2023-11-16 PROCEDURE — A9270 NON-COVERED ITEM OR SERVICE: HCPCS | Performed by: SURGERY

## 2023-11-16 PROCEDURE — 700102 HCHG RX REV CODE 250 W/ 637 OVERRIDE(OP): Performed by: NURSE PRACTITIONER

## 2023-11-16 PROCEDURE — 36415 COLL VENOUS BLD VENIPUNCTURE: CPT

## 2023-11-16 RX ORDER — POTASSIUM CHLORIDE 20 MEQ/1
40 TABLET, EXTENDED RELEASE ORAL 2 TIMES DAILY WITH MEALS
Status: COMPLETED | OUTPATIENT
Start: 2023-11-16 | End: 2023-11-16

## 2023-11-16 RX ADMIN — TAMSULOSIN HYDROCHLORIDE 0.4 MG: 0.4 CAPSULE ORAL at 08:32

## 2023-11-16 RX ADMIN — FAMOTIDINE 20 MG: 20 TABLET ORAL at 16:43

## 2023-11-16 RX ADMIN — GABAPENTIN 300 MG: 300 CAPSULE ORAL at 12:18

## 2023-11-16 RX ADMIN — CEFEPIME 2 G: 2 INJECTION, POWDER, FOR SOLUTION INTRAVENOUS at 22:16

## 2023-11-16 RX ADMIN — POTASSIUM CHLORIDE 40 MEQ: 1500 TABLET, EXTENDED RELEASE ORAL at 08:33

## 2023-11-16 RX ADMIN — CEFEPIME 2 G: 2 INJECTION, POWDER, FOR SOLUTION INTRAVENOUS at 06:08

## 2023-11-16 RX ADMIN — POTASSIUM CHLORIDE 40 MEQ: 1500 TABLET, EXTENDED RELEASE ORAL at 16:42

## 2023-11-16 RX ADMIN — FERROUS SULFATE TAB 325 MG (65 MG ELEMENTAL FE) 325 MG: 325 (65 FE) TAB at 06:02

## 2023-11-16 RX ADMIN — CEFEPIME 2 G: 2 INJECTION, POWDER, FOR SOLUTION INTRAVENOUS at 12:21

## 2023-11-16 RX ADMIN — GABAPENTIN 300 MG: 300 CAPSULE ORAL at 23:48

## 2023-11-16 RX ADMIN — GABAPENTIN 300 MG: 300 CAPSULE ORAL at 16:42

## 2023-11-16 RX ADMIN — FAMOTIDINE 20 MG: 20 TABLET ORAL at 06:02

## 2023-11-16 RX ADMIN — ATORVASTATIN CALCIUM 20 MG: 20 TABLET, FILM COATED ORAL at 16:43

## 2023-11-16 RX ADMIN — GABAPENTIN 300 MG: 300 CAPSULE ORAL at 06:01

## 2023-11-16 RX ADMIN — ACETAMINOPHEN 650 MG: 325 TABLET, FILM COATED ORAL at 08:32

## 2023-11-16 RX ADMIN — ENOXAPARIN SODIUM 40 MG: 100 INJECTION SUBCUTANEOUS at 08:33

## 2023-11-16 RX ADMIN — LEVOTHYROXINE SODIUM 25 MCG: 0.03 TABLET ORAL at 16:42

## 2023-11-16 RX ADMIN — Medication 2000 UNITS: at 06:01

## 2023-11-16 ASSESSMENT — COGNITIVE AND FUNCTIONAL STATUS - GENERAL
TOILETING: A LITTLE
DRESSING REGULAR UPPER BODY CLOTHING: A LITTLE
DAILY ACTIVITIY SCORE: 20
SUGGESTED CMS G CODE MODIFIER DAILY ACTIVITY: CJ
HELP NEEDED FOR BATHING: A LITTLE
DRESSING REGULAR LOWER BODY CLOTHING: A LITTLE

## 2023-11-16 NOTE — WOUND TEAM
[facility]  Inpatient Services  Wound and Skin Care Follow-up    Admission Date: 11/5/2023     Last order of IP CONSULT TO WOUND CARE was found on 11/11/2023 from Hospital Encounter on 11/5/2023     HPI, PMH, SH: Reviewed    Past Surgical History:   Procedure Laterality Date    CT EXPLORATORY OF ABDOMEN N/A 11/7/2023    Procedure: LAPAROTOMY, EXPLORATORY 2ND LOOK;  Surgeon: Tobi Locke M.D.;  Location: SURGERY [facility];  Service: General    WOUND CLOSURE NEURO N/A 11/7/2023    Procedure: CLOSURE, WOUND;  Surgeon: Tobi Locke M.D.;  Location: SURGERY [facility];  Service: General    BOWEL RESECTION  11/7/2023    Procedure: EXCISION, INTESTINE;  Surgeon: Tobi Locke M.D.;  Location: [facility];  Service: General    CT EXPLORATORY OF ABDOMEN  11/5/2023    Procedure: LAPAROTOMY, EXPLORATORY,  SMALL BOWEL RESECTION vac greater than 50cm, abthera placement;  Surgeon: Doroteo York D.O.;  Location: [facility];  Service: Gen Robotic    FUSION, SPINE, LUMBAR, PLIF  6/10/2019    Procedure: FUSION, SPINE, LUMBAR, TLIF L4-5;  Surgeon: Amado Marquez M.D.;  Location: [facility];  Service: Neurosurgery    LUMBAR LAMINECTOMY DISKECTOMY  6/10/2019    Procedure: LAMINECTOMY, SPINE, LUMBAR, WITH DISCECTOMY- REDO;  Surgeon: Amado Marquez M.D.;  Location: [facility];  Service: Neurosurgery    OTHER  2018    glaucoma surgery right and left eye    OTHER  2015    yag laser surgery right eye    OTHER  2014    mohs basal cell nasal    OTHER  2014    basal cell nasal    GYN SURGERY  2011    hysterectomy    OTHER ORTHOPEDIC SURGERY  2010    right thumb    OTHER ORTHOPEDIC SURGERY  2010    left thumb    OTHER  2010    right iol    OTHER ORTHOPEDIC SURGERY  2007    spine    OTHER ORTHOPEDIC SURGERY  2007    dural leak    OTHER ORTHOPEDIC SURGERY  2002    carpel tunnel    GYN SURGERY  1983    c sec     Social History     Tobacco Use    Smoking status: Never     Smokeless tobacco: Never   Substance Use Topics    Alcohol use: Yes     Comment: 1 daily     No chief complaint on file.    Diagnosis: Small bowel obstruction (HCC) [K56.609]    Unit where seen by Wound Team: [id number]/[id number]     WOUND FOLLOW UP RELATED TO:  Abdomen       WOUND TEAM PLAN OF CARE - Frequency of Follow-up:   Nursing to follow dressing orders written for wound care. 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 Pt had abdominal surgery 1.5 years prior for lysis of adhesions. Pt presented to [facility] 1 day prior to admission at [facility] and CT revealed small bowel obstruction. Pt was transferred to [facility] for higher level of care and was noted to have elevated WBC, as well as elevated lactic acid and creatinine in combination with concerning abdominal exam. Pt was therefore taken to OR on 11/5/23 for Ex lap and abthera placement. Pt returned to OR on 11/7/23 for fascia closure and black foam wound vac application. Wound team was subsequently consulted to manage NPWT.       WOUND ASSESSMENT/LDA  Wound 11/05/23 Full Thickness Wound Open Incision Abdomen (Active)   Date First Assessed/Time First Assessed: 11/05/23 1801   Primary Wound Type: Full Thickness Wound  Surgical Wound Type: Open Incision  Location: Abdomen      Assessments 11/15/2023  3:45 PM   Wound Image      Site Assessment Red;Drainage;Early/partial granulation   Periwound Assessment Clean;Dry;Intact   Margins Attached edges;Defined edges   Closure Secondary intention   Drainage Amount Small   Drainage Description  Serosanguineous   Treatments Cleansed;Site care;Nonselective debridement   Wound Cleansing Approved Wound Cleanser   Periwound Protectant No-sting Skin Prep;Drape   Dressing Status Intact;Clean;Dry   Dressing Changed Changed   Dressing Cleansing/Solutions Not Applicable   Dressing Options Wound Vac   Dressing Change/Treatment Frequency Monday, Wednesday, Friday, and As Needed   NEXT Dressing Change/Treatment Date 11/17/23   NEXT Weekly Photo (Inpatient Only) 11/22/23   Wound Team Following 3x Weekly   Non-staged Wound Description Full thickness   Wound Length (cm) 20.5 cm   Wound Width (cm) 4.9 cm   Wound Depth (cm) 2.1 cm   Wound Surface Area (cm^2) 100.45 cm^2   Wound Volume (cm^3) 210.945 cm^3   Wound Healing % 17   Shape Linear   Wound Odor None   WOUND NURSE ONLY - Time Spent with Patient (mins) 60       Negative Pressure Wound Therapy 11/05/23 Surgical Abdomen (Active)   Placement Date/Time: 11/05/23 1802   Inserted by: OR  Wound Type: Surgical  Location: Abdomen      Assessments 11/15/2023  3:45 PM   NPWT Pump Mode / Pressure Setting Continuous;Ulta;125 mmHg   Dressing Type Medium;Black Foam (Regular)   Number of Foam Pieces Used 3   Canister Changed No   NEXT Dressing Change/Treatment Date 11/17/23        Vascular:    BARB:   No results found.    Lab Values:    Lab Results   Component Value Date/Time    WBC 13.6 (H) 11/15/2023 07:54 AM    RBC 2.57 (L) 11/15/2023 07:54 AM    HEMOGLOBIN 8.1 (L) 11/15/2023 07:54 AM    HEMATOCRIT 25.2 (L) 11/15/2023 07:54 AM         Culture Results show:  No results found for this or any previous visit (from the past 720 hour(s)).    Pain Level/Medicated:  4% Lidocaine allowed to dwell on wound bed 30min prior and PO pain medications administered by bedside RN 30min prior       INTERVENTIONS BY WOUND TEAM:  Chart and images reviewed. Discussed with bedside RN. All areas of concern (based on picture review, LDA review and discussion with bedside RN) have been thoroughly assessed.  Documentation of areas based on significant findings. This RN in to assess patient. Performed standard wound care which includes appropriate positioning, dressing removal and non-selective debridement. Pictures and measurements obtained weekly if/when required.    Wound:  Abdomen  Preparation for Dressing removal: Removed without difficulty  Cleansed/Non-selectively Debrided with:  Wound cleanser and Gauze  Shira wound: Cleansed with Wound cleanser and Gauze, Prepped with No Sting and Drape  Primary Dressing:  One piece of spiraled full thickness black regular foam applied into wound bed, a 2nd piece of full thickness black regular foam was applied to distal end to fill wound depth. All foam secured with drape.   Secondary (Outer) Dressing: A hole was cut in drape and a 3rd final piece of half thickness circular black regular foam was applied as a button for trac pad. Trac pad applied and suction resumed. No leaks noted.    Advanced Wound Care Discharge Planning  Number of Clinicians necessary to complete wound care: 1  Is patient requiring IV pain medications for dressing changes:  No   Length of time for dressing change 30 min. (This does not include chart review, pre-medication time, set up, clean up or time spent charting.)    Interdisciplinary consultation: Patient, Bedside RN (Christina), Cielo SCOTT (Wound RN).  Pressure injury and staging reviewed with N/A.    EVALUATION / RATIONALE FOR TREATMENT:     Date:  11/15/23  Wound Status:  Wound progressing as expected    Wound continues to progress as expected still has significant depth at distal end. Continued with NPWT.    Date:  11/13/23  Wound Status:  Wound progressing as expected    Wound still with significant depth at distal end, however wound is clean. Continued with NPWT.    Date:  11/11/23  Wound Status:  Wound progressing as expected  Less adipose tissue exposed today, tissue still appears easily friable. May benefit from transition to Veraflo NPWT as wound  base becomes more robust if patient remains in-house (a veraflo kit and cassette are at bedside).  Plan is working on transferring back to [facility].    Date:  11/09/23  Wound Status:  Initial evaluation  Pt with fresh abdominal incision. Minimal granulation tissue noted that this point. Regular NPWT applied to assist in granular tissue development and wound closure.         Goals: Steady decrease in wound area and depth weekly.    NURSING PLAN OF CARE ORDERS:  No new orders this visit    NUTRITION RECOMMENDATIONS   Wound Team Recommendations:  N/A     DIET ORDERS (From admission to next 24h)       Start     Ordered    11/10/23 1641  Diet Order Diet: Regular  ALL MEALS        Question:  Diet:  Answer:  Regular    11/10/23 1640    11/10/23 1012  Supplements  ALL MEALS        Question:  Which Supplement  Answer:  OTHER (see comments)  Comment:  Ensure Clear    11/10/23 1012                  Anticipated discharge plans:  Home Health Care        Vac Discharge Needs:  Vac Discharge plan is purely a recommendation from wound team and not a requirement for discharge unless otherwise stated by physician.  Regular Vac in use and continued at discharge

## 2023-11-16 NOTE — PROGRESS NOTES
4 Eyes Skin Assessment Completed by JESUS Edmonds and JESUS Steinberg.    Head WDL  Ears WDL  Nose WDL  Mouth WDL  Neck WDL  Breast/Chest Redness, swelling to left breast  Shoulder Blades WDL  Spine WDL  (R) Arm/Elbow/Hand Edema  (L) Arm/Elbow/Hand Edema  Abdomen MLI with wound vac  Groin WDL  Scrotum/Coccyx/Buttocks WDL  (R) Leg Edema  (L) Leg Edema  (R) Heel/Foot/Toe Edema  (L) Heel/Foot/Toe Edema          Devices In Places Blood Pressure Cuff, Pulse Ox, and DIVINA's      Interventions In Place Sacral Mepilex, Waffle Overlay, TAP System, Pillows, Dri-Tyler Pads, Heels Loaded W/Pillows, and Pressure Redistribution Mattress    Possible Skin Injury No    Pictures Uploaded Into Epic N/A  Wound Consult Placed Yes, previously done  RN Wound Prevention Protocol Ordered Yes, previously done

## 2023-11-16 NOTE — CARE PLAN
The patient is Stable - Low risk of patient condition declining or worsening    Problem: Knowledge Deficit - Standard  Goal: Patient and family/care givers will demonstrate understanding of plan of care, disease process/condition, diagnostic tests and medications  Outcome: Progressing     Problem: Pain - Standard  Goal: Alleviation of pain or a reduction in pain to the patient’s comfort goal  Outcome: Progressing     Shift Goals  Clinical Goals: pain control, I&O, IV abx, monitor temp  Patient Goals: monitor headache and temperature, rest, comfort  Family Goals: Updates    Progress made toward(s) clinical / shift goals:  Patient's pain managed per MAR. Temperature monitored throughout shift. IV antibiotics administered per MAR. I&O monitored throughout shift. Patient able to rest during this shift.    Patient is not progressing towards the following goals:

## 2023-11-16 NOTE — PROGRESS NOTES
"  DATE: 11/16/2023    Hospital Day 11  small bowel obstruction & volvulus .    Postoperative Day # 11 Exploratory laparotomy, small bowel resection, & ABThera Placement.    Postoperative Day # 9 Exploratory laparotomy, small bowel resection, primary anastomosis, & negative pressure dressing application.    INTERVAL EVENTS:  WBC trend up to 15.2 this AM while on Cefepime.  Prelim blood cultures remain negative.  Covid & influenza negative.  Continues to remain afebrile & clinically non toxic.    - Left breast ultrasound ordered to rule out breast abscess  - Trend blood cultures  - Continue cefepime & repeat CBC in the AM  - Disposition: PT recommends discharge home once medically clear. Plan to discharge home with home health wound care and wound vac once medically clear.     PHYSICAL EXAMINATION:  Vital Signs: /74   Pulse 94   Temp 37.4 °C (99.4 °F) (Temporal)   Resp 18   Ht 1.626 m (5' 4.02\")   Wt 70.3 kg (154 lb 15.7 oz)   SpO2 95%     Alert & oriented, afebrile, no acute distress.  Unlabored respirations tolerating room air.  Bilateral lung fields clear to auscultation.   Abdomen soft, non distended, & non tender.  Midline abdominal incision with vac intact & functioning.  Tolerating regular diet, no nausea or vomiting.   + flatus Last BM 11/15  Bilateral lower extremity edema    LABORATORY VALUES:  Recent Labs     11/14/23  0752 11/15/23  0754 11/16/23  0516   WBC 17.0* 13.6* 15.2*   RBC 2.61* 2.57* 2.82*   HEMOGLOBIN 8.5* 8.1* 8.8*   HEMATOCRIT 25.6* 25.2* 28.4*   MCV 98.1* 98.1* 100.7*   MCH 32.6 31.5 31.2   MCHC 33.2 32.1* 31.0*   RDW 50.6* 50.0 51.2*   PLATELETCT 336 392 455*   MPV 8.8* 9.0 9.3     Recent Labs     11/14/23  0752 11/15/23  0754 11/16/23  0516   SODIUM 135 137 132*   POTASSIUM 4.0 3.9 3.3*   CHLORIDE 105 106 102   CO2 22 21 19*   GLUCOSE 101* 100* 98   BUN 8 8 9   CREATININE 0.60 0.70 0.75   CALCIUM 7.6* 8.0* 8.3*     IMAGING:  CT-CHEST,ABDOMEN,PELVIS WITH   Final Result      1. " Small bowel ileus, with patent ileocolic anastomosis.   2. Interpolar region right renal cortical cyst.   3. Moderate right hydronephrosis.   4. Extensive anasarca with moderate ascites in the paracolic gutters and pelvis.   5. Moderate bilateral pleural effusions with passive atelectasis in the lung bases.   6. No evidence for intra-abdominal abscess.      US-EXTREMITY VENOUS UPPER UNILAT LEFT   Final Result      US-TRAUMA VEIN SCREEN LOWER BILAT EXTREMITY   Final Result      DX-CHEST-PORTABLE (1 VIEW)   Final Result      Increased left pleural effusion with superimposed left basilar airspace disease versus atelectasis.      DX-CHEST-PORTABLE (1 VIEW)   Final Result         1.  Left basilar atelectasis, no focal infiltrate      DX-CHEST-PORTABLE (1 VIEW)   Final Result         1.  Left retrocardiac density suggesting subtle infiltrate.      DX-CHEST-PORTABLE (1 VIEW)   Final Result         1.  Left basilar atelectasis or early infiltrate.      EC-ECHOCARDIOGRAM COMPLETE W/ CONT   Final Result      DX-CHEST-PORTABLE (1 VIEW)   Final Result         1.  Left basilar atelectasis or early infiltrate.      DX-ABDOMEN FOR TUBE PLACEMENT   Final Result      1.  Enteric tube overlies the proximal stomach.      DX-CHEST-PORTABLE (1 VIEW)   Final Result      1.  There is no acute cardiopulmonary process.   2.  Satisfactory appearance of the tubes and lines.      US-EXTREMITY VENOUS LOWER BILAT   Final Result      OUTSIDE IMAGES-CT ABDOMEN /PELVIS   Final Result      OUTSIDE IMAGES-DX CHEST   Final Result      DX-CHEST-PORTABLE (1 VIEW)   Final Result      1.  There is no acute cardiopulmonary process.      US-BREAST COMPLETE-LEFT    (Results Pending)     ASSESSMENT AND PLAN:  * Small bowel obstruction (HCC)- (present on admission)  Assessment & Plan  11/5 Emergent Exploratory laparotomy with bowel resection.   Left in discontinuity / ABThera  11/7 Additional small bowel resected at ileum, and proximal anastomosis, fascial  closure.    11/10 Zosyn day 4 of 4 from source control and closure.    11/13 CT abdomen pelvis completed for leukocytosis without evidence of intraabdominal abscess     Leukocytosis- (present on admission)  Assessment & Plan  11/12 WBC trend up to 14.9  - Chest xray with increased left effusion and atelectasis   - Bilateral lower extremity duplex, urinalysis, C diff stool sample negative  11/13 WBC continue to trend up  - Swelling to left upper extremity, ultrasound with superficial thrombus  - CT chest / abdomen / pelvis with bilateral pleural effusions and atelectasis  11/14 WBC slight trend down  - Empiric vanco and cefepime initiated & blood cultures obtained  - MRSA swab negative, vanco discontinued  - Repeat UA negative  11/15 COVID & influenza negative  11/16 Left breast ultrasound ordered & pending    Urinary retention  Assessment & Plan  11/13 Arshad placed overnight for retention  - Add Flomax     Anemia  Assessment & Plan  On oral iron supplementation outpatient.  11/8 Iron studies low, replacement ordered per pharmacy.  11/11 Hemglobin 6.9, 1 unit packed red cells transfused. Outpatient ferrous sulfate resumed.  Trend hemograms and transfuse packed red cells for hemoglobin less than 7.0    Acute deep vein thrombosis (DVT) (HCC)- (present on admission)  Assessment & Plan  11/5 Acute/subacute occlusive thrombus in the left posterior tibial vein.    11/8 Pharmacological DVT prophylaxis, Lovenox 40 mg Q day initiated.   11/12 Trauma bilateral lower extremity duplex negative for DVT.    No contraindication to deep vein thrombosis (DVT) prophylaxis- (present on admission)  Assessment & Plan  11/8 Pharmacological DVT prophylaxis, Lovenox initiated     HLD (hyperlipidemia)- (present on admission)  Assessment & Plan  Chronic condition treated with atorvastatin.  Holding maintenance medication during acute septic illness.  11/10 atorvastatin resumed.    Hypothyroid- (present on admission)  Assessment &  Plan  Chronic condition treated with levothyroxine.  Holding maintenance medication during acute septic illness.  11/10 Resumed levothyroxine.         ____________________________________     LIOBRIO Stevenson.

## 2023-11-16 NOTE — CARE PLAN
Problem: Hyperinflation  Goal: Prevent or improve atelectasis  Description: Target End Date:  3 to 4 days    1. Instruct incentive spirometry usage  2.  Perform hyperinflation therapy as indicated  Outcome: Progressing   PEP BID

## 2023-11-16 NOTE — CARE PLAN
The patient is Stable - Low risk of patient condition declining or worsening    Shift Goals  Clinical Goals: pain control  Patient Goals: head ache control  Family Goals: Updates    Progress made toward(s) clinical / shift goals:  Educated patient on pain rating scales, frequent pain assessments in place, medicating per MAR, non pharmaceutical pain relief such as heat pads and positioning in use.        Patient is not progressing towards the following goals:

## 2023-11-16 NOTE — THERAPY
"Occupational Therapy  Daily Treatment     Patient Name: Rubi Haq  Age:  78 y.o., Sex:  female  Medical Record #: [id number]  Today's Date: 11/16/2023     Precautions: Fall Risk  Comments: Abdominal precautions, wound vac    Assessment    Pt seen for OT tx. Pt continues to demo progress towards OT goals. She is able to don slip-on shoes and pants with supv, but requires increased assist donning compression garments due to swelling in BLE. While voiding, pt reported an increase in pain and discomfort. RN notified and was able to collect a urine sample for further testing. Pt is eager to return home, but feels like as soon as one thing is addressed another arises delaying her DC. Will continue to follow for ongoing acute OT services.     Plan    Treatment Plan Status: Continue Current Treatment Plan  Type of Treatment: Self Care / Activities of Daily Living, Adaptive Equipment, Cognitive Skill Development, Neuro Re-Education / Balance, Manual Therapy Techniques, Therapeutic Exercises, Therapeutic Activity  Treatment Frequency: 4 Times per Week  Treatment Duration: Until Therapy Goals Met    DC Equipment Recommendations: Tub Transfer Bench  Discharge Recommendations: Recommend home health for continued occupational therapy services    Subjective    \"I am having an odd sensation right now [as pt was voiding on toilet].\"     Objective      Vitals   O2 Delivery Device None - Room Air   Pain 0 - 10 Group   Therapist Pain Assessment Post Activity Pain Same as Prior to Activity;Nurse Notified  (Not rated, agreeable to activtiy)   Non Verbal Descriptors   Non Verbal Scale  Calm;Unlabored Breathing   Cognition    Cognition / Consciousness WDL   Level of Consciousness Alert   Comments Very pleasant and eager to return home   Balance   Sitting Balance (Static) Fair +   Sitting Balance (Dynamic) Fair +   Standing Balance (Static) Fair   Standing Balance (Dynamic) Fair   Weight Shift Sitting Good   Weight Shift Standing " Good   Skilled Intervention Verbal Cuing   Comments w/FWW   Bed Mobility    Supine to Sit   (Up to chair pre)   Sit to Supine Supervised   Scooting Supervised   Rolling Supervised   Skilled Intervention Verbal Cuing;Compensatory Strategies   Comments HOB flat   Activities of Daily Living   Eating Modified Independent   Grooming Supervision;Standing  (Hand washing at sink)   Lower Body Dressing Standby Assist  (Don slip-on shoes)   Toileting Standby Assist  (Able to complete pericare and clothing management after voiding on standard toilet)   Skilled Intervention Verbal Cuing;Tactile Cuing   6 Clicks Daily Activity Score 20   Functional Mobility   Sit to Stand Supervised   Bed, Chair, Wheelchair Transfer Supervised   Toilet Transfers Supervised   Mobility Functional mobility in room w/FWW   Skilled Intervention Verbal Cuing   Activity Tolerance   Sitting in Chair Up to chair pre   Sitting Edge of Bed 5 min   Standing 12 min   Short Term Goals   Short Term Goal # 1 pt will complete UB dressing w/spv   Goal Outcome # 1 Progressing as expected   Short Term Goal # 2 B  Pt will complete ADL txfs with supv   Goal Outcome # 2 B Progressing as expected   Short Term Goal # 3 pt will complete LB dressing w/min A   Goal Outcome # 3 Progressing as expected   Education Group   Education Provided Role of Occupational Therapist   Role of Occupational Therapist Patient Response Patient;Acceptance;Explanation;Verbal Demonstration

## 2023-11-16 NOTE — WOUND TEAM
Assisted Tayo CHENG (Wound RN), with wound care, non-selective debridement performed using wound cleanser/NS and gauze. Please see Tayo CHEGN (Wound RN) wound note for further wound care details.

## 2023-11-16 NOTE — PROGRESS NOTES
Bedside report received.  Assessment complete.    A&O x 4. Patient calls appropriately.  Patient ambulates with standby assist with FWW.   Patient has 2/10 pain. No pharmacological interventions provided at this time.  Denies N&V. Tolerating regular diet, poor PO intake per pt.  MLI to abdomen with wound vac, patent, CDI.  + void, + flatus, + BM, last BM 11/15.  Patient denies SOB.    Review plan with of care with patient. Call light and personal belongings within reach. Hourly rounding in place. All needs met at this time.

## 2023-11-16 NOTE — CARE PLAN
Problem: Hyperinflation  Goal: Prevent or improve atelectasis  Description: Target End Date:  3 to 4 days    1. Instruct incentive spirometry usage  2.  Perform hyperinflation therapy as indicated  Outcome: Progressing   PEP BID   IS 1250  Mobilize pt

## 2023-11-17 LAB
ANION GAP SERPL CALC-SCNC: 11 MMOL/L (ref 7–16)
BASOPHILS # BLD AUTO: 0.4 % (ref 0–1.8)
BASOPHILS # BLD: 0.05 K/UL (ref 0–0.12)
BUN SERPL-MCNC: 8 MG/DL (ref 8–22)
CALCIUM SERPL-MCNC: 8.6 MG/DL (ref 8.5–10.5)
CHLORIDE SERPL-SCNC: 105 MMOL/L (ref 96–112)
CO2 SERPL-SCNC: 19 MMOL/L (ref 20–33)
CREAT SERPL-MCNC: 0.74 MG/DL (ref 0.5–1.4)
EOSINOPHIL # BLD AUTO: 0.28 K/UL (ref 0–0.51)
EOSINOPHIL NFR BLD: 2.2 % (ref 0–6.9)
ERYTHROCYTE [DISTWIDTH] IN BLOOD BY AUTOMATED COUNT: 49.8 FL (ref 35.9–50)
GFR SERPLBLD CREATININE-BSD FMLA CKD-EPI: 82 ML/MIN/1.73 M 2
GLUCOSE SERPL-MCNC: 99 MG/DL (ref 65–99)
HCT VFR BLD AUTO: 25.8 % (ref 37–47)
HGB BLD-MCNC: 8.2 G/DL (ref 12–16)
IMM GRANULOCYTES # BLD AUTO: 0.08 K/UL (ref 0–0.11)
IMM GRANULOCYTES NFR BLD AUTO: 0.6 % (ref 0–0.9)
LYMPHOCYTES # BLD AUTO: 1.32 K/UL (ref 1–4.8)
LYMPHOCYTES NFR BLD: 10.1 % (ref 22–41)
MCH RBC QN AUTO: 30.8 PG (ref 27–33)
MCHC RBC AUTO-ENTMCNC: 31.8 G/DL (ref 32.2–35.5)
MCV RBC AUTO: 97 FL (ref 81.4–97.8)
MONOCYTES # BLD AUTO: 1.04 K/UL (ref 0–0.85)
MONOCYTES NFR BLD AUTO: 8 % (ref 0–13.4)
NEUTROPHILS # BLD AUTO: 10.24 K/UL (ref 1.82–7.42)
NEUTROPHILS NFR BLD: 78.7 % (ref 44–72)
NRBC # BLD AUTO: 0 K/UL
NRBC BLD-RTO: 0 /100 WBC (ref 0–0.2)
PLATELET # BLD AUTO: 491 K/UL (ref 164–446)
PMV BLD AUTO: 9 FL (ref 9–12.9)
POTASSIUM SERPL-SCNC: 4.1 MMOL/L (ref 3.6–5.5)
RBC # BLD AUTO: 2.66 M/UL (ref 4.2–5.4)
SODIUM SERPL-SCNC: 135 MMOL/L (ref 135–145)
WBC # BLD AUTO: 13 K/UL (ref 4.8–10.8)

## 2023-11-17 PROCEDURE — 85025 COMPLETE CBC W/AUTO DIFF WBC: CPT

## 2023-11-17 PROCEDURE — 36415 COLL VENOUS BLD VENIPUNCTURE: CPT

## 2023-11-17 PROCEDURE — A9270 NON-COVERED ITEM OR SERVICE: HCPCS | Performed by: SURGERY

## 2023-11-17 PROCEDURE — 97602 WOUND(S) CARE NON-SELECTIVE: CPT

## 2023-11-17 PROCEDURE — 700101 HCHG RX REV CODE 250: Performed by: SURGERY

## 2023-11-17 PROCEDURE — 770001 HCHG ROOM/CARE - MED/SURG/GYN PRIV*

## 2023-11-17 PROCEDURE — 51798 US URINE CAPACITY MEASURE: CPT

## 2023-11-17 PROCEDURE — 700111 HCHG RX REV CODE 636 W/ 250 OVERRIDE (IP): Mod: JZ | Performed by: SURGERY

## 2023-11-17 PROCEDURE — 99024 POSTOP FOLLOW-UP VISIT: CPT

## 2023-11-17 PROCEDURE — 700102 HCHG RX REV CODE 250 W/ 637 OVERRIDE(OP)

## 2023-11-17 PROCEDURE — 700102 HCHG RX REV CODE 250 W/ 637 OVERRIDE(OP): Performed by: NURSE PRACTITIONER

## 2023-11-17 PROCEDURE — 80048 BASIC METABOLIC PNL TOTAL CA: CPT

## 2023-11-17 PROCEDURE — 97530 THERAPEUTIC ACTIVITIES: CPT

## 2023-11-17 PROCEDURE — A9270 NON-COVERED ITEM OR SERVICE: HCPCS | Performed by: NURSE PRACTITIONER

## 2023-11-17 PROCEDURE — 700102 HCHG RX REV CODE 250 W/ 637 OVERRIDE(OP): Performed by: SURGERY

## 2023-11-17 PROCEDURE — 97605 NEG PRS WND THER DME<=50SQCM: CPT

## 2023-11-17 PROCEDURE — A9270 NON-COVERED ITEM OR SERVICE: HCPCS

## 2023-11-17 PROCEDURE — 700105 HCHG RX REV CODE 258: Performed by: SURGERY

## 2023-11-17 RX ORDER — OXYCODONE HYDROCHLORIDE 5 MG/1
2.5 TABLET ORAL EVERY 4 HOURS PRN
Status: DISCONTINUED | OUTPATIENT
Start: 2023-11-17 | End: 2023-11-21 | Stop reason: HOSPADM

## 2023-11-17 RX ADMIN — GABAPENTIN 300 MG: 300 CAPSULE ORAL at 11:09

## 2023-11-17 RX ADMIN — ATORVASTATIN CALCIUM 20 MG: 20 TABLET, FILM COATED ORAL at 16:58

## 2023-11-17 RX ADMIN — CEFEPIME 2 G: 2 INJECTION, POWDER, FOR SOLUTION INTRAVENOUS at 13:43

## 2023-11-17 RX ADMIN — ACETAMINOPHEN 650 MG: 325 TABLET, FILM COATED ORAL at 19:57

## 2023-11-17 RX ADMIN — LEVOTHYROXINE SODIUM 25 MCG: 0.03 TABLET ORAL at 16:59

## 2023-11-17 RX ADMIN — TAMSULOSIN HYDROCHLORIDE 0.4 MG: 0.4 CAPSULE ORAL at 08:40

## 2023-11-17 RX ADMIN — Medication 2000 UNITS: at 06:38

## 2023-11-17 RX ADMIN — ENOXAPARIN SODIUM 40 MG: 100 INJECTION SUBCUTANEOUS at 08:40

## 2023-11-17 RX ADMIN — CEFEPIME 2 G: 2 INJECTION, POWDER, FOR SOLUTION INTRAVENOUS at 06:39

## 2023-11-17 RX ADMIN — FAMOTIDINE 20 MG: 20 TABLET ORAL at 06:38

## 2023-11-17 RX ADMIN — GABAPENTIN 300 MG: 300 CAPSULE ORAL at 06:38

## 2023-11-17 RX ADMIN — GABAPENTIN 300 MG: 300 CAPSULE ORAL at 16:59

## 2023-11-17 RX ADMIN — FAMOTIDINE 20 MG: 20 TABLET ORAL at 16:59

## 2023-11-17 RX ADMIN — GABAPENTIN 300 MG: 300 CAPSULE ORAL at 23:32

## 2023-11-17 RX ADMIN — FERROUS SULFATE TAB 325 MG (65 MG ELEMENTAL FE) 325 MG: 325 (65 FE) TAB at 06:38

## 2023-11-17 RX ADMIN — OXYCODONE 2.5 MG: 5 TABLET ORAL at 09:43

## 2023-11-17 RX ADMIN — LIDOCAINE HYDROCHLORIDE 1 APPLICATION: 40 SOLUTION TOPICAL at 10:04

## 2023-11-17 ASSESSMENT — GAIT ASSESSMENTS
GAIT LEVEL OF ASSIST: SUPERVISED
ASSISTIVE DEVICE: FRONT WHEEL WALKER
DISTANCE (FEET): 100
DEVIATION: BRADYKINETIC;DECREASED HEEL STRIKE;DECREASED TOE OFF

## 2023-11-17 NOTE — PROGRESS NOTES
4 Eyes Skin Assessment Completed by Juan RN and JESUS Orellana.    Head WDL  Ears WDL  Nose WDL  Mouth WDL  Neck WDL  Breast/Chest Redness  Shoulder Blades WDL  Spine WDL  (R) Arm/Elbow/Hand WDL  (L) Arm/Elbow/Hand WDL  Abdomen Incision  Groin WDL  Scrotum/Coccyx/Buttocks WDL  (R) Leg Edema  (L) Leg Edema  (R) Heel/Foot/Toe Edema  (L) Heel/Foot/Toe Edema          Devices In Places Pulse Ox, Abd wound vac, RFA IV      Interventions In Place Pillows and Low Air Loss Mattress    Possible Skin Injury No    Pictures Uploaded Into Epic N/A  Wound Consult Placed N/A  RN Wound Prevention Protocol Ordered No

## 2023-11-17 NOTE — WOUND TEAM
[facility]  Inpatient Services  Wound and Skin Care Follow-up    Admission Date: 11/5/2023     Last order of IP CONSULT TO WOUND CARE was found on 11/11/2023 from Hospital Encounter on 11/5/2023     HPI, PMH, SH: Reviewed    Past Surgical History:   Procedure Laterality Date    TN EXPLORATORY OF ABDOMEN N/A 11/7/2023    Procedure: LAPAROTOMY, EXPLORATORY 2ND LOOK;  Surgeon: Tobi Locke M.D.;  Location: SURGERY [facility];  Service: General    WOUND CLOSURE NEURO N/A 11/7/2023    Procedure: CLOSURE, WOUND;  Surgeon: Tobi Locke M.D.;  Location: SURGERY [facility];  Service: General    BOWEL RESECTION  11/7/2023    Procedure: EXCISION, INTESTINE;  Surgeon: Tobi Locke M.D.;  Location: [facility];  Service: General    TN EXPLORATORY OF ABDOMEN  11/5/2023    Procedure: LAPAROTOMY, EXPLORATORY,  SMALL BOWEL RESECTION vac greater than 50cm, abthera placement;  Surgeon: Doroteo York D.O.;  Location: [facility];  Service: Gen Robotic    FUSION, SPINE, LUMBAR, PLIF  6/10/2019    Procedure: FUSION, SPINE, LUMBAR, TLIF L4-5;  Surgeon: Amado Marquez M.D.;  Location: [facility];  Service: Neurosurgery    LUMBAR LAMINECTOMY DISKECTOMY  6/10/2019    Procedure: LAMINECTOMY, SPINE, LUMBAR, WITH DISCECTOMY- REDO;  Surgeon: Amado Marquez M.D.;  Location: [facility];  Service: Neurosurgery    OTHER  2018    glaucoma surgery right and left eye    OTHER  2015    yag laser surgery right eye    OTHER  2014    mohs basal cell nasal    OTHER  2014    basal cell nasal    GYN SURGERY  2011    hysterectomy    OTHER ORTHOPEDIC SURGERY  2010    right thumb    OTHER ORTHOPEDIC SURGERY  2010    left thumb    OTHER  2010    right iol    OTHER ORTHOPEDIC SURGERY  2007    spine    OTHER ORTHOPEDIC SURGERY  2007    dural leak    OTHER ORTHOPEDIC SURGERY  2002    carpel tunnel    GYN SURGERY  1983    c sec     Social History     Tobacco Use    Smoking status: Never     Smokeless tobacco: Never   Substance Use Topics    Alcohol use: Yes     Comment: 1 daily     No chief complaint on file.    Diagnosis: Small bowel obstruction (HCC) [K56.609]    Unit where seen by Wound Team: T434/02     WOUND FOLLOW UP RELATED TO:  Mid Abdomen       WOUND TEAM PLAN OF CARE - Frequency of Follow-up:   Nursing to follow dressing orders written for wound care. 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 Pt had abdominal surgery 1.5 years prior for lysis of adhesions. Pt presented to [facility] 1 day prior to admission at [facility] and CT revealed small bowel obstruction. Pt was transferred to [facility] for higher level of care and was noted to have elevated WBC, as well as elevated lactic acid and creatinine in combination with concerning abdominal exam. Pt was therefore taken to OR on 11/5/23 for Ex lap and abthera placement. Pt returned to OR on 11/7/23 for fascia closure and black foam wound vac application. Wound team was subsequently consulted to manage NPWT.       WOUND ASSESSMENT/LDA  Wound 11/05/23 Full Thickness Wound Open Incision Abdomen (Active)   Date First Assessed/Time First Assessed: 11/05/23 1801   Primary Wound Type: Full Thickness Wound  Surgical Wound Type: Open Incision  Location: Abdomen      Assessments 11/17/2023  1:40 PM   Site Assessment Red;Drainage;Granulation tissue   Periwound Assessment Clean;Dry;Intact   Margins Attached edges;Defined edges   Closure Secondary intention   Drainage Amount Small   Drainage Description Serosanguineous    Treatments Cleansed;Nonselective debridement;Site care   Wound Cleansing Approved Wound Cleanser   Periwound Protectant No-sting Skin Prep;Drape   Dressing Status Clean;Dry;Intact   Dressing Changed Changed   Dressing Cleansing/Solutions Not Applicable   Dressing Options Wound Vac   Dressing Change/Treatment Frequency Monday, Wednesday, Friday, and As Needed   NEXT Dressing Change/Treatment Date 11/20/23   NEXT Weekly Photo (Inpatient Only) 11/22/23   Wound Team Following 3x Weekly   Non-staged Wound Description Full thickness   Shape Linear   Wound Odor None   WOUND NURSE ONLY - Time Spent with Patient (mins) 60       Negative Pressure Wound Therapy 11/05/23 Surgical Abdomen (Active)   Placement Date/Time: 11/05/23 1802   Inserted by: OR  Wound Type: Surgical  Location: Abdomen      Assessments 11/17/2023  1:40 PM   NPWT Pump Mode / Pressure Setting Ulta;Continuous;125 mmHg   Dressing Type Medium;Black Foam (Regular)   Number of Foam Pieces Used 3   Canister Changed No   NEXT Dressing Change/Treatment Date 11/20/23        Vascular:    BARB:   No results found.    Lab Values:    Lab Results   Component Value Date/Time    WBC 13.0 (H) 11/17/2023 06:05 AM    RBC 2.66 (L) 11/17/2023 06:05 AM    HEMOGLOBIN 8.2 (L) 11/17/2023 06:05 AM    HEMATOCRIT 25.8 (L) 11/17/2023 06:05 AM         Culture Results show:  No results found for this or any previous visit (from the past 720 hour(s)).    Pain Level/Medicated:  4% Lidocaine allowed to dwell on wound bed 120min prior and PO pain medications administered by bedside RN 120min prior       INTERVENTIONS BY WOUND TEAM:  Chart and images reviewed. Discussed with bedside RN. All areas of concern (based on picture review, LDA review and discussion with bedside RN) have been thoroughly assessed. Documentation of areas based on significant findings. This RN in to assess patient. Performed standard wound care which includes appropriate positioning, dressing removal and non-selective  debridement. Pictures and measurements obtained weekly if/when required.    Wound:  Mid Abdomen  Preparation for Dressing removal: Dressing soaked with Lidocaine  Cleansed/Non-selectively Debrided with:  Wound cleanser and Gauze  Shira wound: Cleansed with Wound cleanser and Gauze, Prepped with No Sting and Drape  Primary Dressing:  One piece of full thickness black regular foam was applied into wound bed and secured with drape.   Secondary (Outer) Dressing: A hole was cut in drape and a final piece of half thickness circular black foam was applied as a button for trac pad. Trac pad applied and suction resumed. No leaks noted.    Advanced Wound Care Discharge Planning  Number of Clinicians necessary to complete wound care: 1  Is patient requiring IV pain medications for dressing changes:  No   Length of time for dressing change 30 min. (This does not include chart review, pre-medication time, set up, clean up or time spent charting.)    Interdisciplinary consultation: Patient, Bedside RN (Esteban), Cielo SCOTT (Wound RN).  Pressure injury and staging reviewed with N/A.    EVALUATION / RATIONALE FOR TREATMENT:     Date:  11/17/23  Wound Status:  Wound progressing as expected    Wound continues to progress, minimal change from previous assessment. Continued with regular NPWT.     Date:  11/15/23  Wound Status:  Wound progressing as expected    Wound continues to progress as expected still has significant depth at distal end. Continued with NPWT.    Date:  11/13/23  Wound Status:  Wound progressing as expected    Wound still with significant depth at distal end, however wound is clean. Continued with NPWT.    Date:  11/11/23  Wound Status:  Wound progressing as expected  Less adipose tissue exposed today, tissue still appears easily friable. May benefit from transition to Veraflo NPWT as wound base becomes more robust if patient remains in-house (a veraflo kit and cassette are at bedside).  Plan is working on transferring  back to [facility].    Date:  11/09/23  Wound Status:  Initial evaluation  Pt with fresh abdominal incision. Minimal granulation tissue noted that this point. Regular NPWT applied to assist in granular tissue development and wound closure.         Goals: Steady decrease in wound area and depth weekly.    NURSING PLAN OF CARE ORDERS:  No new orders this visit    NUTRITION RECOMMENDATIONS   Wound Team Recommendations:  N/A     DIET ORDERS (From admission to next 24h)       Start     Ordered    11/10/23 1641  Diet Order Diet: Regular  ALL MEALS        Question:  Diet:  Answer:  Regular    11/10/23 1640    11/10/23 1012  Supplements  ALL MEALS        Question:  Which Supplement  Answer:  OTHER (see comments)  Comment:  Ensure Clear    11/10/23 1012                  Anticipated discharge plans:  Home Health Care        Vac Discharge Needs:  Vac Discharge plan is purely a recommendation from wound team and not a requirement for discharge unless otherwise stated by physician.  Regular Vac in use and continued at discharge

## 2023-11-17 NOTE — CARE PLAN
The patient is Stable - Low risk of patient condition declining or worsening    Problem: Knowledge Deficit - Standard  Goal: Patient and family/care givers will demonstrate understanding of plan of care, disease process/condition, diagnostic tests and medications  Outcome: Progressing     Problem: Pain - Standard  Goal: Alleviation of pain or a reduction in pain to the patient’s comfort goal  Outcome: Progressing     Shift Goals  Clinical Goals: pain control, IV abx  Patient Goals: pain control, comfort, rest  Family Goals: Updates    Progress made toward(s) clinical / shift goals:  Patient's pain managed per MAR. IV antibiotics administered per MAR. Patient able to rest during this shift.    Patient is not progressing towards the following goals:

## 2023-11-17 NOTE — PROGRESS NOTES
Assumed care of patient at 0645. Bedside report received from Kendal LEE. Assessment complete.  AA&Ox4. Denies CP/SOB.  Reporting 2/10 pain. Declined intervention at this time. Assisted Pt to toilet, changed linens, and repositioned in bed.  Educated patient regarding pharmacologic and non pharmacologic modalities for pain management.  Skin per flowsheets  Tolerating Regular diet. Denies N/V.  + void. + BM. Last BM 11/17/23  Pt ambulates 15 feet, tolerates well.  All needs met at this time. Call light within reach. Pt calls appropriately. Bed low and locked, non skid socks in place. Hourly rounding in place.

## 2023-11-17 NOTE — PROGRESS NOTES
4 Eyes Skin Assessment Completed by JESUS Orellana and JESUS Martinez.     Head WDL  Ears WDL  Nose WDL  Mouth WDL  Neck WDL  Breast/Chest Swelling to L Breast   Shoulder Blades WDL  Spine WDL  (R) Arm/Elbow/Hand WDL  (L) Arm/Elbow/Hand WDL  Abdomen Midline Incision Closed w Wound Vac, Wound Vac Patent, No Leak Noted   Groin Redness, Excoriation to Shira-Vaginal Area   Coccyx/Buttocks WDL  (R) Leg Pitting Edema  (L) Leg Pitting Edema  (R) Heel/Foot/Toe Swelling  (L) Heel/Foot/Toe Swelling              Devices In Places Blood Pressure Cuff, Pulse Ox, Wound Vac, Compression Stockings         Interventions In Place Sacral Mepilex, Waffle Overlay, TAP System, Pillows, Dri-Tyler Pads, Heels Loaded W/Pillows, and Low Air Loss Mattress     Possible Skin Injury No     Pictures Uploaded Into Epic N/A  Wound Consult Placed Yes, previously done  RN Wound Prevention Protocol Ordered Yes, previously done

## 2023-11-17 NOTE — PROGRESS NOTES
"  DATE: 11/17/2023    Hospital Day 12  small bowel obstruction & volvulus .    Postoperative Day # 12 Exploratory laparotomy, small bowel resection, & ABThera Placement.    Postoperative Day # 10 Exploratory laparotomy, small bowel resection, primary anastomosis, & negative pressure dressing application.    INTERVAL EVENTS:  WBC trend down to 13.0 this AM on Cefepime.  Prelim blood cultures remain negative.  Continues to remain afebrile & clinically non toxic.    - Trend blood cultures  - Continue cefepime & repeat CBC in the AM  - Disposition: PT recommends discharge home once medically clear. Plan to discharge home with home health wound care and wound vac once medically clear.     PHYSICAL EXAMINATION:  Vital Signs: BP (!) 166/85   Pulse 89   Temp 36.3 °C (97.3 °F) (Temporal)   Resp 16   Ht 1.626 m (5' 4.02\")   Wt 70.3 kg (154 lb 15.7 oz)   SpO2 95%     Alert & oriented, afebrile, no acute distress.  Unlabored respirations tolerating room air.  Abdomen soft, non distended, & non tender.  Midline abdominal incision with vac intact & functioning.  Tolerating regular diet, no nausea or vomiting.   + flatus Last BM 11/16    LABORATORY VALUES:  Recent Labs     11/15/23  0754 11/16/23  0516 11/17/23  0605   WBC 13.6* 15.2* 13.0*   RBC 2.57* 2.82* 2.66*   HEMOGLOBIN 8.1* 8.8* 8.2*   HEMATOCRIT 25.2* 28.4* 25.8*   MCV 98.1* 100.7* 97.0   MCH 31.5 31.2 30.8   MCHC 32.1* 31.0* 31.8*   RDW 50.0 51.2* 49.8   PLATELETCT 392 455* 491*   MPV 9.0 9.3 9.0     Recent Labs     11/15/23  0754 11/16/23  0516 11/17/23  0605   SODIUM 137 132* 135   POTASSIUM 3.9 3.3* 4.1   CHLORIDE 106 102 105   CO2 21 19* 19*   GLUCOSE 100* 98 99   BUN 8 9 8   CREATININE 0.70 0.75 0.74   CALCIUM 8.0* 8.3* 8.6     IMAGING:  US-CHEST   Final Result      Left breast subcutaneous edema, without drainable fluid collection.      CT-CHEST,ABDOMEN,PELVIS WITH   Final Result      1. Small bowel ileus, with patent ileocolic anastomosis.   2. Interpolar " region right renal cortical cyst.   3. Moderate right hydronephrosis.   4. Extensive anasarca with moderate ascites in the paracolic gutters and pelvis.   5. Moderate bilateral pleural effusions with passive atelectasis in the lung bases.   6. No evidence for intra-abdominal abscess.      US-EXTREMITY VENOUS UPPER UNILAT LEFT   Final Result      US-TRAUMA VEIN SCREEN LOWER BILAT EXTREMITY   Final Result      DX-CHEST-PORTABLE (1 VIEW)   Final Result      Increased left pleural effusion with superimposed left basilar airspace disease versus atelectasis.      DX-CHEST-PORTABLE (1 VIEW)   Final Result         1.  Left basilar atelectasis, no focal infiltrate      DX-CHEST-PORTABLE (1 VIEW)   Final Result         1.  Left retrocardiac density suggesting subtle infiltrate.      DX-CHEST-PORTABLE (1 VIEW)   Final Result         1.  Left basilar atelectasis or early infiltrate.      EC-ECHOCARDIOGRAM COMPLETE W/ CONT   Final Result      DX-CHEST-PORTABLE (1 VIEW)   Final Result         1.  Left basilar atelectasis or early infiltrate.      DX-ABDOMEN FOR TUBE PLACEMENT   Final Result      1.  Enteric tube overlies the proximal stomach.      DX-CHEST-PORTABLE (1 VIEW)   Final Result      1.  There is no acute cardiopulmonary process.   2.  Satisfactory appearance of the tubes and lines.      US-EXTREMITY VENOUS LOWER BILAT   Final Result      OUTSIDE IMAGES-CT ABDOMEN /PELVIS   Final Result      OUTSIDE IMAGES-DX CHEST   Final Result      DX-CHEST-PORTABLE (1 VIEW)   Final Result      1.  There is no acute cardiopulmonary process.        ASSESSMENT AND PLAN:  * Small bowel obstruction (HCC)- (present on admission)  Assessment & Plan  11/5 Emergent Exploratory laparotomy with bowel resection.   Left in discontinuity / ABThera  11/7 Additional small bowel resected at ileum, and proximal anastomosis, fascial closure.    11/10 Zosyn day 4 of 4 from source control and closure.    11/13 CT abdomen pelvis completed for  leukocytosis without evidence of intraabdominal abscess     Leukocytosis- (present on admission)  Assessment & Plan  11/12 WBC trend up to 14.9  - Chest xray with increased left effusion and atelectasis   - Bilateral lower extremity duplex, urinalysis, C diff stool sample negative  11/13 WBC continue to trend up  - Swelling to left upper extremity, ultrasound with superficial thrombus  - CT chest / abdomen / pelvis with bilateral pleural effusions and atelectasis  11/14 WBC slight trend down  - Empiric vanco and cefepime initiated & blood cultures obtained  - MRSA swab negative, vanco discontinued  - Repeat UA negative  11/15 COVID & influenza negative  11/16 Left breast ultrasound ordered & pending    Urinary retention  Assessment & Plan  11/13 Arshad placed overnight for retention  - Add Flomax     Anemia  Assessment & Plan  On oral iron supplementation outpatient.  11/8 Iron studies low, replacement ordered per pharmacy.  11/11 Hemglobin 6.9, 1 unit packed red cells transfused. Outpatient ferrous sulfate resumed.  Trend hemograms and transfuse packed red cells for hemoglobin less than 7.0    Acute deep vein thrombosis (DVT) (HCC)- (present on admission)  Assessment & Plan  11/5 Acute/subacute occlusive thrombus in the left posterior tibial vein.    11/8 Pharmacological DVT prophylaxis, Lovenox 40 mg Q day initiated.   11/12 Trauma bilateral lower extremity duplex negative for DVT.    No contraindication to deep vein thrombosis (DVT) prophylaxis- (present on admission)  Assessment & Plan  11/8 Pharmacological DVT prophylaxis, Lovenox initiated     HLD (hyperlipidemia)- (present on admission)  Assessment & Plan  Chronic condition treated with atorvastatin.  Holding maintenance medication during acute septic illness.  11/10 atorvastatin resumed.    Hypothyroid- (present on admission)  Assessment & Plan  Chronic condition treated with levothyroxine.  Holding maintenance medication during acute septic illness.  11/10  Resumed levothyroxine.         ____________________________________     YESICA Stevenson

## 2023-11-17 NOTE — PROGRESS NOTES
Bedside report received.  Assessment complete.     A&O x 4. Patient calls appropriately.  Patient ambulates with standby assist with FWW.   Patient has 2/10 pain. No pharmacological interventions provided at this time.  Denies N&V. Tolerating regular diet, poor PO intake per pt.  MLI to abdomen with wound vac, patent, CDI.  + void, + flatus, + BM, last BM 11/16.  Patient denies SOB.     Review plan with of care with patient. Call light and personal belongings within reach. Hourly rounding in place. All needs met at this time.

## 2023-11-17 NOTE — THERAPY
Physical Therapy Contact Note    Patient Name: Rubi Haq  Age:  78 y.o., Sex:  female  Medical Record #: [id number]  Today's Date: 11/16/2023    Attempted to see pt for PT session. Pt fatigued from multiple bouts of mobility earlier in the afternoon. Will follow up as able.     Shayna Acharya, PT, DPT

## 2023-11-17 NOTE — DISCHARGE PLANNING
Case Management Discharge Planning    Admission Date: 11/5/2023  GMLOS: 9.9  ALOS: 12    6-Clicks ADL Score: 20  6-Clicks Mobility Score: 18      Anticipated Discharge Dispo: Discharge Disposition: D/T to short term gen hosp for IP care w/ planned IP readmit (82)  Discharge Address: daughter's home [address] [place] [address]  Discharge Contact Phone Number: correct on face sheet    DME Needed: Yes    DME Ordered: In process of ordering Wound Vac    Action(s) Taken: LMSW received choice for HH. LMSW working with APRN on wound vac paperwork to send to [facility]    Escalations Completed: None    Medically Clear: No    Next Steps: Follow up with [facility] and HH agency.     Barriers to Discharge: Medical clearance    0246 Update  Wound Vac paperwork sent to [facility].   Received by [facility].  APRN is hopeful for DC in 2 days, 11/19.  LMSW to follow.

## 2023-11-17 NOTE — PROGRESS NOTES
Pt complaint of urinary frequency.  Performed bladder scan and found 430ml of post void residual volume.  Performed straight cath per order and drained 550ml of clear, straw colored urine.  Provided pericare post catheter and educated patient on the use of the straight catheter to relieve urinary retention.  Educated the patient on physiologic function of bladder and advised patient to call this RN after her next void to check for post void residual volume.

## 2023-11-17 NOTE — CARE PLAN
The patient is Stable - Low risk of patient condition declining or worsening    Shift Goals  Clinical Goals: Urinary elimination, Pain Management, discharge  Patient Goals: Pain control, Urinary elimination, rest  Family Goals: Updates    Progress made toward(s) clinical / shift goals:  Pt ambulated independently to toilet and can adjust position independently.  Pt was educated on the causes of urinary retention and treatments provided.    Patient is not progressing towards the following goals: Pt requires further follow up on mobility and progression of care        Problem: Fluid Volume  Goal: Fluid volume balance will be maintained  Outcome: Progressing     Problem: Urinary - Renal Perfusion  Goal: Ability to achieve and maintain adequate renal perfusion and functioning will improve  Outcome: Progressing     Problem: Skin Integrity  Goal: Skin integrity is maintained or improved  Outcome: Progressing

## 2023-11-17 NOTE — PROGRESS NOTES
4 Eyes Skin Assessment Completed by JESUS Edmonds and JESUS Steinberg.     Head WDL  Ears WDL  Nose WDL  Mouth WDL  Neck WDL  Breast/Chest slight swelling to left breast  Shoulder Blades WDL  Spine WDL  (R) Arm/Elbow/Hand WDL  (L) Arm/Elbow/Hand WDL  Abdomen MLI with wound vac, patent, CDI  Groin WDL  Scrotum/Coccyx/Buttocks WDL  (R) Leg Edema  (L) Leg Edema  (R) Heel/Foot/Toe Edema  (L) Heel/Foot/Toe Edema              Devices In Places Blood Pressure Cuff, Pulse Ox, and DIVINA's        Interventions In Place Sacral Mepilex, Waffle Overlay, TAP System, Pillows, Dri-Tyler Pads, Heels Loaded W/Pillows, and Pressure Redistribution Mattress     Possible Skin Injury No     Pictures Uploaded Into Epic N/A  Wound Consult Placed Yes, previously done  RN Wound Prevention Protocol Ordered Yes, previously done

## 2023-11-18 LAB
ANION GAP SERPL CALC-SCNC: 10 MMOL/L (ref 7–16)
BASOPHILS # BLD AUTO: 0.5 % (ref 0–1.8)
BASOPHILS # BLD: 0.05 K/UL (ref 0–0.12)
BUN SERPL-MCNC: 8 MG/DL (ref 8–22)
CALCIUM SERPL-MCNC: 8.5 MG/DL (ref 8.5–10.5)
CHLORIDE SERPL-SCNC: 103 MMOL/L (ref 96–112)
CO2 SERPL-SCNC: 21 MMOL/L (ref 20–33)
CREAT SERPL-MCNC: 0.75 MG/DL (ref 0.5–1.4)
EOSINOPHIL # BLD AUTO: 0.32 K/UL (ref 0–0.51)
EOSINOPHIL NFR BLD: 3 % (ref 0–6.9)
ERYTHROCYTE [DISTWIDTH] IN BLOOD BY AUTOMATED COUNT: 52.5 FL (ref 35.9–50)
GFR SERPLBLD CREATININE-BSD FMLA CKD-EPI: 80 ML/MIN/1.73 M 2
GLUCOSE SERPL-MCNC: 133 MG/DL (ref 65–99)
HCT VFR BLD AUTO: 24.4 % (ref 37–47)
HGB BLD-MCNC: 7.6 G/DL (ref 12–16)
IMM GRANULOCYTES # BLD AUTO: 0.06 K/UL (ref 0–0.11)
IMM GRANULOCYTES NFR BLD AUTO: 0.6 % (ref 0–0.9)
LYMPHOCYTES # BLD AUTO: 1.55 K/UL (ref 1–4.8)
LYMPHOCYTES NFR BLD: 14.6 % (ref 22–41)
MCH RBC QN AUTO: 31.7 PG (ref 27–33)
MCHC RBC AUTO-ENTMCNC: 31.1 G/DL (ref 32.2–35.5)
MCV RBC AUTO: 101.7 FL (ref 81.4–97.8)
MONOCYTES # BLD AUTO: 1.04 K/UL (ref 0–0.85)
MONOCYTES NFR BLD AUTO: 9.8 % (ref 0–13.4)
NEUTROPHILS # BLD AUTO: 7.6 K/UL (ref 1.82–7.42)
NEUTROPHILS NFR BLD: 71.5 % (ref 44–72)
NRBC # BLD AUTO: 0 K/UL
NRBC BLD-RTO: 0 /100 WBC (ref 0–0.2)
PLATELET # BLD AUTO: 473 K/UL (ref 164–446)
PMV BLD AUTO: 8.9 FL (ref 9–12.9)
POTASSIUM SERPL-SCNC: 3.4 MMOL/L (ref 3.6–5.5)
RBC # BLD AUTO: 2.4 M/UL (ref 4.2–5.4)
SODIUM SERPL-SCNC: 134 MMOL/L (ref 135–145)
WBC # BLD AUTO: 10.6 K/UL (ref 4.8–10.8)

## 2023-11-18 PROCEDURE — 700105 HCHG RX REV CODE 258

## 2023-11-18 PROCEDURE — 99024 POSTOP FOLLOW-UP VISIT: CPT

## 2023-11-18 PROCEDURE — 700102 HCHG RX REV CODE 250 W/ 637 OVERRIDE(OP)

## 2023-11-18 PROCEDURE — A9270 NON-COVERED ITEM OR SERVICE: HCPCS

## 2023-11-18 PROCEDURE — 36415 COLL VENOUS BLD VENIPUNCTURE: CPT

## 2023-11-18 PROCEDURE — 770001 HCHG ROOM/CARE - MED/SURG/GYN PRIV*

## 2023-11-18 PROCEDURE — 700111 HCHG RX REV CODE 636 W/ 250 OVERRIDE (IP): Mod: JZ

## 2023-11-18 PROCEDURE — 700111 HCHG RX REV CODE 636 W/ 250 OVERRIDE (IP): Mod: JZ | Performed by: SURGERY

## 2023-11-18 PROCEDURE — 80048 BASIC METABOLIC PNL TOTAL CA: CPT

## 2023-11-18 PROCEDURE — 700102 HCHG RX REV CODE 250 W/ 637 OVERRIDE(OP): Performed by: NURSE PRACTITIONER

## 2023-11-18 PROCEDURE — A9270 NON-COVERED ITEM OR SERVICE: HCPCS | Performed by: NURSE PRACTITIONER

## 2023-11-18 PROCEDURE — 51798 US URINE CAPACITY MEASURE: CPT

## 2023-11-18 PROCEDURE — 85025 COMPLETE CBC W/AUTO DIFF WBC: CPT

## 2023-11-18 RX ORDER — AMOXICILLIN AND CLAVULANATE POTASSIUM 875; 125 MG/1; MG/1
1 TABLET, FILM COATED ORAL EVERY 12 HOURS
Status: COMPLETED | OUTPATIENT
Start: 2023-11-18 | End: 2023-11-20

## 2023-11-18 RX ADMIN — GABAPENTIN 300 MG: 300 CAPSULE ORAL at 23:26

## 2023-11-18 RX ADMIN — AMOXICILLIN AND CLAVULANATE POTASSIUM 1 TABLET: 875; 125 TABLET, FILM COATED ORAL at 17:04

## 2023-11-18 RX ADMIN — GABAPENTIN 300 MG: 300 CAPSULE ORAL at 17:04

## 2023-11-18 RX ADMIN — OXYCODONE 2.5 MG: 5 TABLET ORAL at 12:54

## 2023-11-18 RX ADMIN — Medication 2000 UNITS: at 04:28

## 2023-11-18 RX ADMIN — ATORVASTATIN CALCIUM 20 MG: 20 TABLET, FILM COATED ORAL at 17:04

## 2023-11-18 RX ADMIN — ENOXAPARIN SODIUM 40 MG: 100 INJECTION SUBCUTANEOUS at 08:01

## 2023-11-18 RX ADMIN — GABAPENTIN 300 MG: 300 CAPSULE ORAL at 04:28

## 2023-11-18 RX ADMIN — TAMSULOSIN HYDROCHLORIDE 0.4 MG: 0.4 CAPSULE ORAL at 08:01

## 2023-11-18 RX ADMIN — CEFEPIME 2 G: 2 INJECTION, POWDER, FOR SOLUTION INTRAVENOUS at 04:33

## 2023-11-18 RX ADMIN — LEVOTHYROXINE SODIUM 25 MCG: 0.03 TABLET ORAL at 17:04

## 2023-11-18 RX ADMIN — FAMOTIDINE 20 MG: 20 TABLET ORAL at 17:04

## 2023-11-18 RX ADMIN — FERROUS SULFATE TAB 325 MG (65 MG ELEMENTAL FE) 325 MG: 325 (65 FE) TAB at 04:29

## 2023-11-18 RX ADMIN — FAMOTIDINE 20 MG: 20 TABLET ORAL at 04:28

## 2023-11-18 RX ADMIN — GABAPENTIN 300 MG: 300 CAPSULE ORAL at 11:57

## 2023-11-18 ASSESSMENT — FIBROSIS 4 INDEX: FIB4 SCORE: 0.98

## 2023-11-18 NOTE — THERAPY
"Physical Therapy   Daily Treatment     Patient Name: Rubi Haq  Age:  78 y.o., Sex:  female  Medical Record #: [id number]  Today's Date: 11/17/2023     Precautions  Precautions: Fall Risk  Comments: wound vac    Assessment    Pt received in bed and agreeable to PT session despite fatigue today. Pt was able to continue to perform sup<>sit, transfers, and ambulate at a supervised level. Pt demonstrated slightly decreased tolerance to ambulation along with slower speed compared to prior sessions. Pt pending medical clearance for return home. Will follow to progress as able.    Plan    Treatment Plan Status: Continue Current Treatment Plan  Type of Treatment: Bed Mobility, Gait Training, Equipment, Neuro Re-Education / Balance, Stair Training, Therapeutic Activities, Therapeutic Exercise  Treatment Frequency: 4 Times per Week  Treatment Duration: Until Therapy Goals Met    DC Equipment Recommendations: None  Discharge Recommendations: Recommend home health for continued physical therapy services    Subjective    \"I feel tired today\"     Objective       11/17/23 1524   Precautions   Precautions Fall Risk   Comments wound vac   Pain 0 - 10 Group   Therapist Pain Assessment Post Activity Pain Same as Prior to Activity;Nurse Notified;3   Cognition    Level of Consciousness Alert   Comments Very pleasant and cooperative, limited by fatigue today and still motivated to participate   Balance   Sitting Balance (Static) Fair +   Sitting Balance (Dynamic) Fair +   Standing Balance (Static) Fair   Standing Balance (Dynamic) Fair   Weight Shift Sitting Good   Weight Shift Standing Good   Skilled Intervention Verbal Cuing   Comments with FWW   Bed Mobility    Supine to Sit Supervised   Sit to Supine Supervised   Scooting Supervised   Rolling Supervised   Comments HOB flat   Gait Analysis   Gait Level Of Assist Supervised   Assistive Device Front Wheel Walker   Distance (Feet) 100   # of Times Distance was Traveled 1 "   Deviation Bradykinetic;Decreased Heel Strike;Decreased Toe Off   Skilled Intervention Verbal Cuing;Compensatory Strategies   Comments Decreased ambulation distance compared to prior due to fatigue   Functional Mobility   Sit to Stand Supervised   Bed, Chair, Wheelchair Transfer Supervised   Toilet Transfers Supervised   Transfer Method Stand Step   Skilled Intervention Verbal Cuing   Short Term Goals    Short Term Goal # 1 in 6 visits patient will demo all functional transfers with sup and LRAD for safe DC   Goal Outcome # 1 Goal met   Short Term Goal # 2 in 6 visits patient will ambulate 100' w/FWW and Rossy for safe DC   Goal Outcome # 2 Goal met, new goal added   Short Term Goal # 2 B  Pt will ambulate 200ft with FWW and supervision to progress function in 6 visits.   Goal Outcome # 2 B Goal not met   Short Term Goal # 3 in 6 visits patient will demo all bed mobility indep for safe DC   Goal Outcome # 3 Goal met   Short Term Goal # 4 Pt will negotiate 2 stairs with min A via handheld assist to enter daughters home in 6 visits.   Goal Outcome # 4 Goal met   Physical Therapy Treatment Plan   Physical Therapy Treatment Plan Continue Current Treatment Plan   Anticipated Discharge Equipment and Recommendations   DC Equipment Recommendations None   Discharge Recommendations Recommend home health for continued physical therapy services   Interdisciplinary Plan of Care Collaboration   IDT Collaboration with  Nursing   Patient Position at End of Therapy Seated;Call Light within Reach;Tray Table within Reach;Phone within Reach   Collaboration Comments RN updated

## 2023-11-18 NOTE — CARE PLAN
The patient is Stable - Low risk of patient condition declining or worsening    Problem: Knowledge Deficit - Standard  Goal: Patient and family/care givers will demonstrate understanding of plan of care, disease process/condition, diagnostic tests and medications  Outcome: Progressing     Problem: Pain - Standard  Goal: Alleviation of pain or a reduction in pain to the patient’s comfort goal  Outcome: Progressing     Shift Goals  Clinical Goals: Q6 bladder scan, pain control  Patient Goals: pain control, rest, comfort  Family Goals: Updates    Progress made toward(s) clinical / shift goals:  Patient's pain managed per MAR. Q6 bladder scans in place, straight cathed per order. Patient able to rest during this shift.    Patient is not progressing towards the following goals:

## 2023-11-18 NOTE — PROGRESS NOTES
Assumed care of patient at 0645. Bedside report received . Assessment complete.  AA&Ox4. Denies CP/SOB.  Reporting 2/10 pain. Medicated per MAR.   Educated patient regarding pharmacologic and non pharmacologic modalities for pain management.  Skin per flowsheets  Tolerating Regular diet. Denies N/V.  + void. + BM. Last BM 11/18  Pt ambulates SBA w FWW.  All needs met at this time. Call light within reach. Pt calls appropriately. Bed low and locked, non skid socks in place. Hourly rounding in place.

## 2023-11-18 NOTE — DISCHARGE PLANNING
Case Management Discharge Planning    Admission Date: 11/5/2023  GMLOS: 9.9  ALOS: 13    6-Clicks ADL Score: 20  6-Clicks Mobility Score: 18      Anticipated Discharge Dispo: Discharge Disposition: D/T to short term gen hosp for IP care w/ planned IP readmit (82)  Discharge Address: daughter's home [address] [place] [id number]  Discharge Contact Phone Number: correct on face sheet    Enedina with [facility] states there has been approval for the wound vac.   Enedina states there is a wound vac in [facility] closet.   Karla number is [phone].  Enedina states there will be a form that pt will need to sign prior to DC with the vac.     Next Steps: Follow up with Fernando CUEVAS.

## 2023-11-18 NOTE — CARE PLAN
Problem: Knowledge Deficit - Standard  Goal: Patient and family/care givers will demonstrate understanding of plan of care, disease process/condition, diagnostic tests and medications  Outcome: Progressing     Problem: Respiratory  Goal: Patient will achieve/maintain optimum respiratory ventilation and gas exchange  Outcome: Progressing     Problem: Pain - Standard  Goal: Alleviation of pain or a reduction in pain to the patient’s comfort goal  Outcome: Progressing   The patient is Stable - Low risk of patient condition declining or worsening    Shift Goals  Clinical Goals: Straight Cath, Decrease Edema  Patient Goals: Pain Control, Comfort  Family Goals: N/A    Progress made toward(s) clinical / shift goals:  Straight Cathed per Flowsheets, Patient on Room Air, Pain medicated per MAR, Patient Reports Decreased Edema     Patient is not progressing towards the following goals:

## 2023-11-18 NOTE — PROGRESS NOTES
Bedside report received.  Assessment complete.     A&O x 4. Patient calls appropriately.  Patient ambulates with standby assist.   Patient has 2/10 pain. No pharmacological interventions provided at this time.  Denies N&V. Tolerating regular diet, poor PO intake per pt.  MLI to abdomen with wound vac, patent, CDI.  + void, + flatus, + BM, last BM 11/17. Q6 bladder scan order in place.   Patient denies SOB.     Review plan with of care with patient. Call light and personal belongings within reach. Hourly rounding in place. All needs met at this time.

## 2023-11-18 NOTE — PROGRESS NOTES
"  DATE: 11/18/2023    Hospital Day 13  small bowel obstruction & volvulus .    Postoperative Day # 13 Exploratory laparotomy, small bowel resection, & ABThera Placement.    Postoperative Day # 13 Exploratory laparotomy, small bowel resection, primary anastomosis, & negative pressure dressing application.    INTERVAL EVENTS:  WBC normalized at 10.6 this AM on Cefepime.  Prelim blood cultures remain negative.  Continues to remain afebrile & clinically non toxic.    - Transition to Augmentin & repeat CBC in the AM  - Disposition: PT recommends discharge home once medically clear. Plan to discharge home with home health wound care and wound vac once tomorrow if WBC remains normalized.    PHYSICAL EXAMINATION:  Vital Signs: /70   Pulse 99   Temp 37.2 °C (99 °F) (Temporal)   Resp 18   Ht 1.626 m (5' 4.02\")   Wt 70.3 kg (154 lb 15.7 oz)   SpO2 96%     Alert & oriented, afebrile, no acute distress.  Unlabored respirations tolerating room air.  Abdomen soft, non distended, & non tender.  Midline abdominal incision with vac intact & functioning.  Tolerating regular diet, no nausea or vomiting.   + flatus Last BM 11/16    LABORATORY VALUES:  Recent Labs     11/16/23  0516 11/17/23  0605 11/18/23  0305   WBC 15.2* 13.0* 10.6   RBC 2.82* 2.66* 2.40*   HEMOGLOBIN 8.8* 8.2* 7.6*   HEMATOCRIT 28.4* 25.8* 24.4*   .7* 97.0 101.7*   MCH 31.2 30.8 31.7   MCHC 31.0* 31.8* 31.1*   RDW 51.2* 49.8 52.5*   PLATELETCT 455* 491* 473*   MPV 9.3 9.0 8.9*     Recent Labs     11/16/23  0516 11/17/23  0605   SODIUM 132* 135   POTASSIUM 3.3* 4.1   CHLORIDE 102 105   CO2 19* 19*   GLUCOSE 98 99   BUN 9 8   CREATININE 0.75 0.74   CALCIUM 8.3* 8.6     IMAGING:  US-CHEST   Final Result      Left breast subcutaneous edema, without drainable fluid collection.      CT-CHEST,ABDOMEN,PELVIS WITH   Final Result      1. Small bowel ileus, with patent ileocolic anastomosis.   2. Interpolar region right renal cortical cyst.   3. Moderate " right hydronephrosis.   4. Extensive anasarca with moderate ascites in the paracolic gutters and pelvis.   5. Moderate bilateral pleural effusions with passive atelectasis in the lung bases.   6. No evidence for intra-abdominal abscess.      US-EXTREMITY VENOUS UPPER UNILAT LEFT   Final Result      US-TRAUMA VEIN SCREEN LOWER BILAT EXTREMITY   Final Result      DX-CHEST-PORTABLE (1 VIEW)   Final Result      Increased left pleural effusion with superimposed left basilar airspace disease versus atelectasis.      DX-CHEST-PORTABLE (1 VIEW)   Final Result         1.  Left basilar atelectasis, no focal infiltrate      DX-CHEST-PORTABLE (1 VIEW)   Final Result         1.  Left retrocardiac density suggesting subtle infiltrate.      DX-CHEST-PORTABLE (1 VIEW)   Final Result         1.  Left basilar atelectasis or early infiltrate.      EC-ECHOCARDIOGRAM COMPLETE W/ CONT   Final Result      DX-CHEST-PORTABLE (1 VIEW)   Final Result         1.  Left basilar atelectasis or early infiltrate.      DX-ABDOMEN FOR TUBE PLACEMENT   Final Result      1.  Enteric tube overlies the proximal stomach.      DX-CHEST-PORTABLE (1 VIEW)   Final Result      1.  There is no acute cardiopulmonary process.   2.  Satisfactory appearance of the tubes and lines.      US-EXTREMITY VENOUS LOWER BILAT   Final Result      OUTSIDE IMAGES-CT ABDOMEN /PELVIS   Final Result      OUTSIDE IMAGES-DX CHEST   Final Result      DX-CHEST-PORTABLE (1 VIEW)   Final Result      1.  There is no acute cardiopulmonary process.        ASSESSMENT AND PLAN:  * Small bowel obstruction (HCC)- (present on admission)  Assessment & Plan  11/5 Emergent Exploratory laparotomy with bowel resection.   Left in discontinuity / ABThera  11/7 Additional small bowel resected at ileum, and proximal anastomosis, fascial closure.    11/10 Zosyn day 4 of 4 from source control and closure.    11/13 CT abdomen pelvis completed for leukocytosis without evidence of intraabdominal abscess      Leukocytosis- (present on admission)  Assessment & Plan  11/12 WBC trend up to 14.9  - Chest xray with increased left effusion and atelectasis   - Bilateral lower extremity duplex, urinalysis, C diff stool sample negative  11/13 WBC continue to trend up  - Swelling to left upper extremity, ultrasound with superficial thrombus  - CT chest / abdomen / pelvis with bilateral pleural effusions and atelectasis  11/14 WBC slight trend down  - Empiric vanco and cefepime initiated & blood cultures obtained  - MRSA swab negative, vanco discontinued  - Repeat UA negative  11/15 COVID & influenza negative  11/16 Left breast ultrasound unremarkable.  11/18 WBC normalized, transition to Augmentin.    Urinary retention  Assessment & Plan  11/13 Arshad placed overnight for retention  - Add Flomax     Anemia  Assessment & Plan  On oral iron supplementation outpatient.  11/8 Iron studies low, replacement ordered per pharmacy.  11/11 Hemglobin 6.9, 1 unit packed red cells transfused. Outpatient ferrous sulfate resumed.  Trend hemograms and transfuse packed red cells for hemoglobin less than 7.0    Acute deep vein thrombosis (DVT) (HCC)- (present on admission)  Assessment & Plan  11/5 Acute/subacute occlusive thrombus in the left posterior tibial vein.    11/8 Pharmacological DVT prophylaxis, Lovenox 40 mg Q day initiated.   11/12 Trauma bilateral lower extremity duplex negative for DVT.    No contraindication to deep vein thrombosis (DVT) prophylaxis- (present on admission)  Assessment & Plan  11/8 Pharmacological DVT prophylaxis, Lovenox initiated     HLD (hyperlipidemia)- (present on admission)  Assessment & Plan  Chronic condition treated with atorvastatin.  Holding maintenance medication during acute septic illness.  11/10 atorvastatin resumed.    Hypothyroid- (present on admission)  Assessment & Plan  Chronic condition treated with levothyroxine.  Holding maintenance medication during acute septic illness.  11/10 Resumed  levothyroxine.         ____________________________________     YESICA Stevenson

## 2023-11-18 NOTE — PROGRESS NOTES
4 Eyes Skin Assessment Completed by JESUS Edmonds and JESUS Steinberg.     Head WDL  Ears WDL  Nose WDL  Mouth WDL  Neck WDL  Breast/Chest slight swelling to left breast  Shoulder Blades WDL  Spine WDL  (R) Arm/Elbow/Hand WDL  (L) Arm/Elbow/Hand WDL  Abdomen MLI with wound vac, patent, CDI  Groin Redness, Excoriation to constanza-vaginal area  Scrotum/Coccyx/Buttocks WDL  (R) Leg Edema, pitting  (L) Leg Edema, pitting  (R) Heel/Foot/Toe Edema/Swelling;compression stocking in place  (L) Heel/Foot/Toe Edema/Swelling; compression stocking in place              Devices In Places Blood Pressure Cuff, Pulse Ox, Wound vac, and Compression Stockings        Interventions In Place Sacral Mepilex, TAP System, Pillows, Dri-Tyler Pads, Heels Loaded W/Pillows, and Low Air Loss Mattress     Possible Skin Injury No     Pictures Uploaded Into Epic N/A  Wound Consult Placed Yes, previously done  RN Wound Prevention Protocol Ordered Yes, previously done

## 2023-11-19 LAB
ANION GAP SERPL CALC-SCNC: 9 MMOL/L (ref 7–16)
BACTERIA BLD CULT: NORMAL
BACTERIA BLD CULT: NORMAL
BASOPHILS # BLD AUTO: 0.9 % (ref 0–1.8)
BASOPHILS # BLD: 0.09 K/UL (ref 0–0.12)
BUN SERPL-MCNC: 8 MG/DL (ref 8–22)
CALCIUM SERPL-MCNC: 8.3 MG/DL (ref 8.5–10.5)
CHLORIDE SERPL-SCNC: 103 MMOL/L (ref 96–112)
CO2 SERPL-SCNC: 22 MMOL/L (ref 20–33)
CREAT SERPL-MCNC: 0.63 MG/DL (ref 0.5–1.4)
EOSINOPHIL # BLD AUTO: 0.39 K/UL (ref 0–0.51)
EOSINOPHIL NFR BLD: 3.8 % (ref 0–6.9)
ERYTHROCYTE [DISTWIDTH] IN BLOOD BY AUTOMATED COUNT: 50.5 FL (ref 35.9–50)
GFR SERPLBLD CREATININE-BSD FMLA CKD-EPI: 89 ML/MIN/1.73 M 2
GLUCOSE SERPL-MCNC: 107 MG/DL (ref 65–99)
HCT VFR BLD AUTO: 25.1 % (ref 37–47)
HGB BLD-MCNC: 7.8 G/DL (ref 12–16)
IMM GRANULOCYTES # BLD AUTO: 0.05 K/UL (ref 0–0.11)
IMM GRANULOCYTES NFR BLD AUTO: 0.5 % (ref 0–0.9)
LYMPHOCYTES # BLD AUTO: 1.43 K/UL (ref 1–4.8)
LYMPHOCYTES NFR BLD: 14 % (ref 22–41)
MCH RBC QN AUTO: 31 PG (ref 27–33)
MCHC RBC AUTO-ENTMCNC: 31.1 G/DL (ref 32.2–35.5)
MCV RBC AUTO: 99.6 FL (ref 81.4–97.8)
MONOCYTES # BLD AUTO: 0.94 K/UL (ref 0–0.85)
MONOCYTES NFR BLD AUTO: 9.2 % (ref 0–13.4)
NEUTROPHILS # BLD AUTO: 7.31 K/UL (ref 1.82–7.42)
NEUTROPHILS NFR BLD: 71.6 % (ref 44–72)
NRBC # BLD AUTO: 0 K/UL
NRBC BLD-RTO: 0 /100 WBC (ref 0–0.2)
PLATELET # BLD AUTO: 539 K/UL (ref 164–446)
PMV BLD AUTO: 8.5 FL (ref 9–12.9)
POTASSIUM SERPL-SCNC: 3.2 MMOL/L (ref 3.6–5.5)
RBC # BLD AUTO: 2.52 M/UL (ref 4.2–5.4)
SIGNIFICANT IND 70042: NORMAL
SIGNIFICANT IND 70042: NORMAL
SITE SITE: NORMAL
SITE SITE: NORMAL
SODIUM SERPL-SCNC: 134 MMOL/L (ref 135–145)
SOURCE SOURCE: NORMAL
SOURCE SOURCE: NORMAL
WBC # BLD AUTO: 10.2 K/UL (ref 4.8–10.8)

## 2023-11-19 PROCEDURE — 99024 POSTOP FOLLOW-UP VISIT: CPT

## 2023-11-19 PROCEDURE — 700102 HCHG RX REV CODE 250 W/ 637 OVERRIDE(OP): Performed by: NURSE PRACTITIONER

## 2023-11-19 PROCEDURE — 700101 HCHG RX REV CODE 250: Performed by: SURGERY

## 2023-11-19 PROCEDURE — 770001 HCHG ROOM/CARE - MED/SURG/GYN PRIV*

## 2023-11-19 PROCEDURE — 700111 HCHG RX REV CODE 636 W/ 250 OVERRIDE (IP): Mod: JZ | Performed by: SURGERY

## 2023-11-19 PROCEDURE — 80048 BASIC METABOLIC PNL TOTAL CA: CPT

## 2023-11-19 PROCEDURE — 36415 COLL VENOUS BLD VENIPUNCTURE: CPT

## 2023-11-19 PROCEDURE — 700102 HCHG RX REV CODE 250 W/ 637 OVERRIDE(OP)

## 2023-11-19 PROCEDURE — A9270 NON-COVERED ITEM OR SERVICE: HCPCS | Performed by: NURSE PRACTITIONER

## 2023-11-19 PROCEDURE — 85025 COMPLETE CBC W/AUTO DIFF WBC: CPT

## 2023-11-19 PROCEDURE — 97605 NEG PRS WND THER DME<=50SQCM: CPT

## 2023-11-19 PROCEDURE — 51798 US URINE CAPACITY MEASURE: CPT

## 2023-11-19 PROCEDURE — 97602 WOUND(S) CARE NON-SELECTIVE: CPT

## 2023-11-19 PROCEDURE — A9270 NON-COVERED ITEM OR SERVICE: HCPCS

## 2023-11-19 RX ORDER — POTASSIUM CHLORIDE 20 MEQ/1
40 TABLET, EXTENDED RELEASE ORAL ONCE
Status: COMPLETED | OUTPATIENT
Start: 2023-11-19 | End: 2023-11-19

## 2023-11-19 RX ADMIN — ATORVASTATIN CALCIUM 20 MG: 20 TABLET, FILM COATED ORAL at 16:05

## 2023-11-19 RX ADMIN — AMOXICILLIN AND CLAVULANATE POTASSIUM 1 TABLET: 875; 125 TABLET, FILM COATED ORAL at 16:05

## 2023-11-19 RX ADMIN — AMOXICILLIN AND CLAVULANATE POTASSIUM 1 TABLET: 875; 125 TABLET, FILM COATED ORAL at 04:52

## 2023-11-19 RX ADMIN — Medication 2000 UNITS: at 04:53

## 2023-11-19 RX ADMIN — TAMSULOSIN HYDROCHLORIDE 0.4 MG: 0.4 CAPSULE ORAL at 09:19

## 2023-11-19 RX ADMIN — ENOXAPARIN SODIUM 40 MG: 100 INJECTION SUBCUTANEOUS at 09:19

## 2023-11-19 RX ADMIN — POTASSIUM CHLORIDE 40 MEQ: 1500 TABLET, EXTENDED RELEASE ORAL at 12:21

## 2023-11-19 RX ADMIN — GABAPENTIN 300 MG: 300 CAPSULE ORAL at 04:52

## 2023-11-19 RX ADMIN — GABAPENTIN 300 MG: 300 CAPSULE ORAL at 16:05

## 2023-11-19 RX ADMIN — LEVOTHYROXINE SODIUM 25 MCG: 0.03 TABLET ORAL at 16:05

## 2023-11-19 RX ADMIN — FERROUS SULFATE TAB 325 MG (65 MG ELEMENTAL FE) 325 MG: 325 (65 FE) TAB at 04:53

## 2023-11-19 RX ADMIN — LIDOCAINE HYDROCHLORIDE 1 APPLICATION: 40 SOLUTION TOPICAL at 11:02

## 2023-11-19 RX ADMIN — GABAPENTIN 300 MG: 300 CAPSULE ORAL at 10:57

## 2023-11-19 RX ADMIN — OXYCODONE 2.5 MG: 5 TABLET ORAL at 23:23

## 2023-11-19 RX ADMIN — GABAPENTIN 300 MG: 300 CAPSULE ORAL at 23:23

## 2023-11-19 RX ADMIN — FAMOTIDINE 20 MG: 20 TABLET ORAL at 04:53

## 2023-11-19 RX ADMIN — OXYCODONE 2.5 MG: 5 TABLET ORAL at 00:48

## 2023-11-19 RX ADMIN — FAMOTIDINE 20 MG: 20 TABLET ORAL at 16:05

## 2023-11-19 RX ADMIN — OXYCODONE 2.5 MG: 5 TABLET ORAL at 10:57

## 2023-11-19 NOTE — PROGRESS NOTES
"  DATE: 11/19/2023    Hospital Day 13  small bowel obstruction & volvulus .    Postoperative Day # 13 Exploratory laparotomy, small bowel resection, & ABThera Placement.    Postoperative Day # 13 Exploratory laparotomy, small bowel resection, primary anastomosis, & negative pressure dressing application.    INTERVAL EVENTS:  WBC remains normalized after transition to Augmentin yesterday.  Final blood cultures negative.  [facility] referral remains pending, case management unable to reach out today for confirmation due to weekend.  Patient is medically clear for discharge, plan to discharge home likely tomorrow once home health confirmed for wound care at home.    PHYSICAL EXAMINATION:  Vital Signs: /64   Pulse 84   Temp 36.8 °C (98.2 °F) (Temporal)   Resp 18   Ht 1.626 m (5' 4.02\")   Wt 53.3 kg (117 lb 8.1 oz)   SpO2 97%     Alert & oriented, afebrile, no acute distress.  Unlabored respirations tolerating room air.  Abdomen soft, non distended, & non tender.  Midline abdominal incision with vac intact & functioning.  Tolerating regular diet, no nausea or vomiting.   + bowel movements.    LABORATORY VALUES:  Recent Labs     11/17/23  0605 11/18/23  0305 11/19/23  0845   WBC 13.0* 10.6 10.2   RBC 2.66* 2.40* 2.52*   HEMOGLOBIN 8.2* 7.6* 7.8*   HEMATOCRIT 25.8* 24.4* 25.1*   MCV 97.0 101.7* 99.6*   MCH 30.8 31.7 31.0   MCHC 31.8* 31.1* 31.1*   RDW 49.8 52.5* 50.5*   PLATELETCT 491* 473* 539*   MPV 9.0 8.9* 8.5*     Recent Labs     11/17/23  0605 11/18/23  0917 11/19/23  0845   SODIUM 135 134* 134*   POTASSIUM 4.1 3.4* 3.2*   CHLORIDE 105 103 103   CO2 19* 21 22   GLUCOSE 99 133* 107*   BUN 8 8 8   CREATININE 0.74 0.75 0.63   CALCIUM 8.6 8.5 8.3*     IMAGING:  US-CHEST   Final Result      Left breast subcutaneous edema, without drainable fluid collection.      CT-CHEST,ABDOMEN,PELVIS WITH   Final Result      1. Small bowel ileus, with patent ileocolic anastomosis.   2. Interpolar region right [facility] " [address] [facility]                       PREOPERATIVE HISTORY AND PHYSICAL    PATIENT NAME: Sindy Orr                     :        1958  MED REC NO:   [id number]                            ROOM:       0253  ACCOUNT NO:   [de-identified]                           ADMIT DATE: 2021  PROVIDER:     Naga Wade MD    HISTORY OF PRESENT ILLNESS:  This is a 58-year-old gentleman who  presented to the office on 2021 with a one-month history of a sore  spot on the right ear. Subsequently, it got bigger. It started in the  inferior aspect of the conchal bowl. The amoxicillin did not help. Levaquin did not help as well. He was admitted to the hospital the day  that I saw him, _____ admitted over three days' time. He had an  incision and drainage done as well, but there was no pertinent fluid  found. Cultures were negative. He presents now for right partial  auriculectomy. PAST MEDICAL HISTORY:  History of hypertension. MEDICATIONS:  Include metoprolol and tramadol. ALLERGIES:  No known drug allergies. SOCIAL HISTORY:  He does not smoke tobacco.    PHYSICAL EXAMINATION:  HEENT:  Tympanic membranes are normal.  External auditory canal patent. There was an open skin lesion, but no purulent fluid on the inferior  aspect of the right conchal bowl extending down into the ear lobule. There was a nodule, tender area inferiorly. The posterior skin was  absolutely intact. There was no redness. There was a very sharply  demarcated area along the posterior aspect of the nodular mass in the  normal portion of the auricle. NECK:  No adenopathy. CHEST:  Clear. HEART:  No cardiac murmur. ABDOMEN:  No abdominal mass. IMPRESSION:  Right auricular mass. No response to antibiotics. RECOMMENDATIONS:  Right partial auriculectomy.   I discussed with the  patient the possibility of resecting the cortical cyst.   3. Moderate right hydronephrosis.   4. Extensive anasarca with moderate ascites in the paracolic gutters and pelvis.   5. Moderate bilateral pleural effusions with passive atelectasis in the lung bases.   6. No evidence for intra-abdominal abscess.      US-EXTREMITY VENOUS UPPER UNILAT LEFT   Final Result      US-TRAUMA VEIN SCREEN LOWER BILAT EXTREMITY   Final Result      DX-CHEST-PORTABLE (1 VIEW)   Final Result      Increased left pleural effusion with superimposed left basilar airspace disease versus atelectasis.      DX-CHEST-PORTABLE (1 VIEW)   Final Result         1.  Left basilar atelectasis, no focal infiltrate      DX-CHEST-PORTABLE (1 VIEW)   Final Result         1.  Left retrocardiac density suggesting subtle infiltrate.      DX-CHEST-PORTABLE (1 VIEW)   Final Result         1.  Left basilar atelectasis or early infiltrate.      EC-ECHOCARDIOGRAM COMPLETE W/ CONT   Final Result      DX-CHEST-PORTABLE (1 VIEW)   Final Result         1.  Left basilar atelectasis or early infiltrate.      DX-ABDOMEN FOR TUBE PLACEMENT   Final Result      1.  Enteric tube overlies the proximal stomach.      DX-CHEST-PORTABLE (1 VIEW)   Final Result      1.  There is no acute cardiopulmonary process.   2.  Satisfactory appearance of the tubes and lines.      US-EXTREMITY VENOUS LOWER BILAT   Final Result      OUTSIDE IMAGES-CT ABDOMEN /PELVIS   Final Result      OUTSIDE IMAGES-DX CHEST   Final Result      DX-CHEST-PORTABLE (1 VIEW)   Final Result      1.  There is no acute cardiopulmonary process.        ASSESSMENT AND PLAN:  * Small bowel obstruction (HCC)- (present on admission)  Assessment & Plan  11/5 Emergent Exploratory laparotomy with bowel resection.   Left in discontinuity / ABThera  11/7 Additional small bowel resected at ileum, and proximal anastomosis, fascial closure.    11/10 Zosyn day 4 of 4 from source control and closure.    11/13 CT abdomen pelvis completed for leukocytosis without evidence  mass or reconstructive efforts  if the mass is a full-thickness lesion, would be quite limited. He was  quite concerned about the auricular defect, but the persistence of the  mass with tenderness; in fact, there was no response to the antibiotics  nor cultures positive nor any result from incision and drainage. He  understands and wished to proceed.         Otis Hill MD    D: 08/15/2021 22:02:32       T: 08/16/2021 2:58:09     [email or web address]  Job#: [id number]     Doc#: [id number]    CC: of intraabdominal abscess     Leukocytosis- (present on admission)  Assessment & Plan  11/12 WBC trend up to 14.9  - Chest xray with increased left effusion and atelectasis   - Bilateral lower extremity duplex, urinalysis, C diff stool sample negative  11/13 WBC continue to trend up  - Swelling to left upper extremity, ultrasound with superficial thrombus  - CT chest / abdomen / pelvis with bilateral pleural effusions and atelectasis  11/14 WBC slight trend down  - Empiric vanco and cefepime initiated & blood cultures obtained  - MRSA swab negative, vanco discontinued  - Repeat UA negative  11/15 COVID & influenza negative  11/16 Left breast ultrasound unremarkable.  11/18 WBC normalized, transition to Augmentin.  11/19 Final blood cultures negative.    Urinary retention  Assessment & Plan  11/13 Arshad placed overnight for retention, Flomax initiated.     Anemia  Assessment & Plan  On oral iron supplementation outpatient.  11/8 Iron studies low, replacement ordered per pharmacy.  11/11 Hemglobin 6.9, 1 unit packed red cells transfused. Outpatient ferrous sulfate resumed.  Trend hemograms and transfuse packed red cells for hemoglobin less than 7.0    Acute deep vein thrombosis (DVT) (HCC)- (present on admission)  Assessment & Plan  11/5 Acute/subacute occlusive thrombus in the left posterior tibial vein.    11/8 Pharmacological DVT prophylaxis, Lovenox 40 mg Q day initiated.   11/12 Trauma bilateral lower extremity duplex negative for DVT.    No contraindication to deep vein thrombosis (DVT) prophylaxis- (present on admission)  Assessment & Plan  11/8 Pharmacological DVT prophylaxis, Lovenox initiated     HLD (hyperlipidemia)- (present on admission)  Assessment & Plan  Chronic condition treated with atorvastatin.  Holding maintenance medication during acute septic illness.  11/10 atorvastatin resumed.    Hypothyroid- (present on admission)  Assessment & Plan  Chronic condition treated with levothyroxine.  Holding  maintenance medication during acute septic illness.  11/10 Resumed levothyroxine.         ____________________________________     LIBORIO Stevenson.

## 2023-11-19 NOTE — CARE PLAN
Problem: Knowledge Deficit - Standard  Goal: Patient and family/care givers will demonstrate understanding of plan of care, disease process/condition, diagnostic tests and medications  Outcome: Progressing     Problem: Fluid Volume  Goal: Fluid volume balance will be maintained  Outcome: Progressing     Problem: Respiratory  Goal: Patient will achieve/maintain optimum respiratory ventilation and gas exchange  Outcome: Progressing   The patient is Stable - Low risk of patient condition declining or worsening    Shift Goals  Clinical Goals: Discharge/ Voiding  Patient Goals: Discharge  Family Goals: N/A    Progress made toward(s) clinical / shift goals:  Patient Voided this shift, Bladder Scans per Flowsheets,, Educated patient on discharge barriers, Patient verbalized understanding     Patient is not progressing towards the following goals:

## 2023-11-19 NOTE — PROGRESS NOTES
Pt void: 300 mL. Pt's post void bladder scan = 534 mL. Straight cathed per order o/p 650 mL.    Pt's urethra is red and swollen, making it difficult to access urethral opening with catheter. This is possibly being made worse by multiple attempts at straight cathing pt due to difficulty accessing urethral opening due to swelling, pain, and tensing of the patient. Multiple Rns who have all previously straight cathed pt provided assistance, attempts, and input. All confirming that patient has increased redness and swelling in the area.

## 2023-11-19 NOTE — PROGRESS NOTES
Assumed care of patient at 0645. Bedside report received. Assessment complete.  AA&Ox4. Denies CP/SOB.  Reporting 2/10 pain. Declined intervention at this time.  Educated patient regarding pharmacologic and non pharmacologic modalities for pain management.  Skin per flowsheets  Tolerating Regular diet. Denies N/V.  + void, q6 Bladder Scans in place, Patient Straight Cathed if Result >400 mL + BM. Last BM 11/19  Pt ambulates SBA.w FWW  All needs met at this time. Call light within reach. Pt calls appropriately. Bed low and locked, non skid socks in place. Hourly rounding in place.

## 2023-11-19 NOTE — CARE PLAN
The patient is Stable - Low risk of patient condition declining or worsening    Shift Goals  Clinical Goals: Pt will have less than 400 mL in her PVR  Patient Goals: void, rest  Family Goals: jessie    Progress made toward(s) clinical / shift goals:  Pt is scanned every 6 hours and after voids, straight cathed if greater than 400 mL    Patient is not progressing towards the following goals:

## 2023-11-19 NOTE — WOUND TEAM
[facility]  Inpatient Services  Wound and Skin Care Follow-up    Admission Date: 11/5/2023     Last order of IP CONSULT TO WOUND CARE was found on 11/11/2023 from Hospital Encounter on 11/5/2023     HPI, PMH, SH: Reviewed    Past Surgical History:   Procedure Laterality Date    RI EXPLORATORY OF ABDOMEN N/A 11/7/2023    Procedure: LAPAROTOMY, EXPLORATORY 2ND LOOK;  Surgeon: Tobi Locke M.D.;  Location: SUR[facility]ERY [facility];  Service: General    WOUND CLOSURE NEURO N/A 11/7/2023    Procedure: CLOSURE, WOUND;  Surgeon: Tobi Locke M.D.;  Location: SURGERY [facility];  Service: General    BOWEL RESECTION  11/7/2023    Procedure: EXCISION, INTESTINE;  Surgeon: Tobi Locke M.D.;  Location: [facility];  Service: General    RI EXPLORATORY OF ABDOMEN  11/5/2023    Procedure: LAPAROTOMY, EXPLORATORY,  SMALL BOWEL RESECTION vac greater than 50cm, abthera placement;  Surgeon: Doroteo York D.O.;  Location: [facility];  Service: Gen Robotic    FUSION, SPINE, LUMBAR, PLIF  6/10/2019    Procedure: FUSION, SPINE, LUMBAR, TLIF L4-5;  Surgeon: Amado Marquez M.D.;  Location: [facility];  Service: Neurosurgery    LUMBAR LAMINECTOMY DISKECTOMY  6/10/2019    Procedure: LAMINECTOMY, SPINE, LUMBAR, WITH DISCECTOMY- REDO;  Surgeon: Amado Marquez M.D.;  Location: [facility];  Service: Neurosurgery    OTHER  2018    glaucoma surgery right and left eye    OTHER  2015    yag laser surgery right eye    OTHER  2014    mohs basal cell nasal    OTHER  2014    basal cell nasal    GYN SURGERY  2011    hysterectomy    OTHER ORTHOPEDIC SURGERY  2010    right thumb    OTHER ORTHOPEDIC SURGERY  2010    left thumb    OTHER  2010    right iol    OTHER ORTHOPEDIC SURGERY  2007    spine    OTHER ORTHOPEDIC SURGERY  2007    dural leak    OTHER ORTHOPEDIC SURGERY  2002    carpel tunnel    GYN SURGERY  1983    c sec     Social History     Tobacco Use    Smoking status: Never     Smokeless tobacco: Never   Substance Use Topics    Alcohol use: Yes     Comment: 1 daily     No chief complaint on file.    Diagnosis: Small bowel obstruction (HCC) [K56.609]    Unit where seen by Wound Team: [id number]/02     WOUND FOLLOW UP RELATED TO:  Abdomen       WOUND TEAM PLAN OF CARE - Frequency of Follow-up:   Nursing to follow dressing orders written for wound care. 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 Pt had abdominal surgery 1.5 years prior for lysis of adhesions. Pt presented to [facility] 1 day prior to admission at [facility] and CT revealed small bowel obstruction. Pt was transferred to [facility] for higher level of care and was noted to have elevated WBC, as well as elevated lactic acid and creatinine in combination with concerning abdominal exam. Pt was therefore taken to OR on 11/5/23 for Ex lap and abthera placement. Pt returned to OR on 11/7/23 for fascia closure and black foam wound vac application. Wound team was subsequently consulted to manage NPWT.       WOUND ASSESSMENT/LDA  Wound 11/05/23 Full Thickness Wound Open Incision Abdomen (Active)   Date First Assessed/Time First Assessed: 11/05/23 1801   Primary Wound Type: Full Thickness Wound  Surgical Wound Type: Open Incision  Location: Abdomen      Assessments 11/19/2023 11:50 AM   Wound Image     Site Assessment Red;Early/partial granulation   Periwound Assessment Clean;Dry;Intact   Margins Attached edges;Defined edges   Closure Secondary intention   Drainage Amount Small   Drainage Description  Serosanguineous   Treatments Cleansed;Nonselective debridement;Site care   Wound Cleansing Approved Wound Cleanser   Periwound Protectant No-sting Skin Prep;Drape   Dressing Status Clean;Dry;Intact   Dressing Changed Changed   Dressing Cleansing/Solutions Not Applicable   Dressing Options Wound Vac   Dressing Change/Treatment Frequency Monday, Wednesday, Friday, and As Needed   NEXT Dressing Change/Treatment Date 11/22/23   NEXT Weekly Photo (Inpatient Only) 11/22/23   Wound Team Following 3x Weekly   Non-staged Wound Description Full thickness   Shape Linear   Wound Odor None   WOUND NURSE ONLY - Time Spent with Patient (mins) 60       Negative Pressure Wound Therapy 11/05/23 Surgical Abdomen (Active)   Placement Date/Time: 11/05/23 1802   Inserted by: OR  Wound Type: Surgical  Location: Abdomen      Assessments 11/19/2023 11:50 AM   NPWT Pump Mode / Pressure Setting Ulta;Continuous;125 mmHg   Dressing Type Medium;Black Foam (Regular)   Number of Foam Pieces Used 2   Canister Changed No   NEXT Dressing Change/Treatment Date 11/22/23        Vascular:    BARB:   No results found.    Lab Values:    Lab Results   Component Value Date/Time    WBC 10.2 11/19/2023 08:45 AM    RBC 2.52 (L) 11/19/2023 08:45 AM    HEMOGLOBIN 7.8 (L) 11/19/2023 08:45 AM    HEMATOCRIT 25.1 (L) 11/19/2023 08:45 AM         Culture Results show:  No results found for this or any previous visit (from the past 720 hour(s)).    Pain Level/Medicated:  4% Lidocaine allowed to dwell on wound bed 45min prior and PO pain medications administered by bedside RN 45min prior       INTERVENTIONS BY WOUND TEAM:  Chart and images reviewed. Discussed with bedside RN. All areas of concern (based on picture review, LDA review and discussion with bedside RN) have been thoroughly assessed. Documentation of areas based on significant findings. This RN in to assess patient. Performed standard wound care which includes appropriate positioning, dressing removal and  non-selective debridement. Pictures and measurements obtained weekly if/when required.    Wound:  Abdomen  Preparation for Dressing removal: Dressing soaked with adhesive remover spray and Dressing soaked with Lidocaine  Cleansed/Non-selectively Debrided with:  Wound cleanser and Gauze  Shira wound: Cleansed with Wound cleanser and Gauze, Prepped with No Sting and Drape  Primary Dressing:  One piece of spiraled half thickness black regular foam was applied into wound bed and secured with drape.  Secondary (Outer) Dressing: A hole was cut in drape and a 2nd piece of half thickness circular black foam was applied as a button for trac pad. Trac pad applied and suction resumed. No leaks noted.    Advanced Wound Care Discharge Planning  Number of Clinicians necessary to complete wound care: 1  Is patient requiring IV pain medications for dressing changes:  No   Length of time for dressing change 30 min. (This does not include chart review, pre-medication time, set up, clean up or time spent charting.)    Interdisciplinary consultation: Patient, Bedside RN (Esteban), Cielo SCOTT (Wound RN).  Pressure injury and staging reviewed with N/A.    EVALUATION / RATIONALE FOR TREATMENT:     Date:  11/19/23  Wound Status:  Wound progressing as expected    Wound continues to progress. Continued with NPWT. Pt will likely DC home early this week to daughters home with home health.    Date:  11/17/23  Wound Status:  Wound progressing as expected    Wound continues to progress, minimal change from previous assessment. Continued with regular NPWT.     Date:  11/15/23  Wound Status:  Wound progressing as expected    Wound continues to progress as expected still has significant depth at distal end. Continued with NPWT.    Date:  11/13/23  Wound Status:  Wound progressing as expected    Wound still with significant depth at distal end, however wound is clean. Continued with NPWT.    Date:  11/11/23  Wound Status:  Wound progressing as  expected  Less adipose tissue exposed today, tissue still appears easily friable. May benefit from transition to Veraflo NPWT as wound base becomes more robust if patient remains in-house (a veraflo kit and cassette are at bedside).  Plan is working on transferring back to [facility].    Date:  11/09/23  Wound Status:  Initial evaluation  Pt with fresh abdominal incision. Minimal granulation tissue noted that this point. Regular NPWT applied to assist in granular tissue development and wound closure.         Goals: Steady decrease in wound area and depth weekly.    NURSING PLAN OF CARE ORDERS:  No new orders this visit    NUTRITION RECOMMENDATIONS   Wound Team Recommendations:  N/A     DIET ORDERS (From admission to next 24h)       Start     Ordered    11/10/23 1641  Diet Order Diet: Regular  ALL MEALS        Question:  Diet:  Answer:  Regular    11/10/23 1640    11/10/23 1012  Supplements  ALL MEALS        Question:  Which Supplement  Answer:  OTHER (see comments)  Comment:  Ensure Clear    11/10/23 1012                  Anticipated discharge plans:  Home Health Care        Vac Discharge Needs:  Vac Discharge plan is purely a recommendation from wound team and not a requirement for discharge unless otherwise stated by physician.  Regular Vac in use and continued at discharge

## 2023-11-20 PROCEDURE — A9270 NON-COVERED ITEM OR SERVICE: HCPCS | Performed by: NURSE PRACTITIONER

## 2023-11-20 PROCEDURE — 770001 HCHG ROOM/CARE - MED/SURG/GYN PRIV*

## 2023-11-20 PROCEDURE — 700102 HCHG RX REV CODE 250 W/ 637 OVERRIDE(OP)

## 2023-11-20 PROCEDURE — 700111 HCHG RX REV CODE 636 W/ 250 OVERRIDE (IP): Mod: JZ | Performed by: SURGERY

## 2023-11-20 PROCEDURE — 700102 HCHG RX REV CODE 250 W/ 637 OVERRIDE(OP): Performed by: NURSE PRACTITIONER

## 2023-11-20 PROCEDURE — 99024 POSTOP FOLLOW-UP VISIT: CPT

## 2023-11-20 PROCEDURE — 97530 THERAPEUTIC ACTIVITIES: CPT

## 2023-11-20 PROCEDURE — A9270 NON-COVERED ITEM OR SERVICE: HCPCS

## 2023-11-20 PROCEDURE — 51798 US URINE CAPACITY MEASURE: CPT

## 2023-11-20 RX ADMIN — ENOXAPARIN SODIUM 40 MG: 100 INJECTION SUBCUTANEOUS at 10:14

## 2023-11-20 RX ADMIN — FAMOTIDINE 20 MG: 20 TABLET ORAL at 04:49

## 2023-11-20 RX ADMIN — LEVOTHYROXINE SODIUM 25 MCG: 0.03 TABLET ORAL at 17:00

## 2023-11-20 RX ADMIN — GABAPENTIN 300 MG: 300 CAPSULE ORAL at 17:00

## 2023-11-20 RX ADMIN — ATORVASTATIN CALCIUM 20 MG: 20 TABLET, FILM COATED ORAL at 17:01

## 2023-11-20 RX ADMIN — AMOXICILLIN AND CLAVULANATE POTASSIUM 1 TABLET: 875; 125 TABLET, FILM COATED ORAL at 17:00

## 2023-11-20 RX ADMIN — GABAPENTIN 300 MG: 300 CAPSULE ORAL at 23:48

## 2023-11-20 RX ADMIN — GABAPENTIN 300 MG: 300 CAPSULE ORAL at 04:49

## 2023-11-20 RX ADMIN — AMOXICILLIN AND CLAVULANATE POTASSIUM 1 TABLET: 875; 125 TABLET, FILM COATED ORAL at 04:49

## 2023-11-20 RX ADMIN — GABAPENTIN 300 MG: 300 CAPSULE ORAL at 11:39

## 2023-11-20 RX ADMIN — FERROUS SULFATE TAB 325 MG (65 MG ELEMENTAL FE) 325 MG: 325 (65 FE) TAB at 04:48

## 2023-11-20 RX ADMIN — OXYCODONE 2.5 MG: 5 TABLET ORAL at 23:47

## 2023-11-20 RX ADMIN — OXYCODONE 2.5 MG: 5 TABLET ORAL at 04:49

## 2023-11-20 RX ADMIN — TAMSULOSIN HYDROCHLORIDE 0.4 MG: 0.4 CAPSULE ORAL at 10:14

## 2023-11-20 RX ADMIN — Medication 2000 UNITS: at 04:48

## 2023-11-20 ASSESSMENT — COGNITIVE AND FUNCTIONAL STATUS - GENERAL
MOBILITY SCORE: 19
MOVING FROM LYING ON BACK TO SITTING ON SIDE OF FLAT BED: A LITTLE
TURNING FROM BACK TO SIDE WHILE IN FLAT BAD: A LITTLE
SUGGESTED CMS G CODE MODIFIER MOBILITY: CK
CLIMB 3 TO 5 STEPS WITH RAILING: A LITTLE
MOVING TO AND FROM BED TO CHAIR: A LITTLE
WALKING IN HOSPITAL ROOM: A LITTLE

## 2023-11-20 ASSESSMENT — GAIT ASSESSMENTS
DISTANCE (FEET): 5
GAIT LEVEL OF ASSIST: SUPERVISED
DEVIATION: BRADYKINETIC
ASSISTIVE DEVICE: FRONT WHEEL WALKER

## 2023-11-20 NOTE — DISCHARGE PLANNING
Case Management Discharge Planning    Admission Date: 11/5/2023  GMLOS: 9.9  ALOS: 15    6-Clicks ADL Score: 20  6-Clicks Mobility Score: 18      Anticipated Discharge Dispo: Discharge Disposition: D/T to short term gen hosp for IP care w/ planned IP readmit (82)  Discharge Address: daughter's home [address] [place] [address]  Discharge Contact Phone Number: correct on face sheet    DME Needed: Yes    DME Ordered: Yes    Action(s) Taken: Updated Provider/Nurse on Discharge Plan    Escalations Completed: None    Medically Clear: Yes    Next Steps: ARI called [facility] [address] [place] [address] and spoke with Melodie (p) [phone] who informed this writer; patient is accepted for [facility]. Per Melodie, due to the holiday's; pt will not be seen until 11/27/23. Melodie requested name of pt's current PCP and will need a DC summary at time of DC. SW met with patient to inform of  acceptance and start of care, 11/27/23. Patient informed this writer, pt's PCP is Dr. Benavidez. SW called Enedina of [facility] p) [phone] who informed this writer; pt is approved for a ready wound vac located in the CM office. ARI called Melodie [facility] to provide PCP's name. ARI informed NP Kalyani Pate via voalte; [facility] can not see patient until 11/27/23. SW to remain available for discharge planning needs.     Barriers to Discharge: DME provide patient with wound vac; [facility] can not see pt until 11/27/23 due to the holiday staffing.     Is the patient up for discharge tomorrow: No    Addendum: 11/22/23 13:08  ARI informed by Cielo, wound care; pt 's daughter will pick patient up for DC on 11/21/23 and assist with wound care dressing changes until HH sees pt on 11/27/23. ARI called Enedina (p) [phone] of [facility] to provide update. SW to obtain wound vac tomorrow, 11/21/23 prior to discharge. ARI called Melodie [facility] (p) [phone] and left a voicemail to provide update with this writer's contact information.

## 2023-11-20 NOTE — CARE PLAN
Problem: Knowledge Deficit - Standard  Goal: Patient and family/care givers will demonstrate understanding of plan of care, disease process/condition, diagnostic tests and medications  Outcome: Progressing     Problem: Fluid Volume  Goal: Fluid volume balance will be maintained  Outcome: Progressing     Problem: Respiratory  Goal: Patient will achieve/maintain optimum respiratory ventilation and gas exchange  Outcome: Progressing   The patient is Stable - Low risk of patient condition declining or worsening    Shift Goals  Clinical Goals: Wound Vac Management  Patient Goals: Discharge Plan Updates  Family Goals: N/A    Progress made toward(s) clinical / shift goals: Patient on Room Air, Joselin per Flowsheets, Educated patient on Discharge Plan, Patent Verbalized Understanding     Patient is not progressing towards the following goals:

## 2023-11-20 NOTE — CARE PLAN
Problem: Hemodynamics  Goal: Patient's hemodynamics, fluid balance and neurologic status will be stable or improve  Outcome: Progressing     Problem: Respiratory  Goal: Patient will achieve/maintain optimum respiratory ventilation and gas exchange  Outcome: Progressing     Problem: Pain - Standard  Goal: Alleviation of pain or a reduction in pain to the patient’s comfort goal  Outcome: Progressing     Problem: Skin Integrity  Goal: Skin integrity is maintained or improved  Outcome: Progressing   The patient is Stable - Low risk of patient condition declining or worsening    Shift Goals  Clinical Goals: voiding schedule; q6 straight cath; ambulate; pain mgmt; WV mgmt  Patient Goals: wound mgmt; walk; void  Family Goals: N/A    Progress made toward(s) clinical / shift goals:  yes

## 2023-11-20 NOTE — PROGRESS NOTES
A&O x 4. On room air.  Patient verbalizes 2/10 acute pain. Pain managed with prescribed and PRN medications.  Denies SOB/chest pain/numbness or tingling.  Skin per flow sheets. MLI with W[place].  Denies N/V. Tolerating regular diet.  + void, + BM 11/19.  Pt can ambulate with x1 assist using a FWW.  SCDs on to BLE; lovenox in use for VTE prophylaxis.

## 2023-11-20 NOTE — WOUND TEAM
"Pt has been excepted to home health care in [place]. Unfortunately home health can't see patient until Monday 11/27/23. Discussed options with pt, pts daughter and Surgery APRN. Plan for wound team to change abdomen vac dressing on Tuesday 11/21/23. Vac dressing can then stay in place hooked to home vac until Friday 11/24/23. At that time daughter will remove wound vac dressing and apply wet to dry dressing and change 2x daily Friday, Saturday and Sunday. Home Health can see pt on Monday the 27th and reapply wound vac dressing. Supplies for wet to dry were left at bedside. Daughter is aware of how to change dressing. Instruction were placed in discharge navigator. Updated case management on POC. Below is the instructions which were placed in pts DC navigator    \"MID ABDOMEN OPEN INCISION: Pt has a wound vac dressing in place that was changed on Tuesday 11/21/23. Dressing must be changed or removed by FRIDAY 10/24/23. If unable to get vac dressing changed on Friday. Turn wound vac off. Remove dressing, all black foam must be removed from wound bed. Cleanse wound with wound cleanser and gauze. Pat dry. Apply no sting skin barrier to area immediately around wound. Using one continuous piece of roll gauze. Moisten gauze with saline. Gently fill wound bed with normal saline moistened roll gauze. Secured in place with ABD pad (Blue line away from wound) followed by hypafix tape. Change 2 times daily until home health can see patient to reapply wound vac dressing.\"   "

## 2023-11-20 NOTE — PROGRESS NOTES
"  DATE: 11/20/2023    Hospital Day 15  small bowel obstruction & volvulus .    Postoperative Day # 15 Exploratory laparotomy, small bowel resection, & ABThera Placement.    Postoperative Day # 15 Exploratory laparotomy, small bowel resection, primary anastomosis, & negative pressure dressing application.    INTERVAL EVENTS:  [facility] referral remains pending, case management to reach out today for approval.  Patient is medically clear for discharge, plan to discharge home once home health confirmed for wound care at home.    PHYSICAL EXAMINATION:  Vital Signs: /64   Pulse 89   Temp 36.3 °C (97.3 °F) (Temporal)   Resp 17   Ht 1.626 m (5' 4.02\")   Wt 53.3 kg (117 lb 8.1 oz)   SpO2 94%     Alert & oriented, afebrile, no acute distress.  Unlabored respirations tolerating room air.  Abdomen soft, non distended, & non tender.  Midline abdominal incision with vac intact & functioning.  Tolerating regular diet, no nausea or vomiting.   + bowel movements.    LABORATORY VALUES:  Recent Labs     11/18/23  0305 11/19/23  0845   WBC 10.6 10.2   RBC 2.40* 2.52*   HEMOGLOBIN 7.6* 7.8*   HEMATOCRIT 24.4* 25.1*   .7* 99.6*   MCH 31.7 31.0   MCHC 31.1* 31.1*   RDW 52.5* 50.5*   PLATELETCT 473* 539*   MPV 8.9* 8.5*     Recent Labs     11/18/23  0917 11/19/23  0845   SODIUM 134* 134*   POTASSIUM 3.4* 3.2*   CHLORIDE 103 103   CO2 21 22   GLUCOSE 133* 107*   BUN 8 8   CREATININE 0.75 0.63   CALCIUM 8.5 8.3*     IMAGING:  US-CHEST   Final Result      Left breast subcutaneous edema, without drainable fluid collection.      CT-CHEST,ABDOMEN,PELVIS WITH   Final Result      1. Small bowel ileus, with patent ileocolic anastomosis.   2. Interpolar region right renal cortical cyst.   3. Moderate right hydronephrosis.   4. Extensive anasarca with moderate ascites in the paracolic gutters and pelvis.   5. Moderate bilateral pleural effusions with passive atelectasis in the lung bases.   6. No evidence for " intra-abdominal abscess.      US-EXTREMITY VENOUS UPPER UNILAT LEFT   Final Result      US-TRAUMA VEIN SCREEN LOWER BILAT EXTREMITY   Final Result      DX-CHEST-PORTABLE (1 VIEW)   Final Result      Increased left pleural effusion with superimposed left basilar airspace disease versus atelectasis.      DX-CHEST-PORTABLE (1 VIEW)   Final Result         1.  Left basilar atelectasis, no focal infiltrate      DX-CHEST-PORTABLE (1 VIEW)   Final Result         1.  Left retrocardiac density suggesting subtle infiltrate.      DX-CHEST-PORTABLE (1 VIEW)   Final Result         1.  Left basilar atelectasis or early infiltrate.      EC-ECHOCARDIOGRAM COMPLETE W/ CONT   Final Result      DX-CHEST-PORTABLE (1 VIEW)   Final Result         1.  Left basilar atelectasis or early infiltrate.      DX-ABDOMEN FOR TUBE PLACEMENT   Final Result      1.  Enteric tube overlies the proximal stomach.      DX-CHEST-PORTABLE (1 VIEW)   Final Result      1.  There is no acute cardiopulmonary process.   2.  Satisfactory appearance of the tubes and lines.      US-EXTREMITY VENOUS LOWER BILAT   Final Result      OUTSIDE IMAGES-CT ABDOMEN /PELVIS   Final Result      OUTSIDE IMAGES-DX CHEST   Final Result      DX-CHEST-PORTABLE (1 VIEW)   Final Result      1.  There is no acute cardiopulmonary process.        ASSESSMENT AND PLAN:  * Small bowel obstruction (HCC)- (present on admission)  Assessment & Plan  11/5 Emergent Exploratory laparotomy with bowel resection.   Left in discontinuity / ABThera  11/7 Additional small bowel resected at ileum, and proximal anastomosis, fascial closure.    11/10 Zosyn day 4 of 4 from source control and closure.    11/13 CT abdomen pelvis completed for leukocytosis without evidence of intraabdominal abscess     Leukocytosis- (present on admission)  Assessment & Plan  11/12 WBC trend up to 14.9  - Chest xray with increased left effusion and atelectasis   - Bilateral lower extremity duplex, urinalysis, C diff stool  sample negative  11/13 WBC continue to trend up  - Swelling to left upper extremity, ultrasound with superficial thrombus  - CT chest / abdomen / pelvis with bilateral pleural effusions and atelectasis  11/14 WBC slight trend down  - Empiric vanco and cefepime initiated & blood cultures obtained  - MRSA swab negative, vanco discontinued  - Repeat UA negative  11/15 COVID & influenza negative  11/16 Left breast ultrasound unremarkable.  11/18 WBC normalized, transition to Augmentin.  11/19 Final blood cultures negative.    Urinary retention  Assessment & Plan  11/13 Arshad placed overnight for retention, Flomax initiated.     Anemia  Assessment & Plan  On oral iron supplementation outpatient.  11/8 Iron studies low, replacement ordered per pharmacy.  11/11 Hemglobin 6.9, 1 unit packed red cells transfused. Outpatient ferrous sulfate resumed.  Trend hemograms and transfuse packed red cells for hemoglobin less than 7.0    Acute deep vein thrombosis (DVT) (HCC)- (present on admission)  Assessment & Plan  11/5 Acute/subacute occlusive thrombus in the left posterior tibial vein.    11/8 Pharmacological DVT prophylaxis, Lovenox 40 mg Q day initiated.   11/12 Trauma bilateral lower extremity duplex negative for DVT.    No contraindication to deep vein thrombosis (DVT) prophylaxis- (present on admission)  Assessment & Plan  11/8 Pharmacological DVT prophylaxis, Lovenox initiated     HLD (hyperlipidemia)- (present on admission)  Assessment & Plan  Chronic condition treated with atorvastatin.  Holding maintenance medication during acute septic illness.  11/10 atorvastatin resumed.    Hypothyroid- (present on admission)  Assessment & Plan  Chronic condition treated with levothyroxine.  Holding maintenance medication during acute septic illness.  11/10 Resumed levothyroxine.         ____________________________________     YESICA Stevenson

## 2023-11-20 NOTE — DISCHARGE INSTR - WOUND CARE
MID ABDOMEN OPEN INCISION: Pt has a wound vac dressing in place that was changed on Tuesday 11/21/23. Dressing must be changed or removed by FRIDAY 10/24/23. If unable to get vac dressing changed on Friday. Turn wound vac off. Remove dressing, all black foam must be removed from wound bed. Cleanse wound with wound cleanser and gauze. Pat dry. Apply no sting skin barrier to area immediately around wound. Using one continuous piece of roll gauze. Moisten gauze with saline. Gently fill wound bed with normal saline moistened roll gauze. Secured in place with ABD pad (Blue line away from wound) followed by hypafix tape. Change 2 times daily until home health can see patient to reapply wound vac dressing.

## 2023-11-20 NOTE — PROGRESS NOTES
Assumed care of patient at 0645. Bedside report received. Assessment complete.  AA&Ox4. Denies CP/SOB.  Reporting 0/10 pain. Declined intervention at this time.  Educated patient regarding pharmacologic and non pharmacologic modalities for pain management.  Skin per flowsheets  Tolerating Regular diet. Denies N/V.  + void. + BM. Last BM 11/20  Pt ambulates SBA w FWW.  All needs met at this time. Call light within reach. Pt calls appropriately. Bed low and locked, non skid socks in place. Hourly rounding in place.

## 2023-11-21 ENCOUNTER — PHARMACY VISIT (OUTPATIENT)
Dept: PHARMACY | Facility: MEDICAL CENTER | Age: 78
End: 2023-11-21
Payer: MEDICARE

## 2023-11-21 VITALS
HEART RATE: 89 BPM | HEIGHT: 64 IN | BODY MASS INDEX: 20.06 KG/M2 | WEIGHT: 117.5 LBS | OXYGEN SATURATION: 92 % | SYSTOLIC BLOOD PRESSURE: 117 MMHG | RESPIRATION RATE: 18 BRPM | DIASTOLIC BLOOD PRESSURE: 51 MMHG | TEMPERATURE: 98.2 F

## 2023-11-21 PROCEDURE — RXMED WILLOW AMBULATORY MEDICATION CHARGE

## 2023-11-21 PROCEDURE — A9270 NON-COVERED ITEM OR SERVICE: HCPCS | Performed by: NURSE PRACTITIONER

## 2023-11-21 PROCEDURE — 51798 US URINE CAPACITY MEASURE: CPT

## 2023-11-21 PROCEDURE — 99024 POSTOP FOLLOW-UP VISIT: CPT

## 2023-11-21 PROCEDURE — 700111 HCHG RX REV CODE 636 W/ 250 OVERRIDE (IP): Mod: JZ | Performed by: SURGERY

## 2023-11-21 PROCEDURE — A9270 NON-COVERED ITEM OR SERVICE: HCPCS

## 2023-11-21 PROCEDURE — 700102 HCHG RX REV CODE 250 W/ 637 OVERRIDE(OP): Performed by: NURSE PRACTITIONER

## 2023-11-21 PROCEDURE — 700102 HCHG RX REV CODE 250 W/ 637 OVERRIDE(OP)

## 2023-11-21 PROCEDURE — 97605 NEG PRS WND THER DME<=50SQCM: CPT

## 2023-11-21 PROCEDURE — 97602 WOUND(S) CARE NON-SELECTIVE: CPT

## 2023-11-21 RX ORDER — TAMSULOSIN HYDROCHLORIDE 0.4 MG/1
0.4 CAPSULE ORAL
Qty: 14 CAPSULE | Refills: 0 | Status: SHIPPED | OUTPATIENT
Start: 2023-11-22 | End: 2023-12-06

## 2023-11-21 RX ADMIN — GABAPENTIN 300 MG: 300 CAPSULE ORAL at 05:09

## 2023-11-21 RX ADMIN — FERROUS SULFATE TAB 325 MG (65 MG ELEMENTAL FE) 325 MG: 325 (65 FE) TAB at 05:09

## 2023-11-21 RX ADMIN — Medication 2000 UNITS: at 05:09

## 2023-11-21 RX ADMIN — ENOXAPARIN SODIUM 40 MG: 100 INJECTION SUBCUTANEOUS at 09:07

## 2023-11-21 RX ADMIN — TAMSULOSIN HYDROCHLORIDE 0.4 MG: 0.4 CAPSULE ORAL at 09:07

## 2023-11-21 RX ADMIN — GABAPENTIN 300 MG: 300 CAPSULE ORAL at 11:58

## 2023-11-21 RX ADMIN — OXYCODONE 2.5 MG: 5 TABLET ORAL at 10:08

## 2023-11-21 NOTE — CARE PLAN
Problem: Urinary - Renal Perfusion  Goal: Ability to achieve and maintain adequate renal perfusion and functioning will improve  Outcome: Progressing     Problem: Pain - Standard  Goal: Alleviation of pain or a reduction in pain to the patient’s comfort goal  Outcome: Progressing     Problem: Hemodynamics  Goal: Patient's hemodynamics, fluid balance and neurologic status will be stable or improve  Outcome: Progressing       The patient is Stable - Low risk of patient condition declining or worsening    Shift Goals  Clinical Goals: wound vac management, pain control, rest  Patient Goals: comfort  Family Goals: N/A    Progress made toward(s) clinical / shift goals:  Pt rest this shift. Pt reported pain controlled per MAR. Pt bladder scan showed decrease in retention.

## 2023-11-21 NOTE — PROGRESS NOTES
Discharge orders acknowledged, Pt aware and compliant with new orders, D/C teaching completed, Pt able to verbalize needs and complaint with discharge orders. Personal belongings in pt possession. Medication delivered to pt prior to discharge. PIV removed.  Pt escorted off unit via wheelchair with assistance from CNA.

## 2023-11-21 NOTE — CARE PLAN
Problem: Knowledge Deficit - Standard  Goal: Patient and family/care givers will demonstrate understanding of plan of care, disease process/condition, diagnostic tests and medications  Outcome: Progressing     Problem: Fluid Volume  Goal: Fluid volume balance will be maintained  Outcome: Progressing     Problem: Pain - Standard  Goal: Alleviation of pain or a reduction in pain to the patient’s comfort goal  Outcome: Progressing     Problem: Fall Risk  Goal: Patient will remain free from falls  Outcome: Progressing   The patient is Stable - Low risk of patient condition declining or worsening    Shift Goals  Clinical Goals: Discharge, Void, Ambulate  Patient Goals: Discharge  Family Goals: N/A    Progress made toward(s) clinical / shift goals:  Patient Ambulated x2 this shift prior to Discharge lounge order, Educated patient on plan of care this shift, Patient denies pain at this time     Patient is not progressing towards the following goals:

## 2023-11-21 NOTE — DISCHARGE PLANNING
Case Management Discharge Planning    Admission Date: 11/5/2023  GMLOS: 9.9  ALOS: 16    6-Clicks ADL Score: 20  6-Clicks Mobility Score: 19      Anticipated Discharge Dispo: Discharge Disposition: D/T to home under [facility]HA care in anticipation of covered skilled care (06)  Discharge Address: daughter's home [address] [place] [id number]  Discharge Contact Phone Number: correct on face sheet    DME Needed: Yes    DME Ordered: Yes    Action(s) Taken: Chart review completed. Patient discussed during IDT rounds.     Patient will be discharged today pending delivery of wound vac. Per chart review, patient's daughter will assist with wound care dressings until the patient can be seen at [facility] on 11/27.    0945: Wound vac delivered to patient's bedside by [facility] (serial #[id number]). Enedina from [facility] notified.     1145: DC summary faxed to [facility] [phone]    Escalations Completed: None    Barriers to Discharge: None    Is the patient up for discharge tomorrow: Today, 11/21

## 2023-11-21 NOTE — THERAPY
"Physical Therapy   Daily Treatment     Patient Name: Rubi Haq  Age:  78 y.o., Sex:  female  Medical Record #: [id number]  Today's Date: 11/20/2023     Precautions  Precautions: Fall Risk  Comments: wound vac    Assessment    Pt received in bed and agreeable to PT session. Upon standing, pt became dizzy and requested to return to supine. Once back in bed, BP was stable. Pt reports she doesn't think she's been eating enough, encouraged to order anything she thought sounded good from the cafeteria. Will continue to follow for acute PT while pt remains in the hospital.    Plan    Treatment Plan Status: Continue Current Treatment Plan  Type of Treatment: Bed Mobility, Gait Training, Equipment, Neuro Re-Education / Balance, Stair Training, Therapeutic Activities, Therapeutic Exercise  Treatment Frequency: 4 Times per Week  Treatment Duration: Until Therapy Goals Met    DC Equipment Recommendations: None  Discharge Recommendations: Recommend home health for continued physical therapy services      Subjective    \"I haven't been eating enough, nothing here is appetizing. I think that's why I got dizzy\"     Objective       11/20/23 1614   Precautions   Precautions Fall Risk   Comments wound vac   Vitals   Pulse 87   Patient BP Position Supine   Blood Pressure  121/63   Vitals Comments Pt had sudden onset of dizziness with attempted ambulation, requested to lay back down. BP stable once in supine   Pain 0 - 10 Group   Therapist Pain Assessment Post Activity Pain Same as Prior to Activity;Nurse Notified  (no specific c/o pain)   Cognition    Level of Consciousness Alert   Comments Very pleasant and cooperative, limited by dizziness this session   Balance   Sitting Balance (Static) Fair +   Sitting Balance (Dynamic) Fair +   Standing Balance (Static) Fair   Standing Balance (Dynamic) Fair   Weight Shift Sitting Good   Weight Shift Standing Good   Skilled Intervention Verbal Cuing   Comments with FWW   Bed Mobility  "   Supine to Sit Supervised   Sit to Supine Supervised   Scooting Supervised   Rolling Supervised   Skilled Intervention Verbal Cuing;Sequencing;Compensatory Strategies   Comments HOB flat   Gait Analysis   Gait Level Of Assist Supervised   Assistive Device Front Wheel Walker   Distance (Feet) 5   # of Times Distance was Traveled 1   Deviation Bradykinetic   Skilled Intervention Verbal Cuing   Comments Returned back to EOB due to dizziness   Functional Mobility   Sit to Stand Supervised   Bed, Chair, Wheelchair Transfer Supervised   Transfer Method Stand Step   Skilled Intervention Verbal Cuing   How much difficulty does the patient currently have...   Turning over in bed (including adjusting bedclothes, sheets and blankets)? 3   Sitting down on and standing up from a chair with arms (e.g., wheelchair, bedside commode, etc.) 3   Moving from lying on back to sitting on the side of the bed? 3   How much help from another person does the patient currently need...   Moving to and from a bed to a chair (including a wheelchair)? 4   Need to walk in a hospital room? 3   Climbing 3-5 steps with a railing? 3   6 clicks Mobility Score 19   Short Term Goals    Short Term Goal # 1 in 6 visits patient will demo all functional transfers with sup and LRAD for safe DC   Goal Outcome # 1 Goal met   Short Term Goal # 2 in 6 visits patient will ambulate 100' w/FWW and Rossy for safe DC   Goal Outcome # 2 Goal met, new goal added   Short Term Goal # 2 B  Pt will ambulate 200ft with FWW and supervision to progress function in 6 visits.   Goal Outcome # 2 B Goal not met   Short Term Goal # 3 in 6 visits patient will demo all bed mobility indep for safe DC   Goal Outcome # 3 Goal met   Short Term Goal # 4 Pt will negotiate 2 stairs with min A via handheld assist to enter daughters home in 6 visits.   Goal Outcome # 4 Goal met   Physical Therapy Treatment Plan   Physical Therapy Treatment Plan Continue Current Treatment Plan   Anticipated  Discharge Equipment and Recommendations   DC Equipment Recommendations None   Discharge Recommendations Recommend home health for continued physical therapy services   Interdisciplinary Plan of Care Collaboration   IDT Collaboration with  Nursing   Patient Position at End of Therapy In Bed;Call Light within Reach;Tray Table within Reach;Phone within Reach   Collaboration Comments RN updated

## 2023-11-21 NOTE — PROGRESS NOTES
Home Wound Vac Education provided to Patient  Patient and Daughter verbalized understanding of Wound Vac Care  Wound Vac swapped to Home Unit, [facility] retained     Patient Voided, Bladder Scan Post Void Result = 114 mL   No straight cath's in the last 24 hours performed

## 2023-11-21 NOTE — PROGRESS NOTES
Report received from RN, assumed care at 1845  Pt is A0X4, and responds appropriately   Pt declines any SOB, chest pain, new onset of numbness/ tingling  Pt rates pain at 2/10, on a scale of 1-10  Pt has urinary retention, q6 bladder scans in place  Pt has - flatus, + bowel sounds, + BM on 11/20  Pt ambulates with a standby assist with a FWW   Pt is tolerating a regular diet, pt denies any nausea/vomiting  Plan of care discussed, all questions answered. Explained importance of calling before getting OOB and pt verbalizes understanding. Explained importance of oral care. Call light is within reach, treaded slipper socks on, bed in lowest/ locked position, hourly rounding in place, all needs met at this time

## 2023-11-21 NOTE — DISCHARGE PLANNING
0946  MELANIE drooped wound vac off to bedside, Called Enedina of [facility] to let her know wound vac is at bedside.    1013  Spoke To: Enedina  Outcome: MELANIE provided Enedina with wound vac serial number. Per Enedina to fax over paper for Pt to sign.  JESUS HOLLINGSWORTH notified.

## 2023-11-21 NOTE — PROGRESS NOTES
Assumed care of patient at 0645. Bedside report received. Assessment complete.  AA&Ox4. Denies CP/SOB.  Reporting 4/10 pain. Medicated per MAR.   Educated patient regarding pharmacologic and non pharmacologic modalities for pain management.  Skin per flowsheets  Tolerating Regular diet. Denies N/V.  + void. + BM. Last BM 11/21  Pt ambulates SBA w FWW.  All needs met at this time. Call light within reach. Pt calls appropriately. Bed low and locked, non skid socks in place. Hourly rounding in place.

## 2023-11-21 NOTE — WOUND TEAM
Assisted Cielo SCOTT (Wound RN), with wound care, non-selective debridement performed using wound cleanser/NS and gauze. Please see Cielo SCOTT (Wound RN) wound note for further wound care details.

## 2023-11-21 NOTE — WOUND TEAM
[facility]  Inpatient Services  Wound and Skin Care Follow-up    Admission Date: 11/5/2023     Last order of IP CONSULT TO WOUND CARE was found on 11/11/2023 from Hospital Encounter on 11/5/2023     HPI, PMH, SH: Reviewed    Past Surgical History:   Procedure Laterality Date    TN EXPLORATORY OF ABDOMEN N/A 11/7/2023    Procedure: LAPAROTOMY, EXPLORATORY 2ND LOOK;  Surgeon: Tobi Locke M.D.;  Location: SURGERY [facility];  Service: General    WOUND CLOSURE NEURO N/A 11/7/2023    Procedure: CLOSURE, WOUND;  Surgeon: Tobi Locke M.D.;  Location: SURGERY [facility];  Service: General    BOWEL RESECTION  11/7/2023    Procedure: EXCISION, INTESTINE;  Surgeon: Tobi Locke M.D.;  Location: [facility];  Service: General    TN EXPLORATORY OF ABDOMEN  11/5/2023    Procedure: LAPAROTOMY, EXPLORATORY,  SMALL BOWEL RESECTION vac greater than 50cm, abthera placement;  Surgeon: Doroteo York D.O.;  Location: [facility];  Service: Gen Robotic    FUSION, SPINE, LUMBAR, PLIF  6/10/2019    Procedure: FUSION, SPINE, LUMBAR, TLIF L4-5;  Surgeon: Amado Marquez M.D.;  Location: [facility];  Service: Neurosurgery    LUMBAR LAMINECTOMY DISKECTOMY  6/10/2019    Procedure: LAMINECTOMY, SPINE, LUMBAR, WITH DISCECTOMY- REDO;  Surgeon: Amado Marquez M.D.;  Location: [facility];  Service: Neurosurgery    OTHER  2018    glaucoma surgery right and left eye    OTHER  2015    yag laser surgery right eye    OTHER  2014    mohs basal cell nasal    OTHER  2014    basal cell nasal    GYN SURGERY  2011    hysterectomy    OTHER ORTHOPEDIC SURGERY  2010    right thumb    OTHER ORTHOPEDIC SURGERY  2010    left thumb    OTHER  2010    right iol    OTHER ORTHOPEDIC SURGERY  2007    spine    OTHER ORTHOPEDIC SURGERY  2007    dural leak    OTHER ORTHOPEDIC SURGERY  2002    carpel tunnel    GYN SURGERY  1983    c sec     Social History     Tobacco Use    Smoking status: Never     Smokeless tobacco: Never   Substance Use Topics    Alcohol use: Yes     Comment: 1 daily     No chief complaint on file.    Diagnosis: Small bowel obstruction (HCC) [K56.609]    Unit where seen by Wound Team: T434/02     WOUND FOLLOW UP RELATED TO:  Mid Abdomen       WOUND TEAM PLAN OF CARE - Frequency of Follow-up:   Nursing to follow dressing orders written for wound care. 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 Pt had abdominal surgery 1.5 years prior for lysis of adhesions. Pt presented to [facility] 1 day prior to admission at [facility] and CT revealed small bowel obstruction. Pt was transferred to [facility] for higher level of care and was noted to have elevated WBC, as well as elevated lactic acid and creatinine in combination with concerning abdominal exam. Pt was therefore taken to OR on 11/5/23 for Ex lap and abthera placement. Pt returned to OR on 11/7/23 for fascia closure and black foam wound vac application. Wound team was subsequently consulted to manage NPWT.       WOUND ASSESSMENT/LDA    Skin Risk/Juan Antonio   Sensory Perception: No Impairment  Moisture: Occasionally Moist  Activity: Walks Occasionally  Mobility: Slightly Limited  Nutrition: Adequate  Friction and Shear: No Apparent Problem  Total Score: 19  Skin Breakdown Risk: 18-23 Minimal Risk for Skin Breakdown    Sensory Interventions  Bed Types: Low Airloss  Skin Preventative Measures: Pillows in Use for Support / Positioning, Pillows in Use to Float Heels  Skin Preventative Dressing: Foam Sacral  Protector  Moisturizers/Barriers: Moisturizer   PT / OT Involved in Care: Physical Therapy Involved, Occupational Therapy Involved  Activity : Ambulated  Patient Turns / Repositioning: Patient Turns Self from Side to Side  Patient is Receiving Nutrition: Oral Intake Adequate  Nutrition Consult Ordered: No, Consult has Already been Placed  Vitamin Therapy in Use: No  Friction Interventions: Draw Sheet / Pad Used for Repositioning                Negative Pressure Wound Therapy 11/05/23 Surgical Abdomen (Active)   Wound Image   11/09/23 1300   Vacuum Serial Number [id number]    NPWT Pump Mode / Pressure Setting Ulta;Continuous;125 mmHg    Dressing Type Medium;Black Foam (Regular)    Number of Foam Pieces Used 2    Canister Changed No    Output (mL) 0 mL    NEXT Dressing Change/Treatment Date 11/23/23      Wound 11/05/23 Full Thickness Wound Open Incision Abdomen (Active)   Wound Image    11/21/23 1100   Site Assessment Red;Early/partial granulation    Periwound Assessment Clean;Dry;Intact    Margins Attached edges;Defined edges    Closure Secondary intention    Drainage Amount Small    Drainage Description Serosanguineous    Treatments Cleansed;Nonselective debridement;Site care    Wound Cleansing Approved Wound Cleanser    Periwound Protectant No-sting Skin Prep;Drape    Dressing Status Clean;Dry;Intact    Dressing Changed Changed    Dressing Cleansing/Solutions Not Applicable    Dressing Options Wound Vac    Dressing Change/Treatment Frequency Monday, Wednesday, Friday, and As Needed    NEXT Dressing Change/Treatment Date 11/24/23    NEXT Weekly Photo (Inpatient Only) 11/24/23    Wound Team Following 3x Weekly    Non-staged Wound Description Full thickness    Wound Length (cm) 20.5 cm    Wound Width (cm) 4.9 cm    Wound Depth (cm) 2.1 cm    Wound Surface Area (cm^2) 100.45 cm^2    Wound Volume (cm^3) 210.945 cm^3    Wound Healing % 17    Shape Linear    Wound Odor None    WOUND NURSE ONLY - Time Spent with Patient  (mins) 60      Vascular:    BARB:   No results found.    Lab Values:    Lab Results   Component Value Date/Time    WBC 10.2 11/19/2023 08:45 AM    RBC 2.52 (L) 11/19/2023 08:45 AM    HEMOGLOBIN 7.8 (L) 11/19/2023 08:45 AM    HEMATOCRIT 25.1 (L) 11/19/2023 08:45 AM         Culture Results show:  No results found for this or any previous visit (from the past 720 hour(s)).    Pain Level/Medicated:  4% Lidocaine allowed to dwell on wound bed 60min prior and PO pain medications administered by bedside RN 60min prior       INTERVENTIONS BY WOUND TEAM:  Chart and images reviewed. Discussed with bedside RN. All areas of concern (based on picture review, LDA review and discussion with bedside RN) have been thoroughly assessed. Documentation of areas based on significant findings. This RN in to assess patient. Performed standard wound care which includes appropriate positioning, dressing removal and non-selective debridement. Pictures and measurements obtained weekly if/when required.    Wound:  Mid Abdomen  Preparation for Dressing removal: Dressing soaked with adhesive remover spray and Dressing soaked with Lidocaine  Cleansed/Non-selectively Debrided with:  Wound cleanser and Gauze  Shira wound: Cleansed with Wound cleanser and Gauze, Prepped with No Sting and Drape  Primary Dressing:  One piece of full thickness black regular foam applied into wound bed and secured with drape.  Secondary (Outer) Dressing: A hole was cut in drape and a 2nd piece of half thickness circular black foam was applied as a button for trac pad. Trac pad applied and suction resumed. No leaks noted.    Advanced Wound Care Discharge Planning  Number of Clinicians necessary to complete wound care: 1  Is patient requiring IV pain medications for dressing changes:  No   Length of time for dressing change 30 min. (This does not include chart review, pre-medication time, set up, clean up or time spent charting.)    Interdisciplinary consultation: Patient,  Bedside RN (Esteban), Tayo CHENG (Wound RN).  Pressure injury and staging reviewed with N/A.    EVALUATION / RATIONALE FOR TREATMENT:     Date:  11/21/23  Wound Status:  Wound progressing as expected    Continues to progress. Daughter at bedside was educated on how to remove wound vac dressing and apply wet to dry dressing. Per daughter she works at a clinic that does wound vac changes and was able to get pt on schedule for vac change on Friday 11/24/23 but she is agreeable to removal of vac and application of wet to dry dressing if unable to get vac changed by Friday. Home vac at bedside. Home vac was set up and set on bedside table on  for DC. Pt has home supplies for DC and instructions in DC Navigator for wet to dry.    Date:  11/19/23  Wound Status:  Wound progressing as expected    Wound continues to progress. Continued with NPWT. Pt will likely DC home early this week to daughters home with home health.    Date:  11/17/23  Wound Status:  Wound progressing as expected    Wound continues to progress, minimal change from previous assessment. Continued with regular NPWT.     Date:  11/15/23  Wound Status:  Wound progressing as expected    Wound continues to progress as expected still has significant depth at distal end. Continued with NPWT.    Date:  11/13/23  Wound Status:  Wound progressing as expected    Wound still with significant depth at distal end, however wound is clean. Continued with NPWT.    Date:  11/11/23  Wound Status:  Wound progressing as expected  Less adipose tissue exposed today, tissue still appears easily friable. May benefit from transition to Veraflo NPWT as wound base becomes more robust if patient remains in-house (a veraflo kit and cassette are at bedside).  Plan is working on transferring back to [facility].    Date:  11/09/23  Wound Status:  Initial evaluation  Pt with fresh abdominal incision. Minimal granulation tissue noted that this point. Regular NPWT applied to  assist in granular tissue development and wound closure.         Goals: Steady decrease in wound area and depth weekly.    NURSING PLAN OF CARE ORDERS:  Dressing changes: See Dressing Care orders    NUTRITION RECOMMENDATIONS   Wound Team Recommendations:  N/A     DIET ORDERS (From admission to next 24h)       Start     Ordered    11/10/23 1641  Diet Order Diet: Regular  ALL MEALS        Question:  Diet:  Answer:  Regular    11/10/23 1640    11/10/23 1012  Supplements  ALL MEALS        Question:  Which Supplement  Answer:  OTHER (see comments)  Comment:  Ensure Clear    11/10/23 1012                  Anticipated discharge plans:  Home Health Care        Vac Discharge Needs:  Vac Discharge plan is purely a recommendation from wound team and not a requirement for discharge unless otherwise stated by physician.  Regular Vac in use and continued at discharge

## 2024-05-20 NOTE — PROGRESS NOTES
Addended by: JONELLE BRYAN on: 5/20/2024 05:31 PM     Modules accepted: Orders     HYPOGLYCEMIA INTERVENTION    0510 - POC glucose reading 66 --> dextrose 10% bolus: 25g administered (refer to MAR)    0527 - bolus complete    0550 - blood sugar recheck: 104    0650 - blood sugar check: 111

## 2024-09-25 ENCOUNTER — HOSPITAL ENCOUNTER (OUTPATIENT)
Dept: RADIOLOGY | Facility: MEDICAL CENTER | Age: 79
End: 2024-09-25

## 2024-09-26 ENCOUNTER — HOSPITAL ENCOUNTER (INPATIENT)
Facility: MEDICAL CENTER | Age: 79
LOS: 3 days | End: 2024-09-29
Attending: INTERNAL MEDICINE | Admitting: INTERNAL MEDICINE
Payer: MEDICARE

## 2024-09-26 ENCOUNTER — APPOINTMENT (OUTPATIENT)
Dept: RADIOLOGY | Facility: MEDICAL CENTER | Age: 79
End: 2024-09-26
Attending: STUDENT IN AN ORGANIZED HEALTH CARE EDUCATION/TRAINING PROGRAM
Payer: MEDICARE

## 2024-09-26 ENCOUNTER — HOSPITAL ENCOUNTER (OUTPATIENT)
Dept: RADIOLOGY | Facility: MEDICAL CENTER | Age: 79
End: 2024-09-26

## 2024-09-26 DIAGNOSIS — R78.81 BACTEREMIA: ICD-10-CM

## 2024-09-26 PROBLEM — K85.90 PANCREATITIS, UNSPECIFIED PANCREATITIS TYPE: Status: ACTIVE | Noted: 2024-09-26

## 2024-09-26 PROBLEM — Z71.89 ACP (ADVANCE CARE PLANNING): Status: ACTIVE | Noted: 2024-09-26

## 2024-09-26 PROBLEM — R74.01 TRANSAMINITIS: Status: ACTIVE | Noted: 2024-09-26

## 2024-09-26 LAB
ALBUMIN SERPL BCP-MCNC: 3.5 G/DL (ref 3.2–4.9)
ALBUMIN/GLOB SERPL: 1.4 G/DL
ALP SERPL-CCNC: 872 U/L (ref 30–99)
ALT SERPL-CCNC: 989 U/L (ref 2–50)
ANION GAP SERPL CALC-SCNC: 15 MMOL/L (ref 7–16)
AST SERPL-CCNC: 1537 U/L (ref 12–45)
BASOPHILS # BLD AUTO: 0.1 % (ref 0–1.8)
BASOPHILS # BLD: 0.01 K/UL (ref 0–0.12)
BILIRUB SERPL-MCNC: 1 MG/DL (ref 0.1–1.5)
BUN SERPL-MCNC: 22 MG/DL (ref 8–22)
CALCIUM ALBUM COR SERPL-MCNC: 9.5 MG/DL (ref 8.5–10.5)
CALCIUM SERPL-MCNC: 9.1 MG/DL (ref 8.5–10.5)
CHLORIDE SERPL-SCNC: 103 MMOL/L (ref 96–112)
CO2 SERPL-SCNC: 23 MMOL/L (ref 20–33)
CREAT SERPL-MCNC: 0.89 MG/DL (ref 0.5–1.4)
EOSINOPHIL # BLD AUTO: 0 K/UL (ref 0–0.51)
EOSINOPHIL NFR BLD: 0 % (ref 0–6.9)
ERYTHROCYTE [DISTWIDTH] IN BLOOD BY AUTOMATED COUNT: 45.7 FL (ref 35.9–50)
GFR SERPLBLD CREATININE-BSD FMLA CKD-EPI: 65 ML/MIN/1.73 M 2
GLOBULIN SER CALC-MCNC: 2.5 G/DL (ref 1.9–3.5)
GLUCOSE SERPL-MCNC: 109 MG/DL (ref 65–99)
HCT VFR BLD AUTO: 31.1 % (ref 37–47)
HGB BLD-MCNC: 9.9 G/DL (ref 12–16)
IMM GRANULOCYTES # BLD AUTO: 0.02 K/UL (ref 0–0.11)
IMM GRANULOCYTES NFR BLD AUTO: 0.2 % (ref 0–0.9)
LIPASE SERPL-CCNC: 135 U/L (ref 11–82)
LIPASE SERPL-CCNC: 2178 U/L (ref 11–82)
LYMPHOCYTES # BLD AUTO: 0.36 K/UL (ref 1–4.8)
LYMPHOCYTES NFR BLD: 4 % (ref 22–41)
MCH RBC QN AUTO: 30.9 PG (ref 27–33)
MCHC RBC AUTO-ENTMCNC: 31.8 G/DL (ref 32.2–35.5)
MCV RBC AUTO: 97.2 FL (ref 81.4–97.8)
MONOCYTES # BLD AUTO: 0.43 K/UL (ref 0–0.85)
MONOCYTES NFR BLD AUTO: 4.8 % (ref 0–13.4)
NEUTROPHILS # BLD AUTO: 8.15 K/UL (ref 1.82–7.42)
NEUTROPHILS NFR BLD: 90.9 % (ref 44–72)
NRBC # BLD AUTO: 0 K/UL
NRBC BLD-RTO: 0 /100 WBC (ref 0–0.2)
PLATELET # BLD AUTO: 213 K/UL (ref 164–446)
PMV BLD AUTO: 9.7 FL (ref 9–12.9)
POTASSIUM SERPL-SCNC: 3.6 MMOL/L (ref 3.6–5.5)
PROT SERPL-MCNC: 6 G/DL (ref 6–8.2)
RBC # BLD AUTO: 3.2 M/UL (ref 4.2–5.4)
SODIUM SERPL-SCNC: 141 MMOL/L (ref 135–145)
WBC # BLD AUTO: 9 K/UL (ref 4.8–10.8)

## 2024-09-26 PROCEDURE — 99222 1ST HOSP IP/OBS MODERATE 55: CPT | Performed by: SURGERY

## 2024-09-26 PROCEDURE — 87186 SC STD MICRODIL/AGAR DIL: CPT | Mod: 91

## 2024-09-26 PROCEDURE — 700105 HCHG RX REV CODE 258: Performed by: STUDENT IN AN ORGANIZED HEALTH CARE EDUCATION/TRAINING PROGRAM

## 2024-09-26 PROCEDURE — 700101 HCHG RX REV CODE 250: Performed by: STUDENT IN AN ORGANIZED HEALTH CARE EDUCATION/TRAINING PROGRAM

## 2024-09-26 PROCEDURE — 770006 HCHG ROOM/CARE - MED/SURG/GYN SEMI*

## 2024-09-26 PROCEDURE — 85025 COMPLETE CBC W/AUTO DIFF WBC: CPT

## 2024-09-26 PROCEDURE — 700111 HCHG RX REV CODE 636 W/ 250 OVERRIDE (IP): Performed by: STUDENT IN AN ORGANIZED HEALTH CARE EDUCATION/TRAINING PROGRAM

## 2024-09-26 PROCEDURE — 83690 ASSAY OF LIPASE: CPT

## 2024-09-26 PROCEDURE — 97162 PT EVAL MOD COMPLEX 30 MIN: CPT

## 2024-09-26 PROCEDURE — 700102 HCHG RX REV CODE 250 W/ 637 OVERRIDE(OP): Performed by: STUDENT IN AN ORGANIZED HEALTH CARE EDUCATION/TRAINING PROGRAM

## 2024-09-26 PROCEDURE — 74181 MRI ABDOMEN W/O CONTRAST: CPT

## 2024-09-26 PROCEDURE — A9270 NON-COVERED ITEM OR SERVICE: HCPCS | Performed by: STUDENT IN AN ORGANIZED HEALTH CARE EDUCATION/TRAINING PROGRAM

## 2024-09-26 PROCEDURE — 87015 SPECIMEN INFECT AGNT CONCNTJ: CPT | Mod: 91

## 2024-09-26 PROCEDURE — 36415 COLL VENOUS BLD VENIPUNCTURE: CPT

## 2024-09-26 PROCEDURE — 99223 1ST HOSP IP/OBS HIGH 75: CPT | Mod: 25,AI | Performed by: STUDENT IN AN ORGANIZED HEALTH CARE EDUCATION/TRAINING PROGRAM

## 2024-09-26 PROCEDURE — 87077 CULTURE AEROBIC IDENTIFY: CPT

## 2024-09-26 PROCEDURE — 700111 HCHG RX REV CODE 636 W/ 250 OVERRIDE (IP): Mod: JZ | Performed by: STUDENT IN AN ORGANIZED HEALTH CARE EDUCATION/TRAINING PROGRAM

## 2024-09-26 PROCEDURE — 87040 BLOOD CULTURE FOR BACTERIA: CPT | Mod: 91

## 2024-09-26 PROCEDURE — 99222 1ST HOSP IP/OBS MODERATE 55: CPT | Mod: AI | Performed by: INTERNAL MEDICINE

## 2024-09-26 PROCEDURE — 80053 COMPREHEN METABOLIC PANEL: CPT

## 2024-09-26 PROCEDURE — 99497 ADVNCD CARE PLAN 30 MIN: CPT | Performed by: STUDENT IN AN ORGANIZED HEALTH CARE EDUCATION/TRAINING PROGRAM

## 2024-09-26 PROCEDURE — 97165 OT EVAL LOW COMPLEX 30 MIN: CPT

## 2024-09-26 RX ORDER — LABETALOL HYDROCHLORIDE 5 MG/ML
10 INJECTION, SOLUTION INTRAVENOUS EVERY 4 HOURS PRN
Status: DISCONTINUED | OUTPATIENT
Start: 2024-09-26 | End: 2024-09-29 | Stop reason: HOSPADM

## 2024-09-26 RX ORDER — LEVOTHYROXINE SODIUM 25 UG/1
25 TABLET ORAL
Status: DISCONTINUED | OUTPATIENT
Start: 2024-09-26 | End: 2024-09-29 | Stop reason: HOSPADM

## 2024-09-26 RX ORDER — NAPROXEN 500 MG/1
500 TABLET ORAL NIGHTLY
COMMUNITY
Start: 2024-07-22

## 2024-09-26 RX ORDER — SODIUM CHLORIDE, SODIUM LACTATE, POTASSIUM CHLORIDE, CALCIUM CHLORIDE 600; 310; 30; 20 MG/100ML; MG/100ML; MG/100ML; MG/100ML
INJECTION, SOLUTION INTRAVENOUS CONTINUOUS
Status: DISCONTINUED | OUTPATIENT
Start: 2024-09-26 | End: 2024-09-28

## 2024-09-26 RX ORDER — HYDROMORPHONE HYDROCHLORIDE 1 MG/ML
0.5 INJECTION, SOLUTION INTRAMUSCULAR; INTRAVENOUS; SUBCUTANEOUS EVERY 4 HOURS PRN
Status: DISCONTINUED | OUTPATIENT
Start: 2024-09-26 | End: 2024-09-29 | Stop reason: HOSPADM

## 2024-09-26 RX ORDER — ONDANSETRON 2 MG/ML
4 INJECTION INTRAMUSCULAR; INTRAVENOUS EVERY 4 HOURS PRN
Status: DISCONTINUED | OUTPATIENT
Start: 2024-09-26 | End: 2024-09-29 | Stop reason: HOSPADM

## 2024-09-26 RX ORDER — ONDANSETRON 4 MG/1
4 TABLET, ORALLY DISINTEGRATING ORAL EVERY 4 HOURS PRN
Status: DISCONTINUED | OUTPATIENT
Start: 2024-09-26 | End: 2024-09-29 | Stop reason: HOSPADM

## 2024-09-26 RX ORDER — GABAPENTIN 300 MG/1
300 CAPSULE ORAL
Status: DISCONTINUED | OUTPATIENT
Start: 2024-09-26 | End: 2024-09-29 | Stop reason: HOSPADM

## 2024-09-26 RX ADMIN — SODIUM CHLORIDE, POTASSIUM CHLORIDE, SODIUM LACTATE AND CALCIUM CHLORIDE: 600; 310; 30; 20 INJECTION, SOLUTION INTRAVENOUS at 23:38

## 2024-09-26 RX ADMIN — PIPERACILLIN AND TAZOBACTAM 3.38 G: 3; .375 INJECTION, POWDER, FOR SOLUTION INTRAVENOUS at 05:38

## 2024-09-26 RX ADMIN — GABAPENTIN 300 MG: 300 CAPSULE ORAL at 11:55

## 2024-09-26 RX ADMIN — CEFTRIAXONE SODIUM 2000 MG: 10 INJECTION, POWDER, FOR SOLUTION INTRAVENOUS at 16:28

## 2024-09-26 RX ADMIN — GABAPENTIN 300 MG: 300 CAPSULE ORAL at 20:08

## 2024-09-26 RX ADMIN — HYDROMORPHONE HYDROCHLORIDE 0.5 MG: 1 INJECTION, SOLUTION INTRAMUSCULAR; INTRAVENOUS; SUBCUTANEOUS at 20:08

## 2024-09-26 RX ADMIN — GABAPENTIN 300 MG: 300 CAPSULE ORAL at 16:28

## 2024-09-26 RX ADMIN — GABAPENTIN 300 MG: 300 CAPSULE ORAL at 09:01

## 2024-09-26 RX ADMIN — SODIUM CHLORIDE, POTASSIUM CHLORIDE, SODIUM LACTATE AND CALCIUM CHLORIDE: 600; 310; 30; 20 INJECTION, SOLUTION INTRAVENOUS at 14:16

## 2024-09-26 RX ADMIN — SODIUM CHLORIDE, POTASSIUM CHLORIDE, SODIUM LACTATE AND CALCIUM CHLORIDE: 600; 310; 30; 20 INJECTION, SOLUTION INTRAVENOUS at 01:25

## 2024-09-26 RX ADMIN — PIPERACILLIN AND TAZOBACTAM 3.38 G: 3; .375 INJECTION, POWDER, FOR SOLUTION INTRAVENOUS at 02:58

## 2024-09-26 RX ADMIN — LEVOTHYROXINE SODIUM 25 MCG: 0.03 TABLET ORAL at 05:36

## 2024-09-26 RX ADMIN — PIPERACILLIN AND TAZOBACTAM 3.38 G: 3; .375 INJECTION, POWDER, FOR SOLUTION INTRAVENOUS at 13:22

## 2024-09-26 RX ADMIN — HYDROMORPHONE HYDROCHLORIDE 0.5 MG: 1 INJECTION, SOLUTION INTRAMUSCULAR; INTRAVENOUS; SUBCUTANEOUS at 14:20

## 2024-09-26 SDOH — ECONOMIC STABILITY: TRANSPORTATION INSECURITY
IN THE PAST 12 MONTHS, HAS THE LACK OF TRANSPORTATION KEPT YOU FROM MEDICAL APPOINTMENTS OR FROM GETTING MEDICATIONS?: NO

## 2024-09-26 SDOH — ECONOMIC STABILITY: TRANSPORTATION INSECURITY
IN THE PAST 12 MONTHS, HAS LACK OF RELIABLE TRANSPORTATION KEPT YOU FROM MEDICAL APPOINTMENTS, MEETINGS, WORK OR FROM GETTING THINGS NEEDED FOR DAILY LIVING?: NO

## 2024-09-26 ASSESSMENT — SOCIAL DETERMINANTS OF HEALTH (SDOH)
IN THE PAST 12 MONTHS, HAS THE ELECTRIC, GAS, OIL, OR WATER COMPANY THREATENED TO SHUT OFF SERVICE IN YOUR HOME?: NO
WITHIN THE PAST 12 MONTHS, YOU WORRIED THAT YOUR FOOD WOULD RUN OUT BEFORE YOU GOT THE MONEY TO BUY MORE: NEVER TRUE
WITHIN THE LAST YEAR, HAVE YOU BEEN KICKED, HIT, SLAPPED, OR OTHERWISE PHYSICALLY HURT BY YOUR PARTNER OR EX-PARTNER?: NO
WITHIN THE LAST YEAR, HAVE TO BEEN RAPED OR FORCED TO HAVE ANY KIND OF SEXUAL ACTIVITY BY YOUR PARTNER OR EX-PARTNER?: NO
WITHIN THE LAST YEAR, HAVE YOU BEEN HUMILIATED OR EMOTIONALLY ABUSED IN OTHER WAYS BY YOUR PARTNER OR EX-PARTNER?: NO
WITHIN THE LAST YEAR, HAVE YOU BEEN AFRAID OF YOUR PARTNER OR EX-PARTNER?: NO
WITHIN THE PAST 12 MONTHS, THE FOOD YOU BOUGHT JUST DIDN'T LAST AND YOU DIDN'T HAVE MONEY TO GET MORE: NEVER TRUE

## 2024-09-26 ASSESSMENT — COGNITIVE AND FUNCTIONAL STATUS - GENERAL
CLIMB 3 TO 5 STEPS WITH RAILING: A LITTLE
SUGGESTED CMS G CODE MODIFIER DAILY ACTIVITY: CH
SUGGESTED CMS G CODE MODIFIER MOBILITY: CH
PERSONAL GROOMING: A LITTLE
DAILY ACTIVITIY SCORE: 24
DAILY ACTIVITIY SCORE: 19
DRESSING REGULAR LOWER BODY CLOTHING: A LITTLE
MOBILITY SCORE: 24
WALKING IN HOSPITAL ROOM: A LITTLE
MOBILITY SCORE: 22
SUGGESTED CMS G CODE MODIFIER DAILY ACTIVITY: CK
HELP NEEDED FOR BATHING: A LITTLE
TOILETING: A LITTLE
SUGGESTED CMS G CODE MODIFIER MOBILITY: CJ
DRESSING REGULAR UPPER BODY CLOTHING: A LITTLE

## 2024-09-26 ASSESSMENT — LIFESTYLE VARIABLES
TOTAL SCORE: 0
AVERAGE NUMBER OF DAYS PER WEEK YOU HAVE A DRINK CONTAINING ALCOHOL: 0
TOTAL SCORE: 0
CONSUMPTION TOTAL: NEGATIVE
TOTAL SCORE: 0
ON A TYPICAL DAY WHEN YOU DRINK ALCOHOL HOW MANY DRINKS DO YOU HAVE: 0
HAVE PEOPLE ANNOYED YOU BY CRITICIZING YOUR DRINKING: NO
EVER HAD A DRINK FIRST THING IN THE MORNING TO STEADY YOUR NERVES TO GET RID OF A HANGOVER: NO
ALCOHOL_USE: NO
DOES PATIENT WANT TO STOP DRINKING: NO
HOW MANY TIMES IN THE PAST YEAR HAVE YOU HAD 5 OR MORE DRINKS IN A DAY: 0
HAVE YOU EVER FELT YOU SHOULD CUT DOWN ON YOUR DRINKING: NO
EVER FELT BAD OR GUILTY ABOUT YOUR DRINKING: NO

## 2024-09-26 ASSESSMENT — PAIN DESCRIPTION - PAIN TYPE
TYPE: ACUTE PAIN

## 2024-09-26 ASSESSMENT — ENCOUNTER SYMPTOMS
CHILLS: 1
CARDIOVASCULAR NEGATIVE: 1
EYES NEGATIVE: 1
RESPIRATORY NEGATIVE: 1
NAUSEA: 1
BACK PAIN: 1
VOMITING: 1
PSYCHIATRIC NEGATIVE: 1
NEUROLOGICAL NEGATIVE: 1
ABDOMINAL PAIN: 1

## 2024-09-26 ASSESSMENT — PATIENT HEALTH QUESTIONNAIRE - PHQ9
2. FEELING DOWN, DEPRESSED, IRRITABLE, OR HOPELESS: NOT AT ALL
1. LITTLE INTEREST OR PLEASURE IN DOING THINGS: NOT AT ALL
SUM OF ALL RESPONSES TO PHQ9 QUESTIONS 1 AND 2: 0

## 2024-09-26 ASSESSMENT — ACTIVITIES OF DAILY LIVING (ADL): TOILETING: INDEPENDENT

## 2024-09-26 ASSESSMENT — FIBROSIS 4 INDEX
FIB4 SCORE: 0.87

## 2024-09-26 ASSESSMENT — GAIT ASSESSMENTS
ASSISTIVE DEVICE: SINGLE POINT CANE
GAIT LEVEL OF ASSIST: SUPERVISED
DISTANCE (FEET): 200

## 2024-09-26 NOTE — PROGRESS NOTES
[facility] DIRECT ADMISSION REPORT  Transferring facility: [facility]  Transferring physician: Michele   Chief complaint: abdominal pain  Pertinent history & patient course: 79-year-old female with history of hypothyroidism, SBO, endometrial cancer, appendectomy, presented with complaints of right lower quadrant abdominal pain, nausea, vomiting, fever 38.8.  Lipase came back elevated at 12,000.  CT of the abdomen showed enlarged gallbladder without stone, pancreatic duct dilation 1 cm, no biliary dilation, no gallstones.  Chemistry showed elevated ALP, AST and ALT in the 300s.  Total bilirubin is not elevated.    Requested transfer for possible need for ERCP.  He was given IV fluids and IV Zosyn.  Consultants called prior to transfer and pertinent input from consultants:   Code Status:  per transferring provider, I personally verified with the transferring provider patient's code status and the transferring provider has confirmed this with the patient.  Reason for Transfer: Pancreatic duct dilation, pancreatitis, needs for ERCP  Further work up or recommendations requested prior to transfer:     Patient accepted for transfer: Yes  Accepting [facility] Facility: [facility] - Nursing to notify the Triage Coordinator in the [facility] via Voalte or Phone ext. [phone] when patient arrives to the unit. The Triage Coordinator will assign the admitting provider.    Consultants to be called upon arrival: Gastroenterology  Admission status: Inpatient.   Floor requested: MedSur  If ICU transfer, name of intensivist case discussed with and pertinent input from critical care: N/A    The admitting provider is the point of contact for questions or concerns regarding patient's care.

## 2024-09-26 NOTE — CARE PLAN
The patient is Stable - Low risk of patient condition declining or worsening         Progress made toward(s) clinical / shift goals:    Problem: Knowledge Deficit - Standard  Goal: Patient and family/care givers will demonstrate understanding of plan of care, disease process/condition, diagnostic tests and medications  Description: Target End Date:  1-3 days or as soon as patient condition allows    Document in Patient Education    1.  Patient and family/caregiver oriented to unit, equipment, visitation policy and means for communicating concern  2.  Complete/review Learning Assessment  3.  Assess knowledge level of disease process/condition, treatment plan, diagnostic tests and medications  4.  Explain disease process/condition, treatment plan, diagnostic tests and medications  Outcome: Progressing     Problem: Fall Risk  Goal: Patient will remain free from falls  Description: Target End Date:  Prior to discharge or change in level of care    Document interventions on the Watkins Raul Fall Risk Assessment    1.  Assess for fall risk factors  2.  Implement fall precautions  Outcome: Progressing       Patient is not progressing towards the following goals:

## 2024-09-26 NOTE — THERAPY
Occupational Therapy   Initial Evaluation     Patient Name: Rubi Haq  Age:  79 y.o., Sex:  female  Medical Record #: [id number]  Today's Date: 9/26/2024     Precautions  Precautions: Fall Risk    Assessment    Patient is 79 y.o. female admitted with abdominal pain and nausea/vomiting, found to have distended gall bladder with small stones. Other pertinent medical history includes HLD, endometrial cancer, DVT, and anemia. Pt seen for OT evaluation. Pt required supv to don/doff socks, toilet transfer, and standing g/h. Pt lives alone but reported that she has a supportive son and daughter in law who live nearby and can assist as needed. Patient will not be actively followed for occupational therapy services at this time, however may be seen if requested by physician for 1 more visit within 30 days to address any discharge or equipment needs.      Plan    Occupational Therapy Initial Treatment Plan   Duration: Discharge Needs Only    DC Equipment Recommendations: None  Discharge Recommendations: Anticipate that the patient will have no further occupational therapy needs after discharge from the hospital      Objective     09/26/24 1041   Prior Living Situation   Prior Services None   Housing / Facility 3 Story House  (rarely goes to third floor)   Steps Into Home 3   Bathroom Set up Walk In Shower;Shower Chair;Grab Bars   Equipment Owned Single Point Cane;Raised Toilet Seat With Arms;Tub / Shower Seat;Grab Bar(s) In Tub / Shower   Lives with - Patient's Self Care Capacity Alone and Able to Care For Self   Comments Pt lives alone. Pt reported that she has a son and daughter who live nearby who can assist as needed.   Prior Level of ADL Function   Self Feeding Independent   Grooming / Hygiene Independent   Bathing Independent   Dressing Independent   Toileting Independent   Prior Level of IADL Function   Medication Management Independent   Laundry Independent   Kitchen Mobility Independent   Finances Independent    Home Management Independent   Shopping Independent   Prior Level Of Mobility Independent Without Device in Community;Independent Without Device in Home   Driving / Transportation Driving Independent   Occupation (Pre-Hospital Vocational) Retired Due To Age   History of Falls   History of Falls No   Precautions   Precautions Fall Risk   Pain   Pain Scales 0 to 10 Scale    Pain 0 - 10 Group   Therapist Pain Assessment Post Activity Pain Same as Prior to Activity;Nurse Notified  (not rated, agreeable to session)   Cognition    Cognition / Consciousness WDL   Comments very pleasant and cooperative   Passive ROM Upper Body   Passive ROM Upper Body WDL   Active ROM Upper Body   Active ROM Upper Body  WDL   Strength Upper Body   Upper Body Strength  WDL   Sensation Upper Body   Upper Extremity Sensation  WDL   Upper Body Muscle Tone   Upper Body Muscle Tone  WDL   Coordination Upper Body   Coordination WDL   Balance Assessment   Sitting Balance (Static) Fair +   Sitting Balance (Dynamic) Fair +   Standing Balance (Static) Fair   Standing Balance (Dynamic) Fair   Weight Shift Sitting Good   Weight Shift Standing Good   Comments w/ SPC   Bed Mobility    Supine to Sit Supervised   Sit to Supine Supervised   Scooting Supervised   Rolling Supervised   Comments no use of bed features   ADL Assessment   Grooming Supervision;Standing  (washed hands at sink)   Lower Body Dressing Supervision  (don/doff socks)   Toileting Supervision  (urine on toilet)   Functional Mobility   Sit to Stand Supervised   Bed, Chair, Wheelchair Transfer Supervised   Toilet Transfers Supervised   Transfer Method Stand Step   Mobility EOB>bathroom>sink>bed   Comments w/ SPC   Visual Perception   Visual Perception  Not Tested   Activity Tolerance   Sitting in Chair <5 min on toilet   Sitting Edge of Bed >5 min   Standing <5 min   Comments functional for eval   Education Group   Education Provided Role of Occupational Therapist;Activities of Daily Living    Role of Occupational Therapist Patient Response Patient;Acceptance;Explanation;Verbal Demonstration   ADL Patient Response Patient;Acceptance;Explanation;Demonstration;Verbal Demonstration;Action Demonstration

## 2024-09-26 NOTE — H&P
Hospital Medicine History & Physical Note    Date of Service  9/26/2024    Primary Care Physician  Sheng Negro M.D. (Inactive)    Consultants  General surgery    Code Status  DNAR/DNI    Chief Complaint  Transaminitis    History of Presenting Illness  Rubi Haq is a 79 y.o. female who presented 9/26/2024 with abdominal pain.  Patient presents with epigastric and right upper quad abdominal pain for the last few days.  She endorses associated nausea and vomiting, subjective fever and chills.  He decided come to outside facility ER for further evaluation.    Labs outside hospital:  White blood cell count 5.1 hemoglobin 11.5 platelet 237  Sodium 141 potassium 3.7 chloride 107 bicarbonate 27 alkaline phosphatase 686   BUN 27 glucose 109 creatinine 0.9  Lipase 12,126  Lactic acid 1.6  Patient had abdominal ultrasound showing distended gallbladder, no gallbladder wall thickening.  There is a positive sonographic Ornelas sign is likely significant as possible small stones noted in the gallbladder.  CT abdomen pelvis showing distended gallbladder.  Questionable gallbladder wall thickening.  Dilatation of the common bile duct.    She was then referred to [facility] for ERCP.    I discussed the plan of care with patient.    Review of Systems  Review of Systems   Constitutional:  Positive for chills and malaise/fatigue.   HENT: Negative.     Eyes: Negative.    Respiratory: Negative.     Cardiovascular: Negative.    Gastrointestinal:  Positive for abdominal pain, nausea and vomiting.   Genitourinary: Negative.    Musculoskeletal:  Positive for back pain.   Skin: Negative.    Neurological: Negative.    Endo/Heme/Allergies: Negative.    Psychiatric/Behavioral: Negative.         Past Medical History   has a past medical history of Arthritis, Cancer (HCC) (2011), Cancer (HCC), Disorder of thyroid, Encephalopathy (11/5/2023), High cholesterol, and Pain.    Surgical History   has a past surgical history that  includes other orthopedic surgery (2002); other orthopedic surgery (2007); other orthopedic surgery (2007); other orthopedic surgery (2010); other orthopedic surgery (2010); gyn surgery (1983); gyn surgery (2011); other (2010); other (2014); other (2014); other (2015); other (2018); fusion, spine, lumbar, plif (6/10/2019); lumbar laminectomy diskectomy (6/10/2019); pr exploratory of abdomen (11/5/2023); pr exploratory of abdomen (N/A, 11/7/2023); wound closure neuro (N/A, 11/7/2023); and bowel resection (11/7/2023).     Family History  family history is not on file.   Family history reviewed with patient. There is no family history that is pertinent to the chief complaint.     Social History   reports that she has never smoked. She has never used smokeless tobacco. She reports current alcohol use. She reports that she does not use drugs.    Allergies  Allergies   Allergen Reactions    Nickel Rash     Rash from thierno    Tramadol      With drawl like symptom after stopped taking       Medications  Prior to Admission Medications   Prescriptions Last Dose Informant Patient Reported? Taking?   Cholecalciferol (VITAMIN D3) 2000 UNIT Cap  Patient Yes No   Sig: Take 1 Cap by mouth every day.   acetaminophen (TYLENOL) 500 MG Tab  Patient Yes No   Sig: Take 500-1,000 mg by mouth every 6 hours as needed.   acyclovir (ZOVIRAX) 200 MG Cap   Yes No   Sig: Take 800 mg by mouth 2 times a day.   atorvastatin (LIPITOR) 20 MG Tab   Yes No   Sig: Take 20 mg by mouth every evening.   docusate sodium (COLACE) 100 MG Cap   Yes No   Sig: Take 100 mg by mouth 2 times a day as needed for Constipation.   ferrous sulfate 325 (65 Fe) MG tablet  Patient Yes No   Sig: Take 325 mg by mouth every day.   gabapentin (NEURONTIN) 300 MG Cap  Patient Yes No   Sig: Take 300 mg by mouth 4 times a day.   levothyroxine (SYNTHROID) 25 MCG Tab  Patient Yes No   Sig: Take 25 mcg by mouth every evening.   therapeutic multivitamin-minerals (THERAGRAN-M) Tab   "Patient Yes No   Sig: Take 1 Tab by mouth every day.   tizanidine (ZANAFLEX) 2 MG tablet   No No   Sig: Take 1 Tab by mouth 3 times a day as needed.      Facility-Administered Medications: None       Physical Exam                             Physical Exam  Constitutional:       Appearance: Normal appearance. She is normal weight.   HENT:      Head: Normocephalic.      Nose: Nose normal.      Mouth/Throat:      Mouth: Mucous membranes are moist.   Eyes:      Extraocular Movements: Extraocular movements intact.      Conjunctiva/sclera: Conjunctivae normal.      Pupils: Pupils are equal, round, and reactive to light.   Cardiovascular:      Rate and Rhythm: Normal rate and regular rhythm.      Pulses: Normal pulses.   Pulmonary:      Effort: Pulmonary effort is normal.      Breath sounds: Normal breath sounds.   Abdominal:      General: Abdomen is flat. Bowel sounds are normal.      Palpations: Abdomen is soft.      Comments: Ornelas sign negative   Musculoskeletal:         General: Normal range of motion.      Cervical back: Neck supple.   Skin:     General: Skin is warm.   Neurological:      General: No focal deficit present.      Mental Status: She is alert and oriented to person, place, and time. Mental status is at baseline.   Psychiatric:         Mood and Affect: Mood normal.         Behavior: Behavior normal.         Thought Content: Thought content normal.         Judgment: Judgment normal.         Laboratory:          No results for input(s): \"ALTSGPT\", \"ASTSGOT\", \"ALKPHOSPHAT\", \"TBILIRUBIN\", \"DBILIRUBIN\", \"GAMMAGT\", \"AMYLASE\", \"LIPASE\", \"ALB\", \"PREALBUMIN\", \"GLUCOSE\" in the last 72 hours.      No results for input(s): \"NTPROBNP\" in the last 72 hours.      No results for input(s): \"TROPONINT\" in the last 72 hours.    Imaging:  [id number]    (Results Pending)       no X-Ray or EKG requiring interpretation    Assessment/Plan:  Justification for Admission Status  I anticipate this patient will require at least " two midnights for appropriate medical management, necessitating inpatient admission because pt has transaminitis    Patient will need a Med/Surg bed on MEDICAL service .  The need is secondary to transaminitis.    * Transaminitis  Assessment & Plan  At outside hospital, found to have   alkaline phosphatase 686 total bilirubin 1.2 and lipase 12,000  High suspicion of choledocholithiasis  Right upper quadrant ultrasound showing positive sonographic Ornelas sign, distended gallbladder, no gallbladder wall thickening.  CT abdomen pelvis showing distended gallbladder with questionable gallbladder wall thickening and dilatation of the common bile duct  I spoke with general surgery, no immediate intervention overnight.  Follow official note  [place]  MRCP ordered  Fluids  Dilaudid as needed  GI consult in a.m.    ACP (advance care planning)  Assessment & Plan  16 minutes spent discussing goals of care with patient.  She had never discussed CODE STATUS in the past.  I explained to her the processes of chest compressions and intubation.  She states that she would like to be DNR/DNI, okay for medical treatment.    Pancreatitis, unspecified pancreatitis type- (present on admission)  Assessment & Plan  Possibly from choledocholithiasis  N.p.o.  Dilaudid     HLD (hyperlipidemia)- (present on admission)  Assessment & Plan  Hold Lipitor due to transaminitis    Hypothyroid- (present on admission)  Assessment & Plan  Synthroid        VTE prophylaxis: pharmacologic prophylaxis contraindicated due to possible procedure in am

## 2024-09-26 NOTE — ASSESSMENT & PLAN NOTE
Significant elevation of LFTs on admission.  Secondary to choledocholithiasis.  MRCP: Negative for common bile duct stones.  Biliary dilation with CBD measuring 10 mm.  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  - Continue clear liquid diet; do not advance to GI soft at this time  - Close monitoring for return of pain or new increase in transaminitis  - Continue IV Zosyn  - Continue conservative management  - Repeat CMP in a.m.

## 2024-09-26 NOTE — PROGRESS NOTES
MRCP reviewed, no acute cholecystitis  Trial clear liquid diet  If unable to tolerated clears recommends HIDA scan

## 2024-09-26 NOTE — PROGRESS NOTES
4 Eyes Skin Assessment Completed by JESUS Lambert and JESUS Farias.    Head WDL  Ears WDL  Nose WDL  Mouth WDL  Neck WDL  Breast/Chest WDL  Shoulder Blades WDL  Spine Incision- healed lower lumbar incision.  (R) Arm/Elbow/Hand WDL  (L) Arm/Elbow/Hand WDL  Abdomen WDL  Groin WDL  Scrotum/Coccyx/Buttocks WDL  (R) Leg Swelling  (L) Leg Swelling  (R) Heel/Foot/Toe WDL  (L) Heel/Foot/Toe WDL          Devices In Places: N/A      Interventions In Place TAP System    Possible Skin Injury No    Pictures Uploaded Into Epic Yes  Wound Consult Placed N/A  RN Wound Prevention Protocol Ordered No

## 2024-09-26 NOTE — ASSESSMENT & PLAN NOTE
Elevated LFTS with dilated GB without cholelithiasis  CT shows dilated GB  MRCP pending  Will follow

## 2024-09-26 NOTE — PROGRESS NOTES
Received blood culture results from  [facility] or gram negative rods. Results read back to verify. Notified MD Martinez of lab results

## 2024-09-26 NOTE — CONSULTS
Date of Consultation:  9/26/2024    Patient: : Rubi Haq  MRN: [id number]    Referring Physician:  Dr Martinez     GI:Eduard Clement M.D.     Reason for Consultation: Pancreatitis, elevated biochemical liver test, dilated common bile duct    History of Present Illness:   79-year-old female who felt like she had the flu over the past several days.  Having intermittent epigastric distress.  Severe abdominal pain led her to the emergency department.  Patient has been diagnosed with pancreatitis.  Her biochemical liver tests demonstrated severe transaminitis.  Bilirubin is normal.  Patient underwent MRI which demonstrated a mild dilation of the bile duct to 10 mm with no obvious stone.  Surgery has been consulted.  They are contemplating cholecystectomy.      Past Medical History:   Diagnosis Date    Encephalopathy 11/5/2023    Cancer (HCC) 2011    endometrial  chemo/radiation    Arthritis     rheumatoid spine/hips    Cancer (HCC)     basal cell/nose    Disorder of thyroid     High cholesterol     Pain     low back legs       Past Surgical History:   Procedure Laterality Date    MD EXPLORATORY OF ABDOMEN N/A 11/7/2023    Procedure: LAPAROTOMY, EXPLORATORY 2ND LOOK;  Surgeon: Tobi Locke M.D.;  Location: [facility];  Service: General    WOUND CLOSURE NEURO N/A 11/7/2023    Procedure: CLOSURE, WOUND;  Surgeon: Tobi Locke M.D.;  Location: [facility];  Service: General    BOWEL RESECTION  11/7/2023    Procedure: EXCISION, INTESTINE;  Surgeon: Tobi Locke M.D.;  Location: [facility];  Service: General    MD EXPLORATORY OF ABDOMEN  11/5/2023    Procedure: LAPAROTOMY, EXPLORATORY,  SMALL BOWEL RESECTION vac greater than 50cm, abthera placement;  Surgeon: Doroteo York D.O.;  Location: [facility];  Service: Gen Robotic    FUSION, SPINE, LUMBAR, PLIF  6/10/2019    Procedure: FUSION, SPINE, LUMBAR, TLIF L4-5;  Surgeon: Amado Marquez M.D.;  Location: [facility];  Service: Neurosurgery    LUMBAR LAMINECTOMY DISKECTOMY  6/10/2019    Procedure: LAMINECTOMY, SPINE, LUMBAR, WITH DISCECTOMY- REDO;  Surgeon: Amado Marquez M.D.;  Location: [facility];  Service: Neurosurgery    OTHER  2018    glaucoma surgery right and left eye    OTHER  2015    yag laser surgery right eye    OTHER  2014    mohs basal cell nasal    OTHER  2014    basal cell nasal    GYN SURGERY  2011    hysterectomy    OTHER ORTHOPEDIC SURGERY  2010    right thumb    OTHER ORTHOPEDIC SURGERY  2010    left thumb    OTHER  2010    right iol    OTHER ORTHOPEDIC SURGERY  2007    spine    OTHER ORTHOPEDIC SURGERY  2007    dural leak    OTHER ORTHOPEDIC SURGERY  2002    carpel tunnel    GYN SURGERY  1983    c sec       History reviewed. No pertinent family history.    Social History     Socioeconomic History    Marital status:    Tobacco Use    Smoking status: Never    Smokeless tobacco: Never   Substance and Sexual Activity    Alcohol use: Yes     Comment: 1 daily    Drug use: No     Social Determinants of Health     Food Insecurity: No Food Insecurity (9/26/2024)    Hunger Vital Sign     Worried About Running Out of Food in the Last Year: Never true     Ran Out of Food in the Last Year: Never true   Transportation Needs: No Transportation Needs (9/26/2024)    PRAPARE - Transportation     Lack of Transportation (Medical): No     Lack of Transportation (Non-Medical): No   Intimate Partner Violence: Not At Risk (9/26/2024)    Humiliation, Afraid, Rape, and Kick questionnaire     Fear of Current or Ex-Partner: No     Emotionally Abused: No     Physically Abused: No     Sexually Abused: No   Housing Stability: Low Risk  (9/26/2024)    Housing Stability Vital Sign     Unable to Pay for Housing in the Last Year: No     Number of Times Moved in the Last Year: 0     Homeless in the Last Year: No       Current Meds (name, sig, last dose):     Current Facility-Administered Medications:      "LR    ondansetron    ondansetron    labetalol    levothyroxine    gabapentin    HYDROmorphone    cefTRIAXone (Rocephin) 1 g in  mL IVPB      ROS  10 systems reviewed and are negative unless otherwise noted in history of present illness.    Physical Exam:  Vitals:    09/26/24 0116 09/26/24 0117 09/26/24 0432 09/26/24 0800   BP:  113/55 128/72 96/45   Pulse:  75 67 70   Resp:  18 18 18   Temp:  36.6 °C (97.9 °F) 36.3 °C (97.3 °F) 36.4 °C (97.5 °F)   TempSrc:  Temporal Temporal Temporal   SpO2:  93% 94% 94%   Weight: 56.6 kg (124 lb 12.5 oz) 57.4 kg (126 lb 8.7 oz)     Height: 1.626 m (5' 4\")          Physical Exam  Vitals reviewed.   Constitutional:       General: She is not in acute distress.     Appearance: She is ill-appearing.   HENT:      Mouth/Throat:      Mouth: Mucous membranes are moist.   Eyes:      General: No scleral icterus.  Cardiovascular:      Rate and Rhythm: Regular rhythm.   Pulmonary:      Breath sounds: Normal breath sounds.   Abdominal:      General: Bowel sounds are normal.      Palpations: Abdomen is soft.   Neurological:      General: No focal deficit present.      Mental Status: She is alert and oriented to person, place, and time.   Psychiatric:         Behavior: Behavior normal.         Judgment: Judgment normal.           Labs:  Recent Labs     09/26/24  0109   SODIUM 141   POTASSIUM 3.6   CHLORIDE 103   CO2 23   BUN 22   CREATININE 0.89   CALCIUM 9.1     Recent Labs     09/26/24  0109   ALTSGPT 989*   ASTSGOT 1537*   ALKPHOSPHAT 872*   TBILIRUBIN 1.0   LIPASE 2178*   GLUCOSE 109*     Recent Labs     09/26/24  0109   WBC 9.0   NEUTSPOLYS 90.90*   LYMPHOCYTES 4.00*   MONOCYTES 4.80   EOSINOPHILS 0.00   BASOPHILS 0.10   ASTSGOT 1537*   ALTSGPT 989*   ALKPHOSPHAT 872*   TBILIRUBIN 1.0     Recent Labs     09/26/24  0109   RBC 3.20*   HEMOGLOBIN 9.9*   HEMATOCRIT 31.1*   PLATELETCT 213     Recent Results (from the past 24 hour(s))   Comp Metabolic Panel (CMP)    Collection Time: 09/26/24  " 1:09 AM   Result Value Ref Range    Sodium 141 135 - 145 mmol/L    Potassium 3.6 3.6 - 5.5 mmol/L    Chloride 103 96 - 112 mmol/L    Co2 23 20 - 33 mmol/L    Anion Gap 15.0 7.0 - 16.0    Glucose 109 (H) 65 - 99 mg/dL    Bun 22 8 - 22 mg/dL    Creatinine 0.89 0.50 - 1.40 mg/dL    Calcium 9.1 8.5 - 10.5 mg/dL    Correct Calcium 9.5 8.5 - 10.5 mg/dL    AST(SGOT) 1537 (HH) 12 - 45 U/L    ALT(SGPT) 989 (H) 2 - 50 U/L    Alkaline Phosphatase 872 (H) 30 - 99 U/L    Total Bilirubin 1.0 0.1 - 1.5 mg/dL    Albumin 3.5 3.2 - 4.9 g/dL    Total Protein 6.0 6.0 - 8.2 g/dL    Globulin 2.5 1.9 - 3.5 g/dL    A-G Ratio 1.4 g/dL   CBC with Differential    Collection Time: 09/26/24  1:09 AM   Result Value Ref Range    WBC 9.0 4.8 - 10.8 K/uL    RBC 3.20 (L) 4.20 - 5.40 M/uL    Hemoglobin 9.9 (L) 12.0 - 16.0 g/dL    Hematocrit 31.1 (L) 37.0 - 47.0 %    MCV 97.2 81.4 - 97.8 fL    MCH 30.9 27.0 - 33.0 pg    MCHC 31.8 (L) 32.2 - 35.5 g/dL    RDW 45.7 35.9 - 50.0 fL    Platelet Count 213 164 - 446 K/uL    MPV 9.7 9.0 - 12.9 fL    Neutrophils-Polys 90.90 (H) 44.00 - 72.00 %    Lymphocytes 4.00 (L) 22.00 - 41.00 %    Monocytes 4.80 0.00 - 13.40 %    Eosinophils 0.00 0.00 - 6.90 %    Basophils 0.10 0.00 - 1.80 %    Immature Granulocytes 0.20 0.00 - 0.90 %    Nucleated RBC 0.00 0.00 - 0.20 /100 WBC    Neutrophils (Absolute) 8.15 (H) 1.82 - 7.42 K/uL    Lymphs (Absolute) 0.36 (L) 1.00 - 4.80 K/uL    Monos (Absolute) 0.43 0.00 - 0.85 K/uL    Eos (Absolute) 0.00 0.00 - 0.51 K/uL    Baso (Absolute) 0.01 0.00 - 0.12 K/uL    Immature Granulocytes (abs) 0.02 0.00 - 0.11 K/uL    NRBC (Absolute) 0.00 K/uL   BLOOD CULTURE    Collection Time: 09/26/24  1:09 AM    Specimen: Peripheral; Blood   Result Value Ref Range    Significant Indicator NEG     Source BLD     Site PERIPHERAL     Culture Result       No Growth  Note: Blood cultures are incubated for 5 days and  are monitored continuously.Positive blood cultures  are called to the RN and reported as soon  as  they are identified.     BLOOD CULTURE    Collection Time: 09/26/24  1:09 AM    Specimen: Peripheral; Blood   Result Value Ref Range    Significant Indicator NEG     Source BLD     Site PERIPHERAL     Culture Result       No Growth  Note: Blood cultures are incubated for 5 days and  are monitored continuously.Positive blood cultures  are called to the RN and reported as soon as  they are identified.     LIPASE    Collection Time: 09/26/24  1:09 AM   Result Value Ref Range    Lipase 2178 (H) 11 - 82 U/L   ESTIMATED GFR    Collection Time: 09/26/24  1:09 AM   Result Value Ref Range    GFR (CKD-EPI) 65 >60 mL/min/1.73 m 2         Imaging:  [id number]  Narrative: 9/26/2024 8:02 AM    HISTORY/REASON FOR EXAM:  possible choledocholithiasis.  Dilated common bile duct is normal in both 1    The lung, he is resultant upper GI all the Z VALADEZ is a greater degree    TECHNIQUE/EXAM DESCRIPTION: Magnetic resonance cholangiopancreatography.    Magnetic resonance cholangiopancreatography was performed on with axial, coronal, and sagittal thin and thick section heavily T2-weighted sequences.    3-D cholangiographic multiplanar maximum intensity projection (MIP) images were created on a workstation..    The study was performed on a Emair Signa 1.5 Angelica MRI scanner.    COMPARISON: Outside ultrasound 9/25/2024    FINDINGS:  LIVER: There is hepatomegaly with craniocaudal length of 18.3 cm. No focal abnormality within the liver.    Gallbladder and biliary system:Gallbladder is mildly dilated. Common bile duct measures 10 mm. However, there no common bile duct stones.    SPLEEN: The spleen is normal in appearance.    PANCREAS: Pancreas is normal in appearance.    Pancreatic ductal anatomy : Within normal limits.    ADRENALS: Both adrenal glands are normal in appearance.    KIDNEYS: Both kidneys are normal in appearance.    The visualized bowel and retroperitoneum are within normal limits.    Osseous structures: There are  postoperative changes at L4-5  Impression: 1.  Negative for common bile duct stones    2.  Biliary dilation with common bile duct measuring 10 mm    3.  Hepatomegaly    4.  Postoperative changes at L4-5        MDM Data Review:  -Records reviewed and summarized in current documentation  -I personally reviewed and interpreted the laboratory results  -I personally reviewed the radiology images  -I have personally reviewed medications    Hospital Problem List:  Active Hospital Problems    Diagnosis     Pancreatitis, unspecified pancreatitis type [K85.90]     Transaminitis [R74.01]     ACP (advance care planning) [Z71.89]     Hypothyroid [E03.9]     HLD (hyperlipidemia) [E78.5]        Impressions:  79-year-old female who presents with epigastric pain.  She has been diagnosed with pancreatitis.  Her biochemical liver tests would support this being gallstone pancreatitis.  MRCP reassuring.      Recommendations:  Trend biochemical liver test  Should biochemical liver tests trend towards normal this would support a diagnosis of gallstone pancreatitis and I would recommend cholecystectomy with intraoperative cholangiogram prior to discharge.  Should biochemical liver test fail to normalize further workup should be pursued for alternative etiology.  We will continue to round on the patient.

## 2024-09-26 NOTE — PROGRESS NOTES
Lab called with critical result of AST at 1537. Critical lab result read back to lab.   Dr. Aburto notified of critical lab result at 0400.  Critical lab result read back by Dr. Aburto.

## 2024-09-26 NOTE — PROGRESS NOTES
4 Eyes Skin Assessment Completed by JESUS Perkins and JESUS Acosta.    Head WDL  Ears WDL  Nose WDL  Mouth WDL  Neck WDL  Breast/Chest WDL  Shoulder Blades WDL  Spine WDL  (R) Arm/Elbow/Hand WDL  (L) Arm/Elbow/Hand WDL  Abdomen old healed scar  Groin WDL  Scrotum/Coccyx/Buttocks WDL  (R) Leg Edema  (L) Leg Edema  (R) Heel/Foot/Toe Redness and Blanching  (L) Heel/Foot/Toe Redness and Blanching          Devices In Places: PIV      Interventions In Place Pillows    Possible Skin Injury No    Pictures Uploaded Into Epic N/A  Wound Consult Placed N/A  RN Wound Prevention Protocol Ordered No

## 2024-09-26 NOTE — THERAPY
Physical Therapy   Initial Evaluation     Patient Name: Rubi Haq  Age:  79 y.o., Sex:  female  Medical Record #: [id number]  Today's Date: 9/26/2024          Assessment  Patient is 79 y.o. female admitted w/ abdominal pain and N&V.  Found to have a distended gallbladder w/ small stones.  She lives alone in a multi level house, where she was indep w/ mobility w/ a cane.  She denies any issues w/ stairs.  Today, she is able to mobilize at what appears to be at or close to her PLOF, able to ambulate 200 ft w/ her own cane.  No acute PT needs.  PT for eval only.  Plan    Physical Therapy Initial Treatment Plan   Duration: Evaluation only    DC Equipment Recommendations: None  Discharge Recommendations: Anticipate that the patient will have no further physical therapy needs after discharge from the hospital       Objective       09/26/24 0918   Prior Living Situation   Housing / Facility 3 Story Apartment / Condo   Steps Into Home 3   Steps In Home   (full flight)   Equipment Owned Single Point Cane   Lives with - Patient's Self Care Capacity Alone and Able to Care For Self   Prior Level of Functional Mobility   Bed Mobility Independent   Transfer Status Independent   Ambulation Independent   Assistive Devices Used Single Point Cane   Stairs Independent   Strength Lower Body   Comments grossly wnl   Balance Assessment   Sitting Balance (Static) Fair +   Sitting Balance (Dynamic) Fair +   Standing Balance (Static) Fair   Standing Balance (Dynamic) Fair   Weight Shift Sitting Good   Weight Shift Standing Good   Comments w/ cane   Bed Mobility    Supine to Sit Supervised   Sit to Supine Supervised   Gait Analysis   Gait Level Of Assist Supervised   Assistive Device Single Point Cane   Distance (Feet) 200   Functional Mobility   Sit to Stand Supervised   Physical Therapy Initial Treatment Plan    Duration Evaluation only   Anticipated Discharge Equipment and Recommendations   DC Equipment Recommendations None    Discharge Recommendations Anticipate that the patient will have no further physical therapy needs after discharge from the hospital

## 2024-09-26 NOTE — ANTIMICROBIAL STEWARDSHIP
Antimicrobial Stewardship Rounds Note    Date  9/26/2024    Assessment  Patient chart reviewed during antimicrobial stewardship rounds with antimicrobial stewardship medical director Dr. Paul Watson.     The patient presented with epigastric pain, subjective fever and chills, nausea, and vomiting. RUQ at the outside facility showed a distended gallbladder without wall thickening. MRCP at [facility] demonstrated no evidence of stone in CBD but did show dilation. Her AST, ALT, and ALP are significantly elevated, and lipase was reported as 12,000 at outside facility and 2000 here, indicative of obstruction. After rounds were completed, lab notified of gram negative rods growing in blood cultures. Source is most likely gall bladder. Common infectious organisms in gall bladder infections include E. Coli and klebsiella, anaerobes are rarely implicated.     Recommendation  Based on the assessment above, the antimicrobial stewardship team recommends de-escalating Zosyn to Ceftriaxone 2g daily to complete 7 days with consideration for oral step-down pending susceptibilities and clinical course, now that she has gram negative rods in blood. Discussed with Dr. Martinez, who agreed. Orders were updated in the chart by this pharmacist.     Enid Thao, PharmD  Title: PGY1 Pharmacy Resident

## 2024-09-26 NOTE — CONSULTS
CHIEF COMPLAINT: abnormal liver transaminases     HISTORY OF PRESENT ILLNESS: The patient is an 79 year-old White woman who presents to the Emergency Department a 3-day history of moderate right subcostal abdominal pain. The pain is associated with nausea and vomiting.  The patient denies any recent or intercurrent illness. The patient has undergone exporatory celiotomy with SB resection 11/23, hysterectomy,.    PAST MEDICAL HISTORY:  has a past medical history of Arthritis, Cancer (HCC) (2011), Cancer (HCC), Disorder of thyroid, Encephalopathy (11/5/2023), High cholesterol, and Pain.    PAST SURGICAL HISTORY:  has a past surgical history that includes other orthopedic surgery (2002); other orthopedic surgery (2007); other orthopedic surgery (2007); other orthopedic surgery (2010); other orthopedic surgery (2010); gyn surgery (1983); gyn surgery (2011); other (2010); other (2014); other (2014); other (2015); other (2018); fusion, spine, lumbar, plif (6/10/2019); lumbar laminectomy diskectomy (6/10/2019); pr exploratory of abdomen (11/5/2023); pr exploratory of abdomen (N/A, 11/7/2023); wound closure neuro (N/A, 11/7/2023); and bowel resection (11/7/2023).    ALLERGIES:   Allergies   Allergen Reactions    Nickel Rash     Rash from thierno    Tramadol      With drawl like symptom after stopped taking       CURRENT MEDICATIONS:    Home Medications       Reviewed by Petra Chew R.N. (Registered Nurse) on 09/26/24 at 0128  Med List Status: Complete     Medication Last Dose Status   acetaminophen (TYLENOL) 500 MG Tab 9/25/2024 Active   acyclovir (ZOVIRAX) 200 MG Cap 09/25/2023 Active   atorvastatin (LIPITOR) 20 MG Tab 9/25/2024 Active   Cholecalciferol (VITAMIN D3) 2000 UNIT Cap 9/25/2024 Active   ferrous sulfate 325 (65 Fe) MG tablet 9/25/2024 Active   gabapentin (NEURONTIN) 300 MG Cap 9/25/2024 Active   levothyroxine (SYNTHROID) 25 MCG Tab 9/25/2024 Active   naproxen (NAPROSYN) 500 MG Tab  Active  "  therapeutic multivitamin-minerals (THERAGRAN-M) Tab 9/25/2024 Active                    FAMILY HISTORY: family history is not on file.    SOCIAL HISTORY:  reports that she has never smoked. She has never used smokeless tobacco. She reports current alcohol use. She reports that she does not use drugs.    REVIEW OF SYSTEMS: Comprehensive review of systems is negative with the exception of the aforementioned HPI, PMH, and PSH bullets in accordance with CMS guidelines.    PHYSICAL EXAMINATION:      Constitutional:     Vital Signs: /72   Pulse 67   Temp 36.3 °C (97.3 °F) (Temporal)   Resp 18   Ht 1.626 m (5' 4\")   Wt 57.4 kg (126 lb 8.7 oz)   SpO2 94%    General Appearance: appears stated age.  HEENT: The pupils are equal, round, and reactive to light bilaterally. The extraocular muscles are intact bilaterally. The sclera are anicteric. Nares and oropharynx are clear.   Neck: Supple.    Respiratory:   Inspection: Unlabored respirations, no intercostal retractions, paradoxical motion, or accessory muscle use.     Cardiovascular:   Inspection: The skin is warm.     Abdomen:  Inspection: Abdominal inspection reveals  palpable gallbladder in RUQ .   Palpation: Palpation is remarkable for moderate tenderness in the right upper quadrant  region.    Extremities:   Examination of the upper and lower extremities demonstrates no cyanosis edema or clubbing.  Neurologic:     No focal deficits noted.    LABORATORY VALUES:   Recent Labs     09/26/24  0109   WBC 9.0   RBC 3.20*   HEMOGLOBIN 9.9*   HEMATOCRIT 31.1*   MCV 97.2   MCH 30.9   MCHC 31.8*   RDW 45.7   PLATELETCT 213   MPV 9.7     Recent Labs     09/26/24  0109   SODIUM 141   POTASSIUM 3.6   CHLORIDE 103   CO2 23   GLUCOSE 109*   BUN 22   CREATININE 0.89   CALCIUM 9.1     Recent Labs     09/26/24  0109   ASTSGOT 1537*   ALTSGPT 989*   TBILIRUBIN 1.0   ALKPHOSPHAT 872*   GLOBULIN 2.5            IMAGING:   OUTSIDE IMAGES-US ABDOMEN   Final Result    "   [id number]    (Results Pending)       ASSESSMENT AND PLAN:     * Transaminitis  Assessment & Plan  Elevated LFTS with dilated GB without cholelithiasis  CT shows dilated GB  MRCP pending  Will follow           DISPOSITION: Medical evaluation and admission. The patient was admitted to the Medical Service prior to surgical consultation. [facility] will follow.     ____________________________________     Rachele Mclaughlin M.D.    DD: 9/26/2024  6:10 AM    [facility] Guidelines for Acute Cholecystitis 2018  AAST Grading System for EGS Conditions  ACS NSQIP Surgical Risk Calculator

## 2024-09-26 NOTE — CARE PLAN
Problem: Knowledge Deficit - Standard  Goal: Patient and family/care givers will demonstrate understanding of plan of care, disease process/condition, diagnostic tests and medications  Outcome: Progressing  Note: Patient has been seen by surgery and made aware of potential further intervention for her gallbladder r/t pancreatitis     Problem: Pain - Standard  Goal: Alleviation of pain or a reduction in pain to the patient’s comfort goal  Outcome: Progressing  Note: Patient's pain has been controlled with use of prn pain meds this shift to permit independence in ADLs and sleep when needed   The patient is Stable - Low risk of patient condition declining or worsening    Shift Goals  Clinical Goals: MRI imaging, IV abx,  Patient Goals: rest, imaging, IV abx  Family Goals: jessie    Progress made toward(s) clinical / shift goals:  progressing     Patient is not progressing towards the following goals:

## 2024-09-26 NOTE — HOSPITAL COURSE
Rubi Haq is an 79-year-old female with PMHx lumbar fusion, discectomy, bowel resection 2023, HLD and hypothyroidism.  Admitted 9/26 for choledocholithiasis.    RUQ US: Distended gallbladder with positive Ornelas sign. CT A/P: Distended gallbladder with questionable gallbladder wall thickening and dilation of the CBD.    Patient additionally had a lipase of greater than 2000-indicating gallstone pancreatitis.  Her initial LFTs were significantly elevated though her bilirubin remained normal throughout the duration of her hospitalization.  LFTs were downtrending at time of discharge.    General surgery has been consulted. 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  Case was discussed with general surgery and colleagues-and recommended watchful waiting in terms of cholecystectomy.  Should patient have a repeat episode of choledocholithiasis, she would need a general surgery consult for possible cholecystectomy.    At time of discharge patient's LFTs and lipase have trended down.  She is tolerating a GI soft diet without return of pain.  She will discharge home with her son to [facility].  She should have close outpatient follow-up moving forward.  I would recommend a repeat CMP in approximately 1 week to ensure that her LFTs continue to downtrend.

## 2024-09-26 NOTE — PROGRESS NOTES
Rubi Haq is an 79-year-old female with PMHx lumbar fusion, discectomy, bowel resection 2023, HLD and hypothyroidism.  Admitted 9/26 for choledocholithiasis.    RUQ US: Distended gallbladder with positive Ornelas sign. CT A/P: Distended gallbladder with questionable gallbladder wall thickening and dilation of the CBD.    General surgery has been consulted.  Patient is NPO.    Problem list:  Transaminitis  Pancreatitis  Anemia   Bacteremia    Plan:  -Trial clear liquid diet  - Consider HIDA scan if unable to tolerate p.o. intake  - Discussed with GI, Dr. Clement  - Discussed with Dr. Thomas, general surgery  - Repeat CMP in a.m.  - N.p.o. at midnight for possible I&D and/or surgical interventions  - Blood cultures positive for gram-negative rods; discussed with infectious disease-continue IV Rocephin at this time    Enid Martinez MD

## 2024-09-26 NOTE — ASSESSMENT & PLAN NOTE
Likely secondary from passed choledocholithiasis  Lipase greater than 2000 on admission; improved to 135   - Advance to GI soft diet   - Continue to monitor  - Continue p.o. oxycodone for mild to moderate pain  - Continue IV Dilaudid for severe breakthrough pain; patient has not required IV dosing in the last 24 hours

## 2024-09-26 NOTE — DIETARY
"Nutrition Services: Initial Assessment     Day 0 of admit. Rubi Haq is a 79 y.o. female with admitting DX of Pancreatitis, unspecified pancreatitis type.     Consult received for MST score >/= 2.    Hospital problem list:  Principal Problem:    Transaminitis (POA: Unknown)  Active Problems:    Hypothyroid (POA: Yes)    HLD (hyperlipidemia) (POA: Yes)    Pancreatitis, unspecified pancreatitis type (POA: Yes)    ACP (advance care planning) (POA: Unknown)     Nutrition Assessment:      Height: 162.6 cm (5' 4\")  Weight: 57.4 kg (126 lb 8.7 oz)  Weight taken via bed scale  Body mass index is 21.72 kg/m². BMI classification: Normal.  Wt Readings from Last 6 Encounters:   09/26/24 57.4 kg (126 lb 8.7 oz)   11/18/23 53.3 kg (117 lb 8.1 oz)   06/13/22 70.3 kg (155 lb)   06/10/19 70.5 kg (155 lb 6.8 oz)      Objective:  Pertinent labs:   Lab Results   Component Value Date/Time    SODIUM 141 09/26/2024 01:09 AM    POTASSIUM 3.6 09/26/2024 01:09 AM    CO2 23 09/26/2024 01:09 AM    CHLORIDE 103 09/26/2024 01:09 AM    BUN 22 09/26/2024 01:09 AM    CREATININE 0.89 09/26/2024 01:09 AM    CREATININE 0.7 02/25/2007 04:26 AM    CALCIUM 9.1 09/26/2024 01:09 AM    PHOSPHORUS 2.4 (L) 11/13/2023 05:58 AM    MAGNESIUM 1.4 (L) 11/13/2023 05:58 AM    GLUCOSE 109 (H) 09/26/2024 01:09 AM      Pertinent meds:   Scheduled Medications   Medication Dose Frequency    levothyroxine  25 mcg AM ES    gabapentin  300 mg 4X/DAY ACHS    cefTRIAXone (ROCEPHIN) IV  2,000 mg Q24HRS      Skin/wounds: No skin injury per RN skin assessment   Food Allergies: No known food allergies.  Last BM: 09/26/24 per flowsheets     Current diet order:   Clear liquids    Subjective:   Patient reported UBW: 125 lb. Pt states she weighed about 145 lb a few years ago before her  passed. She has gradually lost weight, but recently has been stable at about 125 lb.   Discussed NPO/Liquid diet with diet advancement to be per MD. Pt stated " understanding.    Nutrition Focused Physical Exam (NFPE)   Weight loss: No recent weight loss per chart review. Weight has increased since last November. Current weight is consistent with stated current usual weight..  Muscle mass: No significant loss noted.  Subcutaneous fat: No significant loss noted.  Fluid Accumulation: none noted.  Reduced  Strength: N/A in acute care setting.     Nutrition Diagnosis:      Inadequate oral intake related to pancreatitis as evidenced by pt on clear liquid diet.    Based on RD assessment at this time, pt does not meet criteria in congruence with ASPEN/Academy guidelines for malnutrition.     Nutrition Interventions:      Diet advancement per MD.  Encourage PO intake >50% of meals.  Patient aware of active plan of care as appropriate.     Nutrition Monitoring and Evaluation:     RD to monitor per department policy.

## 2024-09-26 NOTE — ASSESSMENT & PLAN NOTE
16 minutes spent discussing goals of care with patient.  She had never discussed CODE STATUS in the past.  I explained to her the processes of chest compressions and intubation.  She states that she would like to be DNR/DNI, okay for medical treatment.

## 2024-09-27 PROBLEM — R78.81 BACTEREMIA: Status: ACTIVE | Noted: 2024-09-27

## 2024-09-27 PROBLEM — Z86.718 HISTORY OF DVT (DEEP VEIN THROMBOSIS): Status: ACTIVE | Noted: 2023-11-05

## 2024-09-27 LAB
ALBUMIN SERPL BCP-MCNC: 2.8 G/DL (ref 3.2–4.9)
ALBUMIN/GLOB SERPL: 1.1 G/DL
ALP SERPL-CCNC: 620 U/L (ref 30–99)
ALT SERPL-CCNC: 524 U/L (ref 2–50)
ANION GAP SERPL CALC-SCNC: 12 MMOL/L (ref 7–16)
AST SERPL-CCNC: 395 U/L (ref 12–45)
BILIRUB SERPL-MCNC: 0.7 MG/DL (ref 0.1–1.5)
BUN SERPL-MCNC: 14 MG/DL (ref 8–22)
CALCIUM ALBUM COR SERPL-MCNC: 9.5 MG/DL (ref 8.5–10.5)
CALCIUM SERPL-MCNC: 8.5 MG/DL (ref 8.5–10.5)
CHLORIDE SERPL-SCNC: 107 MMOL/L (ref 96–112)
CO2 SERPL-SCNC: 21 MMOL/L (ref 20–33)
CREAT SERPL-MCNC: 0.84 MG/DL (ref 0.5–1.4)
ERYTHROCYTE [DISTWIDTH] IN BLOOD BY AUTOMATED COUNT: 46.4 FL (ref 35.9–50)
GFR SERPLBLD CREATININE-BSD FMLA CKD-EPI: 70 ML/MIN/1.73 M 2
GLOBULIN SER CALC-MCNC: 2.5 G/DL (ref 1.9–3.5)
GLUCOSE SERPL-MCNC: 94 MG/DL (ref 65–99)
HCT VFR BLD AUTO: 26.9 % (ref 37–47)
HGB BLD-MCNC: 8.6 G/DL (ref 12–16)
MCH RBC QN AUTO: 30.8 PG (ref 27–33)
MCHC RBC AUTO-ENTMCNC: 32 G/DL (ref 32.2–35.5)
MCV RBC AUTO: 96.4 FL (ref 81.4–97.8)
PLATELET # BLD AUTO: 170 K/UL (ref 164–446)
PMV BLD AUTO: 9.6 FL (ref 9–12.9)
POTASSIUM SERPL-SCNC: 3.5 MMOL/L (ref 3.6–5.5)
PROT SERPL-MCNC: 5.3 G/DL (ref 6–8.2)
RBC # BLD AUTO: 2.79 M/UL (ref 4.2–5.4)
SODIUM SERPL-SCNC: 140 MMOL/L (ref 135–145)
WBC # BLD AUTO: 6.1 K/UL (ref 4.8–10.8)

## 2024-09-27 PROCEDURE — 36415 COLL VENOUS BLD VENIPUNCTURE: CPT

## 2024-09-27 PROCEDURE — 99232 SBSQ HOSP IP/OBS MODERATE 35: CPT | Performed by: STUDENT IN AN ORGANIZED HEALTH CARE EDUCATION/TRAINING PROGRAM

## 2024-09-27 PROCEDURE — 700102 HCHG RX REV CODE 250 W/ 637 OVERRIDE(OP): Performed by: STUDENT IN AN ORGANIZED HEALTH CARE EDUCATION/TRAINING PROGRAM

## 2024-09-27 PROCEDURE — 700111 HCHG RX REV CODE 636 W/ 250 OVERRIDE (IP): Performed by: STUDENT IN AN ORGANIZED HEALTH CARE EDUCATION/TRAINING PROGRAM

## 2024-09-27 PROCEDURE — 80053 COMPREHEN METABOLIC PANEL: CPT

## 2024-09-27 PROCEDURE — A9270 NON-COVERED ITEM OR SERVICE: HCPCS | Performed by: STUDENT IN AN ORGANIZED HEALTH CARE EDUCATION/TRAINING PROGRAM

## 2024-09-27 PROCEDURE — 85027 COMPLETE CBC AUTOMATED: CPT

## 2024-09-27 PROCEDURE — 700105 HCHG RX REV CODE 258: Performed by: STUDENT IN AN ORGANIZED HEALTH CARE EDUCATION/TRAINING PROGRAM

## 2024-09-27 PROCEDURE — 770006 HCHG ROOM/CARE - MED/SURG/GYN SEMI*

## 2024-09-27 PROCEDURE — 99232 SBSQ HOSP IP/OBS MODERATE 35: CPT | Performed by: NURSE PRACTITIONER

## 2024-09-27 PROCEDURE — 99233 SBSQ HOSP IP/OBS HIGH 50: CPT | Performed by: STUDENT IN AN ORGANIZED HEALTH CARE EDUCATION/TRAINING PROGRAM

## 2024-09-27 PROCEDURE — 700101 HCHG RX REV CODE 250: Performed by: STUDENT IN AN ORGANIZED HEALTH CARE EDUCATION/TRAINING PROGRAM

## 2024-09-27 RX ORDER — OXYCODONE HYDROCHLORIDE 5 MG/1
5 TABLET ORAL EVERY 4 HOURS PRN
Status: DISCONTINUED | OUTPATIENT
Start: 2024-09-27 | End: 2024-09-29 | Stop reason: HOSPADM

## 2024-09-27 RX ADMIN — GABAPENTIN 300 MG: 300 CAPSULE ORAL at 04:07

## 2024-09-27 RX ADMIN — SODIUM CHLORIDE, POTASSIUM CHLORIDE, SODIUM LACTATE AND CALCIUM CHLORIDE: 600; 310; 30; 20 INJECTION, SOLUTION INTRAVENOUS at 20:39

## 2024-09-27 RX ADMIN — CEFTRIAXONE SODIUM 2000 MG: 10 INJECTION, POWDER, FOR SOLUTION INTRAVENOUS at 16:07

## 2024-09-27 RX ADMIN — LEVOTHYROXINE SODIUM 25 MCG: 0.03 TABLET ORAL at 04:03

## 2024-09-27 RX ADMIN — OXYCODONE 5 MG: 5 TABLET ORAL at 10:13

## 2024-09-27 RX ADMIN — GABAPENTIN 300 MG: 300 CAPSULE ORAL at 20:36

## 2024-09-27 RX ADMIN — GABAPENTIN 300 MG: 300 CAPSULE ORAL at 11:36

## 2024-09-27 RX ADMIN — GABAPENTIN 300 MG: 300 CAPSULE ORAL at 16:08

## 2024-09-27 RX ADMIN — OXYCODONE 5 MG: 5 TABLET ORAL at 20:37

## 2024-09-27 ASSESSMENT — ENCOUNTER SYMPTOMS
ABDOMINAL PAIN: 1
BLURRED VISION: 0
DEPRESSION: 0
SHORTNESS OF BREATH: 0
CHILLS: 0
DIARRHEA: 0
DIZZINESS: 0
HEARTBURN: 0
WEAKNESS: 0
NAUSEA: 0
VOMITING: 0
COUGH: 0
CONSTIPATION: 0
BLOOD IN STOOL: 0
BACK PAIN: 0
FEVER: 0
NAUSEA: 1

## 2024-09-27 ASSESSMENT — PAIN DESCRIPTION - PAIN TYPE
TYPE: ACUTE PAIN

## 2024-09-27 ASSESSMENT — PATIENT HEALTH QUESTIONNAIRE - PHQ9
2. FEELING DOWN, DEPRESSED, IRRITABLE, OR HOPELESS: NOT AT ALL
SUM OF ALL RESPONSES TO PHQ9 QUESTIONS 1 AND 2: 0
1. LITTLE INTEREST OR PLEASURE IN DOING THINGS: NOT AT ALL

## 2024-09-27 NOTE — PROGRESS NOTES
Hospital Medicine Daily Progress Note    Date of Service  9/27/2024    Chief Complaint  Rubi Haq is a 79 y.o. female admitted 9/26/2024 with abdominal pain.    Hospital Course  Rubi Haq is an 79-year-old female with PMHx lumbar fusion, discectomy, bowel resection 2023, HLD and hypothyroidism.  Admitted 9/26 for choledocholithiasis.    RUQ US: Distended gallbladder with positive Ornelas sign. CT A/P: Distended gallbladder with questionable gallbladder wall thickening and dilation of the CBD.    General surgery has been consulted.  Patient is NPO.    Interval Problem Update  9/27: Vital stable overnight.  WBC 6.1 from 9.  Hb 8.6 from 9.9.  Significant improvement in LFTs overnight.  Bilirubin 0.7 from 1.  Lipase 135 from 2178.  Continue clear liquid diet at this time.  No cholecystectomy planned at this time.  Close monitoring for return of pain and elevation in LFTs.    I have discussed this patient's plan of care and discharge plan at IDT rounds today with Case Management, Nursing, Nursing leadership, and other members of the IDT team.    Consultants/Specialty  general surgery and GI    Code Status  DNAR/DNI    Disposition  The patient is not medically cleared for discharge to home or a post-acute facility.      I have placed the appropriate orders for post-discharge needs.    Review of Systems  Review of Systems   Constitutional:  Negative for fever and malaise/fatigue.   Respiratory:  Negative for shortness of breath.    Cardiovascular:  Negative for chest pain and leg swelling.   Gastrointestinal:  Positive for abdominal pain and nausea. Negative for vomiting.        Physical Exam  Temp:  [36.1 °C (96.9 °F)-36.6 °C (97.8 °F)] 36.6 °C (97.8 °F)  Pulse:  [66-85] 66  Resp:  [16-18] 16  BP: (111-131)/(60-69) 131/69  SpO2:  [90 %-95 %] 95 %    Physical Exam  Vitals and nursing note reviewed.   Constitutional:       General: She is not in acute distress.     Appearance: Normal appearance. She is not  ill-appearing.   Cardiovascular:      Rate and Rhythm: Normal rate and regular rhythm.   Pulmonary:      Effort: Pulmonary effort is normal.      Breath sounds: Normal breath sounds.   Abdominal:      General: Abdomen is flat. There is no distension.      Palpations: Abdomen is soft.      Tenderness: There is abdominal tenderness.   Skin:     General: Skin is warm and dry.   Neurological:      Mental Status: She is alert and oriented to person, place, and time.   Psychiatric:         Mood and Affect: Mood normal.         Behavior: Behavior normal.         Fluids    Intake/Output Summary (Last 24 hours) at 9/27/2024 1551  Last data filed at 9/27/2024 0731  Gross per 24 hour   Intake 1721.97 ml   Output --   Net 1721.97 ml        Laboratory  Recent Labs     09/26/24  0109 09/27/24  0044   WBC 9.0 6.1   RBC 3.20* 2.79*   HEMOGLOBIN 9.9* 8.6*   HEMATOCRIT 31.1* 26.9*   MCV 97.2 96.4   MCH 30.9 30.8   MCHC 31.8* 32.0*   RDW 45.7 46.4   PLATELETCT 213 170   MPV 9.7 9.6     Recent Labs     09/26/24  0109 09/27/24  0044   SODIUM 141 140   POTASSIUM 3.6 3.5*   CHLORIDE 103 107   CO2 23 21   GLUCOSE 109* 94   BUN 22 14   CREATININE 0.89 0.84   CALCIUM 9.1 8.5                   Imaging  [id number]   Final Result      1.  Negative for common bile duct stones      2.  Biliary dilation with common bile duct measuring 10 mm      3.  Hepatomegaly      4.  Postoperative changes at L4-5      OUTSIDE IMAGES-US ABDOMEN   Final Result           Assessment/Plan  * Transaminitis  Assessment & Plan  Significant elevation of LFTs on admission.  Secondary to choledocholithiasis.  MRCP: Negative for common bile duct stones.  Biliary dilation with CBD measuring 10 mm.  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  - Continue clear liquid diet; do not advance to GI  soft at this time  - Close monitoring for return of pain or new increase in transaminitis  - Continue IV Zosyn  - Continue conservative management    Bacteremia  Assessment & Plan  Blood cultures from 9/26 positive for Klebsiella  - Discussed with ID pharmacy  - Continue IV ceftriaxone at this time  - Continue to follow sensitivities; will need a total of 7-day course if source control can be confirmed    ACP (advance care planning)  Assessment & Plan  16 minutes spent discussing goals of care with patient.  She had never discussed CODE STATUS in the past.  I explained to her the processes of chest compressions and intubation.  She states that she would like to be DNR/DNI, okay for medical treatment.    Pancreatitis, unspecified pancreatitis type- (present on admission)  Assessment & Plan  Likely secondary from passed choledocholithiasis  Lipase greater than 2000 on admission; improved to 135 today  - Continue gentle clear liquid diet  - Continue to monitor  - Start p.o. oxycodone for mild to moderate pain  - Continue IV Dilaudid for severe breakthrough pain; patient has not required IV dosing in the last 24 hours    Anemia- (present on admission)  Assessment & Plan  Hb 8.6.  Baseline appears to between 7 and 9  - Repeat CBC in a.m.    History of DVT (deep vein thrombosis)- (present on admission)  Assessment & Plan  Patient has a history of DVT.  Holding DVT prophylaxis given anemia and surgical planning  - Close monitoring for lower extremity swelling  - Encourage ambulation as able  - SCDS    HLD (hyperlipidemia)- (present on admission)  Assessment & Plan  Holding home atorvastatin due to transaminitis    Hypothyroid- (present on admission)  Assessment & Plan  TSH 4.9  - Continue levothyroxine       My total time spent caring for the patient on the day of the encounter was 57 minutes.   This does not include time spent on separately billable procedures/tests.      VTE prophylaxis:   SCDs/TEDs   pharmacologic  prophylaxis contraindicated due to possible surgical planning and anemia      I have performed a physical exam and reviewed and updated ROS and Plan today (9/27/2024). In review of yesterday's note (9/26/2024), there are no changes except as documented above.

## 2024-09-27 NOTE — PROGRESS NOTES
Assumed care of pt at 0700. Report received and bedside rounding completed with night RN. Pt is calm, sleeping, no SOB, no acute distress noted.    Call light and pt belongings within reach - hourly rounding in place. See flowsheets for assessment.

## 2024-09-27 NOTE — CARE PLAN
The patient is Stable - Low risk of patient condition declining or worsening    Shift Goals  Clinical Goals: Pt will have pain managed at 3/10 or less throughout the end of my shift  Patient Goals: Pain management  Family Goals: AMELIA    Progress made toward(s) clinical / shift goals:    Problem: Knowledge Deficit - Standard  Goal: Patient and family/care givers will demonstrate understanding of plan of care, disease process/condition, diagnostic tests and medications  Outcome: Progressing  Note: Pt verbalizes understanding of POC     Problem: Fall Risk  Goal: Patient will remain free from falls  Outcome: Progressing  Note: All proper fall precautions in place, pt scores as low fall risk      Problem: Pain - Standard  Goal: Alleviation of pain or a reduction in pain to the patient’s comfort goal  Outcome: Progressing  Note: Pt calls for pain management appropriately and medicated per MAR       Patient is not progressing towards the following goals:

## 2024-09-27 NOTE — ASSESSMENT & PLAN NOTE
Patient has a history of DVT.  Holding DVT prophylaxis given anemia and surgical planning  - Close monitoring for lower extremity swelling  - Encourage ambulation as able  - SCDS

## 2024-09-27 NOTE — ASSESSMENT & PLAN NOTE
Blood cultures from 9/26 positive for Klebsiella  - Discussed with ID pharmacy  - Continue IV ceftriaxone at this time  - Continue to follow sensitivities; will need a total of 7-day course if source control can be confirmed

## 2024-09-27 NOTE — PROGRESS NOTES
..Gastroenterology Progress Note               Author:  Camryn Ramey, DEVANTE,  APRN Date & Time Created: 9/27/2024 8:27 AM       Patient ID:  Name:             Rubi Haq  YOB: 1945  Age:                 79 y.o.  female  MRN:               [id number]        Medical Decision Making, by Problem:  Active Hospital Problems    Diagnosis     Pancreatitis, unspecified pancreatitis type [K85.90]     Transaminitis [R74.01]     ACP (advance care planning) [Z71.89]     Hypothyroid [E03.9]     HLD (hyperlipidemia) [E78.5]      Presenting Chief Complaint:  Pancreatitis, elevated biochemical liver test, dilated common bile duct     History of Present Illness:   79-year-old female who felt like she had the flu over the past several days.  Having intermittent epigastric distress.  Severe abdominal pain led her to the emergency department.  Patient has been diagnosed with pancreatitis.  Her biochemical liver tests demonstrated severe transaminitis.  Bilirubin is normal.  Patient underwent MRI which demonstrated a mild dilation of the bile duct to 10 mm with no obvious stone.  Surgery has been consulted.  They are contemplating cholecystectomy.    Interval History:  9/27/2024:  Patient seen. Still having some abdominal pain but improving. Tolerating her diet. Has a lot of questions about next steps. Liver enzymes downtrending       Hospital Medications:  Current Facility-Administered Medications   Medication Dose Frequency Provider Last Rate Last Admin    lactated ringers infusion   Continuous Evan Aburto M.D. 100 mL/hr at 09/26/24 2338 New Bag at 09/26/24 2338    ondansetron (Zofran) syringe/vial injection 4 mg  4 mg Q4HRS PRN Evan Aburto M.D.        ondansetron (Zofran ODT) dispertab 4 mg  4 mg Q4HRS PRN Evan Aburto M.D.        labetalol (Normodyne/Trandate) injection 10 mg  10 mg Q4HRS PRPAOLA Aburto M.D.        levothyroxine (Synthroid) tablet 25 mcg  25 mcg AM JIMBO Gordon  "Nelson Aburto M.D.   25 mcg at 09/27/24 0403    gabapentin (Neurontin) capsule 300 mg  300 mg 4X/DAY ACHS Evan Aburto M.D.   300 mg at 09/27/24 0407    HYDROmorphone (Dilaudid) injection 0.5 mg  0.5 mg Q4HRS PRN Evan Aburto M.D.   0.5 mg at 09/26/24 2008    cefTRIAXone (Rocephin) syringe 2,000 mg  2,000 mg Q24HRS Enid Martinez M.D.   2,000 mg at 09/26/24 1628   Last reviewed on 9/26/2024  1:28 AM by Petra Chew R.N.       Review of Systems:  Review of Systems   Constitutional:  Negative for chills, fever and malaise/fatigue.   HENT:  Negative for hearing loss.    Eyes:  Negative for blurred vision.   Respiratory:  Negative for cough and shortness of breath.    Cardiovascular:  Negative for chest pain and leg swelling.   Gastrointestinal:  Positive for abdominal pain. Negative for blood in stool, constipation, diarrhea, heartburn, melena, nausea and vomiting.   Genitourinary:  Negative for dysuria.   Musculoskeletal:  Negative for back pain.   Skin:  Negative for rash.   Neurological:  Negative for dizziness and weakness.   Psychiatric/Behavioral:  Negative for depression.    All other systems reviewed and are negative.        Vital signs:  Weight/BMI: Body mass index is 21.72 kg/m².  /69   Pulse 66   Temp 36.5 °C (97.7 °F) (Temporal)   Resp 16   Ht 1.626 m (5' 4\")   Wt 57.4 kg (126 lb 8.7 oz)   SpO2 95%   Vitals:    09/26/24 2008 09/26/24 2108 09/27/24 0433 09/27/24 0731   BP:   125/61 131/69   Pulse:   77 66   Resp: 18 17 17 16   Temp:   36.3 °C (97.4 °F) 36.5 °C (97.7 °F)   TempSrc:   Temporal Temporal   SpO2:   90% 95%   Weight:       Height:         Oxygen Therapy:  Pulse Oximetry: 95 %, O2 Delivery Device: None - Room Air    Intake/Output Summary (Last 24 hours) at 9/27/2024 0827  Last data filed at 9/27/2024 0731  Gross per 24 hour   Intake 1721.97 ml   Output --   Net 1721.97 ml         Physical Exam  Vitals and nursing note reviewed.   Constitutional:       General: She is " not in acute distress.     Appearance: Normal appearance.   HENT:      Head: Normocephalic and atraumatic.      Right Ear: External ear normal.      Left Ear: External ear normal.      Nose: Nose normal.      Mouth/Throat:      Mouth: Mucous membranes are moist.      Pharynx: Oropharynx is clear.   Eyes:      General: No scleral icterus.  Cardiovascular:      Rate and Rhythm: Normal rate and regular rhythm.      Pulses: Normal pulses.      Heart sounds: Normal heart sounds.   Pulmonary:      Effort: Pulmonary effort is normal. No respiratory distress.      Breath sounds: Normal breath sounds.   Abdominal:      General: Abdomen is flat. Bowel sounds are normal. There is no distension.      Palpations: Abdomen is soft.      Tenderness: There is no abdominal tenderness.      Comments: Midline abdominal scar   Musculoskeletal:         General: Normal range of motion.      Cervical back: Normal range of motion.   Skin:     General: Skin is warm and dry.      Capillary Refill: Capillary refill takes less than 2 seconds.      Coloration: Skin is pale.   Neurological:      Mental Status: She is alert and oriented to person, place, and time.   Psychiatric:         Mood and Affect: Mood normal.         Behavior: Behavior normal.         Labs:  Recent Labs     09/26/24 0109 09/27/24  0044   SODIUM 141 140   POTASSIUM 3.6 3.5*   CHLORIDE 103 107   CO2 23 21   BUN 22 14   CREATININE 0.89 0.84   CALCIUM 9.1 8.5     Recent Labs     09/26/24 0109 09/26/24  1636 09/27/24  0044   ALTSGPT 989*  --  524*   ASTSGOT 1537*  --  395*   ALKPHOSPHAT 872*  --  620*   TBILIRUBIN 1.0  --  0.7   LIPASE 2178* 135*  --    GLUCOSE 109*  --  94     Recent Labs     09/26/24 0109 09/27/24  0044   WBC 9.0 6.1   NEUTSPOLYS 90.90*  --    LYMPHOCYTES 4.00*  --    MONOCYTES 4.80  --    EOSINOPHILS 0.00  --    BASOPHILS 0.10  --    ASTSGOT 1537* 395*   ALTSGPT 989* 524*   ALKPHOSPHAT 872* 620*   TBILIRUBIN 1.0 0.7     Recent Labs     09/26/24 0109  09/27/24  0044   RBC 3.20* 2.79*   HEMOGLOBIN 9.9* 8.6*   HEMATOCRIT 31.1* 26.9*   PLATELETCT 213 170     Recent Results (from the past 24 hour(s))   LIPASE    Collection Time: 09/26/24  4:36 PM   Result Value Ref Range    Lipase 135 (H) 11 - 82 U/L   Comp Metabolic Panel    Collection Time: 09/27/24 12:44 AM   Result Value Ref Range    Sodium 140 135 - 145 mmol/L    Potassium 3.5 (L) 3.6 - 5.5 mmol/L    Chloride 107 96 - 112 mmol/L    Co2 21 20 - 33 mmol/L    Anion Gap 12.0 7.0 - 16.0    Glucose 94 65 - 99 mg/dL    Bun 14 8 - 22 mg/dL    Creatinine 0.84 0.50 - 1.40 mg/dL    Calcium 8.5 8.5 - 10.5 mg/dL    Correct Calcium 9.5 8.5 - 10.5 mg/dL    AST(SGOT) 395 (H) 12 - 45 U/L    ALT(SGPT) 524 (H) 2 - 50 U/L    Alkaline Phosphatase 620 (H) 30 - 99 U/L    Total Bilirubin 0.7 0.1 - 1.5 mg/dL    Albumin 2.8 (L) 3.2 - 4.9 g/dL    Total Protein 5.3 (L) 6.0 - 8.2 g/dL    Globulin 2.5 1.9 - 3.5 g/dL    A-G Ratio 1.1 g/dL   CBC WITHOUT DIFFERENTIAL    Collection Time: 09/27/24 12:44 AM   Result Value Ref Range    WBC 6.1 4.8 - 10.8 K/uL    RBC 2.79 (L) 4.20 - 5.40 M/uL    Hemoglobin 8.6 (L) 12.0 - 16.0 g/dL    Hematocrit 26.9 (L) 37.0 - 47.0 %    MCV 96.4 81.4 - 97.8 fL    MCH 30.8 27.0 - 33.0 pg    MCHC 32.0 (L) 32.2 - 35.5 g/dL    RDW 46.4 35.9 - 50.0 fL    Platelet Count 170 164 - 446 K/uL    MPV 9.6 9.0 - 12.9 fL   ESTIMATED GFR    Collection Time: 09/27/24 12:44 AM   Result Value Ref Range    GFR (CKD-EPI) 70 >60 mL/min/1.73 m 2       Radiology Review:  [id number]   Final Result      1.  Negative for common bile duct stones      2.  Biliary dilation with common bile duct measuring 10 mm      3.  Hepatomegaly      4.  Postoperative changes at L4-5      OUTSIDE IMAGES-US ABDOMEN   Final Result          MDM (Data Review):   -Records reviewed and summarized in current documentation  -I personally reviewed and interpreted the laboratory results  -I personally reviewed the radiology  images    Assessment/Recommendations:  Gallstone pancreatitis  Cholecystitis   HX of bowel resection 2023  Hyperlipidemia   Hypothyroidism  Lumbar fusion    Recommendations:   Liver enzymes downtrending and T. Bili normal  Surgery following, deferring open cholecystectomy at this time as she is high risk for technical complications  No role for GI intervention at this time    GI team will sign off. Please don't hesitate to re consult with any questions or concerns    Discussed with patient, Dr. Martinez, Dr. Scales    ..Camryn Ramey, DNP,  APRN    Core Quality Measures   Reviewed items::  Labs, Medications and Radiology reports reviewed

## 2024-09-27 NOTE — PROGRESS NOTES
Bedside report received from JESUS Rondon. Patient resting in bed. Call bell within reach, bed alarm not required per DAY RN. All belongings within reach for patient. No further needs at this time.

## 2024-09-27 NOTE — PROGRESS NOTES
"  DATE: 9/27/2024        INTERVAL EVENTS:  Right subcostal and epigastric pain markedly improved  Tolerated CLD without worse pain or vomiting.  WBC normal  Transaminases/Alk phos improving  Bilirubin normal  Lipase essentially normal now      PHYSICAL EXAMINATION:  Vital Signs: /69   Pulse 66   Temp 36.5 °C (97.7 °F) (Temporal)   Resp 16   Ht 1.626 m (5' 4\")   Wt 57.4 kg (126 lb 8.7 oz)   SpO2 95%   Physical Exam  Vitals and nursing note reviewed.   Constitutional:       Appearance: Normal appearance.   HENT:      Head: Normocephalic and atraumatic.      Right Ear: External ear normal.      Left Ear: External ear normal.      Nose: Nose normal.      Mouth/Throat:      Mouth: Mucous membranes are moist.      Pharynx: Oropharynx is clear.   Eyes:      General:         Right eye: No discharge.         Left eye: No discharge.      Conjunctiva/sclera: Conjunctivae normal.      Pupils: Pupils are equal, round, and reactive to light.   Cardiovascular:      Rate and Rhythm: Normal rate and regular rhythm.      Pulses: Normal pulses.      Heart sounds: Normal heart sounds.   Pulmonary:      Effort: Pulmonary effort is normal. No respiratory distress.      Breath sounds: Normal breath sounds.   Abdominal:      General: There is no distension.      Tenderness: There is abdominal tenderness (MIld epigastric pain).   Musculoskeletal:         General: No swelling or tenderness.      Cervical back: No rigidity or tenderness.   Skin:     General: Skin is warm and dry.      Capillary Refill: Capillary refill takes less than 2 seconds.   Neurological:      General: No focal deficit present.      Mental Status: She is alert and oriented to person, place, and time.   Psychiatric:         Mood and Affect: Mood normal.         Behavior: Behavior normal.         LABORATORY VALUES:  Recent Labs     09/26/24  0109 09/27/24  0044   WBC 9.0 6.1   RBC 3.20* 2.79*   HEMOGLOBIN 9.9* 8.6*   HEMATOCRIT 31.1* 26.9*   MCV 97.2 96.4 "   MCH 30.9 30.8   MCHC 31.8* 32.0*   RDW 45.7 46.4   PLATELETCT 213 170   MPV 9.7 9.6     Recent Labs     09/26/24  0109 09/27/24  0044   SODIUM 141 140   POTASSIUM 3.6 3.5*   CHLORIDE 103 107   CO2 23 21   GLUCOSE 109* 94   BUN 22 14   CREATININE 0.89 0.84   CALCIUM 9.1 8.5     Recent Labs     09/26/24  0109 09/27/24  0044   ASTSGOT 1537* 395*   ALTSGPT 989* 524*   TBILIRUBIN 1.0 0.7   ALKPHOSPHAT 872* 620*   GLOBULIN 2.5 2.5           IMAGING:  [id number]   Final Result      1.  Negative for common bile duct stones      2.  Biliary dilation with common bile duct measuring 10 mm      3.  Hepatomegaly      4.  Postoperative changes at L4-5      OUTSIDE IMAGES-US ABDOMEN   Final Result          ASSESSMENT AND PLAN:  79 year old woman who presented with epigastric pain for 3 days prior to arrival.  Workup suggests gallstone pancreatitis without signs of acute cholecystitis.  There is no obstructing stone on recent MRCP.  She meets indications to offer same admission cholecystectomy but she is an incredibly high risk for surgical complications due to her prior abdominal catastrophe last year.  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  She had dense intraabdominal adhesions at that time.  This adhesive burden is likely even worse after delayed abdominal closure last year.  She will almost certainly require open cholecystectomy and is at a high risk for technical complications during surgery as well as post-operative complications during the convalescent period.  It seems that the risk:benefit profile would favor not performing prophylactic cholecystectomy.  Will continue to discuss plan with patient and primary team.  For now, continue conservative management of pancreatitis.  Okay to advance to full liquids but would not recommend regular food yet.           ____________________________________     Parmjit  ESTEFANIA Wilder M.D.    DD: 9/27/2024  8:13 AM

## 2024-09-28 LAB
ALBUMIN SERPL BCP-MCNC: 2.9 G/DL (ref 3.2–4.9)
ALBUMIN/GLOB SERPL: 1.2 G/DL
ALP SERPL-CCNC: 688 U/L (ref 30–99)
ALT SERPL-CCNC: 332 U/L (ref 2–50)
ANION GAP SERPL CALC-SCNC: 12 MMOL/L (ref 7–16)
AST SERPL-CCNC: 147 U/L (ref 12–45)
BACTERIA BLD CULT: ABNORMAL
BACTERIA BLD CULT: ABNORMAL
BILIRUB SERPL-MCNC: 0.3 MG/DL (ref 0.1–1.5)
BUN SERPL-MCNC: 8 MG/DL (ref 8–22)
CALCIUM ALBUM COR SERPL-MCNC: 9.5 MG/DL (ref 8.5–10.5)
CALCIUM SERPL-MCNC: 8.6 MG/DL (ref 8.5–10.5)
CHLORIDE SERPL-SCNC: 107 MMOL/L (ref 96–112)
CO2 SERPL-SCNC: 23 MMOL/L (ref 20–33)
CREAT SERPL-MCNC: 0.76 MG/DL (ref 0.5–1.4)
ERYTHROCYTE [DISTWIDTH] IN BLOOD BY AUTOMATED COUNT: 44.9 FL (ref 35.9–50)
GFR SERPLBLD CREATININE-BSD FMLA CKD-EPI: 79 ML/MIN/1.73 M 2
GLOBULIN SER CALC-MCNC: 2.4 G/DL (ref 1.9–3.5)
GLUCOSE SERPL-MCNC: 125 MG/DL (ref 65–99)
HCT VFR BLD AUTO: 26.9 % (ref 37–47)
HGB BLD-MCNC: 8.6 G/DL (ref 12–16)
MAGNESIUM SERPL-MCNC: 1.5 MG/DL (ref 1.5–2.5)
MCH RBC QN AUTO: 31 PG (ref 27–33)
MCHC RBC AUTO-ENTMCNC: 32 G/DL (ref 32.2–35.5)
MCV RBC AUTO: 97.1 FL (ref 81.4–97.8)
PHOSPHATE SERPL-MCNC: 3 MG/DL (ref 2.5–4.5)
PLATELET # BLD AUTO: 191 K/UL (ref 164–446)
PMV BLD AUTO: 10.1 FL (ref 9–12.9)
POTASSIUM SERPL-SCNC: 3.3 MMOL/L (ref 3.6–5.5)
PROT SERPL-MCNC: 5.3 G/DL (ref 6–8.2)
RBC # BLD AUTO: 2.77 M/UL (ref 4.2–5.4)
SIGNIFICANT IND 70042: ABNORMAL
SITE SITE: ABNORMAL
SODIUM SERPL-SCNC: 142 MMOL/L (ref 135–145)
SOURCE SOURCE: ABNORMAL
WBC # BLD AUTO: 6.2 K/UL (ref 4.8–10.8)

## 2024-09-28 PROCEDURE — A9270 NON-COVERED ITEM OR SERVICE: HCPCS | Performed by: STUDENT IN AN ORGANIZED HEALTH CARE EDUCATION/TRAINING PROGRAM

## 2024-09-28 PROCEDURE — 80053 COMPREHEN METABOLIC PANEL: CPT

## 2024-09-28 PROCEDURE — 84100 ASSAY OF PHOSPHORUS: CPT

## 2024-09-28 PROCEDURE — 700111 HCHG RX REV CODE 636 W/ 250 OVERRIDE (IP): Performed by: STUDENT IN AN ORGANIZED HEALTH CARE EDUCATION/TRAINING PROGRAM

## 2024-09-28 PROCEDURE — 700105 HCHG RX REV CODE 258: Performed by: STUDENT IN AN ORGANIZED HEALTH CARE EDUCATION/TRAINING PROGRAM

## 2024-09-28 PROCEDURE — 99232 SBSQ HOSP IP/OBS MODERATE 35: CPT | Performed by: STUDENT IN AN ORGANIZED HEALTH CARE EDUCATION/TRAINING PROGRAM

## 2024-09-28 PROCEDURE — 700101 HCHG RX REV CODE 250: Performed by: STUDENT IN AN ORGANIZED HEALTH CARE EDUCATION/TRAINING PROGRAM

## 2024-09-28 PROCEDURE — 83735 ASSAY OF MAGNESIUM: CPT

## 2024-09-28 PROCEDURE — 770006 HCHG ROOM/CARE - MED/SURG/GYN SEMI*

## 2024-09-28 PROCEDURE — 36415 COLL VENOUS BLD VENIPUNCTURE: CPT

## 2024-09-28 PROCEDURE — 85027 COMPLETE CBC AUTOMATED: CPT

## 2024-09-28 PROCEDURE — 700102 HCHG RX REV CODE 250 W/ 637 OVERRIDE(OP): Performed by: STUDENT IN AN ORGANIZED HEALTH CARE EDUCATION/TRAINING PROGRAM

## 2024-09-28 RX ORDER — POTASSIUM CHLORIDE 1500 MG/1
40 TABLET, EXTENDED RELEASE ORAL ONCE
Status: COMPLETED | OUTPATIENT
Start: 2024-09-28 | End: 2024-09-28

## 2024-09-28 RX ORDER — MAGNESIUM SULFATE HEPTAHYDRATE 40 MG/ML
2 INJECTION, SOLUTION INTRAVENOUS ONCE
Status: COMPLETED | OUTPATIENT
Start: 2024-09-28 | End: 2024-09-28

## 2024-09-28 RX ADMIN — MAGNESIUM SULFATE HEPTAHYDRATE 2 G: 2 INJECTION, SOLUTION INTRAVENOUS at 08:04

## 2024-09-28 RX ADMIN — SODIUM CHLORIDE, POTASSIUM CHLORIDE, SODIUM LACTATE AND CALCIUM CHLORIDE: 600; 310; 30; 20 INJECTION, SOLUTION INTRAVENOUS at 15:12

## 2024-09-28 RX ADMIN — OXYCODONE 5 MG: 5 TABLET ORAL at 15:31

## 2024-09-28 RX ADMIN — OXYCODONE 5 MG: 5 TABLET ORAL at 22:45

## 2024-09-28 RX ADMIN — GABAPENTIN 300 MG: 300 CAPSULE ORAL at 07:57

## 2024-09-28 RX ADMIN — GABAPENTIN 300 MG: 300 CAPSULE ORAL at 16:04

## 2024-09-28 RX ADMIN — GABAPENTIN 300 MG: 300 CAPSULE ORAL at 20:05

## 2024-09-28 RX ADMIN — LEVOTHYROXINE SODIUM 25 MCG: 0.03 TABLET ORAL at 04:40

## 2024-09-28 RX ADMIN — CEFTRIAXONE SODIUM 2000 MG: 10 INJECTION, POWDER, FOR SOLUTION INTRAVENOUS at 16:04

## 2024-09-28 RX ADMIN — GABAPENTIN 300 MG: 300 CAPSULE ORAL at 11:19

## 2024-09-28 RX ADMIN — POTASSIUM CHLORIDE 40 MEQ: 1500 TABLET, EXTENDED RELEASE ORAL at 07:57

## 2024-09-28 ASSESSMENT — PATIENT HEALTH QUESTIONNAIRE - PHQ9
2. FEELING DOWN, DEPRESSED, IRRITABLE, OR HOPELESS: NOT AT ALL
1. LITTLE INTEREST OR PLEASURE IN DOING THINGS: NOT AT ALL
1. LITTLE INTEREST OR PLEASURE IN DOING THINGS: NOT AT ALL
2. FEELING DOWN, DEPRESSED, IRRITABLE, OR HOPELESS: NOT AT ALL
SUM OF ALL RESPONSES TO PHQ9 QUESTIONS 1 AND 2: 0
SUM OF ALL RESPONSES TO PHQ9 QUESTIONS 1 AND 2: 0

## 2024-09-28 ASSESSMENT — PAIN DESCRIPTION - PAIN TYPE
TYPE: ACUTE PAIN

## 2024-09-28 ASSESSMENT — ENCOUNTER SYMPTOMS
VOMITING: 0
SHORTNESS OF BREATH: 0
FEVER: 0
ABDOMINAL PAIN: 1
NAUSEA: 1

## 2024-09-28 NOTE — PROGRESS NOTES
Patient peripheral IV in left forearm infiltrated at 0900, infusion stop and started in patient upper arm peripheral IV. Peripheral IV in lower arm removed.

## 2024-09-28 NOTE — CARE PLAN
The patient is Stable - Low risk of patient condition declining or worsening    Shift Goals  Clinical Goals: Pt will have pain managed at 3/10 or less and recieve IV fluids throughout the end of my shift  Patient Goals: Pain management, Rest  Family Goals: AMELIA    Progress made toward(s) clinical / shift goals:    Problem: Fall Risk  Goal: Patient will remain free from falls  Outcome: Progressing  Note: All proper fall precautions in place, pt scores as a low fall risk     Problem: Pain - Standard  Goal: Alleviation of pain or a reduction in pain to the patient’s comfort goal  Outcome: Progressing  Note: Pt calls for pain management appropriately and verbalizes understanding of non-pharm methods of pain management      Problem: Nutrition  Goal: Patient's nutritional and fluid intake will be adequate or improve  Outcome: Progressing  Note: Pt is able to tolerate clear liquid diet and was advanced to full liquid diet per DAY RN.       Patient is not progressing towards the following goals:

## 2024-09-28 NOTE — PROGRESS NOTES
Bedside report received from JESUS Roy. Patient resting in bed. Call bell within reach, bed alarm not required per DAY RN. All belongings within reach for patient. No further needs at this time.

## 2024-09-28 NOTE — PROGRESS NOTES
"  DATE: 9/28/2024        INTERVAL EVENTS:  Pain essentially resolved  Labs all improving      PHYSICAL EXAMINATION:  Vital Signs: /65   Pulse 73   Temp 36.8 °C (98.3 °F) (Temporal)   Resp 18   Ht 1.626 m (5' 4\")   Wt 57.4 kg (126 lb 8.7 oz)   SpO2 93%   Physical Exam  Vitals and nursing note reviewed.   Constitutional:       Appearance: Normal appearance.   HENT:      Head: Normocephalic and atraumatic.      Right Ear: External ear normal.      Left Ear: External ear normal.      Nose: Nose normal.      Mouth/Throat:      Mouth: Mucous membranes are moist.      Pharynx: Oropharynx is clear.   Eyes:      General:         Right eye: No discharge.         Left eye: No discharge.      Conjunctiva/sclera: Conjunctivae normal.      Pupils: Pupils are equal, round, and reactive to light.   Cardiovascular:      Rate and Rhythm: Normal rate and regular rhythm.      Pulses: Normal pulses.      Heart sounds: Normal heart sounds.   Pulmonary:      Effort: Pulmonary effort is normal. No respiratory distress.      Breath sounds: Normal breath sounds.   Abdominal:      General: There is no distension.      Tenderness: There is no abdominal tenderness (MIld epigastric pain).   Musculoskeletal:         General: No swelling or tenderness.      Cervical back: No rigidity or tenderness.   Skin:     General: Skin is warm and dry.      Capillary Refill: Capillary refill takes less than 2 seconds.   Neurological:      General: No focal deficit present.      Mental Status: She is alert and oriented to person, place, and time.   Psychiatric:         Mood and Affect: Mood normal.         Behavior: Behavior normal.         LABORATORY VALUES:  Recent Labs     09/26/24  0109 09/27/24  0044 09/28/24  0041   WBC 9.0 6.1 6.2   RBC 3.20* 2.79* 2.77*   HEMOGLOBIN 9.9* 8.6* 8.6*   HEMATOCRIT 31.1* 26.9* 26.9*   MCV 97.2 96.4 97.1   MCH 30.9 30.8 31.0   MCHC 31.8* 32.0* 32.0*   RDW 45.7 46.4 44.9   PLATELETCT 213 170 191   MPV 9.7 9.6 10.1 "     Recent Labs     09/26/24  0109 09/27/24  0044 09/28/24  0041   SODIUM 141 140 142   POTASSIUM 3.6 3.5* 3.3*   CHLORIDE 103 107 107   CO2 23 21 23   GLUCOSE 109* 94 125*   BUN 22 14 8   CREATININE 0.89 0.84 0.76   CALCIUM 9.1 8.5 8.6     Recent Labs     09/26/24  0109 09/27/24  0044 09/28/24  0041   ASTSGOT 1537* 395* 147*   ALTSGPT 989* 524* 332*   TBILIRUBIN 1.0 0.7 0.3   ALKPHOSPHAT 872* 620* 688*   GLOBULIN 2.5 2.5 2.4           IMAGING:  [id number]   Final Result      1.  Negative for common bile duct stones      2.  Biliary dilation with common bile duct measuring 10 mm      3.  Hepatomegaly      4.  Postoperative changes at L4-5      OUTSIDE IMAGES-US ABDOMEN   Final Result          ASSESSMENT AND PLAN:  79 year old woman who presented with epigastric pain for 3 days prior to arrival.  Workup suggests gallstone pancreatitis without signs of acute cholecystitis.  There is no obstructing stone on recent MRCP.  She meets indications to offer same admission cholecystectomy but she is an incredibly high risk for surgical complications due to her prior abdominal catastrophe last year.  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  She had dense intraabdominal adhesions at that time.  This adhesive burden is likely even worse after delayed abdominal closure last year.  She will almost certainly require open cholecystectomy and is at a high risk for technical complications during surgery as well as post-operative complications during the convalescent period.  I have discussed this case with multiple surgeons and we are in agreement that the risk: benefit profile favors not performing elective prophylactic cholecystectomy.  From my perspective she can continue to advance diet and is stable for discharge when tolerating PO and pain is controlled with oral medications alone.  She can follow up [facility] to continue ongoing discussions about elective cholecystectomy if she desires.  Patient was extensively counseled today about risks associated with surgery and watchful waiting.  She states understanding and strongly prefers watchful waiting.           ____________________________________     Parmjit Wilder M.D.    DD: 9/27/2024  8:13 AM

## 2024-09-28 NOTE — CARE PLAN
The patient is Stable - Low risk of patient condition declining or worsening    Shift Goals  Clinical Goals: Pain management 3/10 or less throughout shift, drink one clear ensure per meal  Patient Goals: pain managment  Family Goals: AMELIA    Progress made toward(s) clinical / shift goals:      Pt pain managed at a 2/10 throughout shift, pt drinking multiple clear ensures throughout shift. Pt ambulated in payne independently.     Patient is not progressing towards the following goals:

## 2024-09-28 NOTE — PROGRESS NOTES
Hospital Medicine Daily Progress Note    Date of Service  9/28/2024    Chief Complaint  Rubi Haq is a 79 y.o. female admitted 9/26/2024 with abdominal pain.    Hospital Course  Rubi Haq is an 79-year-old female with PMHx lumbar fusion, discectomy, bowel resection 2023, HLD and hypothyroidism.  Admitted 9/26 for choledocholithiasis.    RUQ US: Distended gallbladder with positive Ornelas sign. CT A/P: Distended gallbladder with questionable gallbladder wall thickening and dilation of the CBD.    General surgery has been consulted.  Patient is NPO.    Interval Problem Update  9/27: Vital stable overnight.  WBC 6.1 from 9.  Hb 8.6 from 9.9.  Significant improvement in LFTs overnight.  Bilirubin 0.7 from 1.  Lipase 135 from 2178.  Continue clear liquid diet at this time.  No cholecystectomy planned at this time.  Close monitoring for return of pain and elevation in LFTs.    9/28: Vitals stable overnight.  Patient is afebrile.  WBC 6.2.  Hb stable at 8.6.  LFTs continue to downtrend.  Bilirubin 0.3 from 0.7.  Magnesium 1.5; replaced.  Potassium 3.3; replaced.  Tolerating clear liquid diet. Advance to GI soft diet today. No surgery planned at this time.     I have discussed this patient's plan of care and discharge plan at IDT rounds today with Case Management, Nursing, Nursing leadership, and other members of the IDT team.    Consultants/Specialty  general surgery and GI    Code Status  DNAR/DNI    Disposition  Medically Cleared  I have placed the appropriate orders for post-discharge needs.    Review of Systems  Review of Systems   Constitutional:  Negative for fever and malaise/fatigue.   Respiratory:  Negative for shortness of breath.    Cardiovascular:  Negative for chest pain and leg swelling.   Gastrointestinal:  Positive for abdominal pain and nausea. Negative for vomiting.        Physical Exam  Temp:  [36.7 °C (98 °F)-37.2 °C (99 °F)] 37.2 °C (99 °F)  Pulse:  [72-79] 79  Resp:  [17-18] 18  BP:  (108-135)/(59-76) 108/76  SpO2:  [90 %-95 %] 93 %    Physical Exam  Vitals and nursing note reviewed.   Constitutional:       General: She is not in acute distress.     Appearance: Normal appearance. She is not ill-appearing.   Cardiovascular:      Rate and Rhythm: Normal rate and regular rhythm.   Pulmonary:      Effort: Pulmonary effort is normal.      Breath sounds: Normal breath sounds.   Abdominal:      General: Abdomen is flat. There is no distension.      Palpations: Abdomen is soft.      Tenderness: There is abdominal tenderness.   Skin:     General: Skin is warm and dry.   Neurological:      Mental Status: She is alert and oriented to person, place, and time.   Psychiatric:         Mood and Affect: Mood normal.         Behavior: Behavior normal.         Fluids    Intake/Output Summary (Last 24 hours) at 9/28/2024 1641  Last data filed at 9/28/2024 1300  Gross per 24 hour   Intake 1753.33 ml   Output 350 ml   Net 1403.33 ml        Laboratory  Recent Labs     09/26/24  0109 09/27/24  0044 09/28/24  0041   WBC 9.0 6.1 6.2   RBC 3.20* 2.79* 2.77*   HEMOGLOBIN 9.9* 8.6* 8.6*   HEMATOCRIT 31.1* 26.9* 26.9*   MCV 97.2 96.4 97.1   MCH 30.9 30.8 31.0   MCHC 31.8* 32.0* 32.0*   RDW 45.7 46.4 44.9   PLATELETCT 213 170 191   MPV 9.7 9.6 10.1     Recent Labs     09/26/24  0109 09/27/24  0044 09/28/24  0041   SODIUM 141 140 142   POTASSIUM 3.6 3.5* 3.3*   CHLORIDE 103 107 107   CO2 23 21 23   GLUCOSE 109* 94 125*   BUN 22 14 8   CREATININE 0.89 0.84 0.76   CALCIUM 9.1 8.5 8.6                   Imaging  [id number]   Final Result      1.  Negative for common bile duct stones      2.  Biliary dilation with common bile duct measuring 10 mm      3.  Hepatomegaly      4.  Postoperative changes at L4-5      OUTSIDE IMAGES-US ABDOMEN   Final Result           Assessment/Plan  * Transaminitis  Assessment & Plan  Significant elevation of LFTs on admission.  Secondary to choledocholithiasis.  MRCP: Negative for common bile duct  stones.  Biliary dilation with CBD measuring 10 mm.  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  - Continue clear liquid diet; do not advance to GI soft at this time  - Close monitoring for return of pain or new increase in transaminitis  - Continue IV Zosyn  - Continue conservative management  - Repeat CMP in a.m.    Bacteremia  Assessment & Plan  Blood cultures from 9/26 positive for Klebsiella  - Discussed with ID pharmacy  - Continue IV ceftriaxone at this time  - Continue to follow sensitivities; will need a total of 7-day course if source control can be confirmed    ACP (advance care planning)  Assessment & Plan  16 minutes spent discussing goals of care with patient.  She had never discussed CODE STATUS in the past.  I explained to her the processes of chest compressions and intubation.  She states that she would like to be DNR/DNI, okay for medical treatment.    Pancreatitis, unspecified pancreatitis type- (present on admission)  Assessment & Plan  Likely secondary from passed choledocholithiasis  Lipase greater than 2000 on admission; improved to 135   - Advance to GI soft diet   - Continue to monitor  - Continue p.o. oxycodone for mild to moderate pain  - Continue IV Dilaudid for severe breakthrough pain; patient has not required IV dosing in the last 24 hours    Anemia- (present on admission)  Assessment & Plan  Hb 8.6.  Baseline appears to between 7 and 9  - Repeat CBC in a.m.    History of DVT (deep vein thrombosis)- (present on admission)  Assessment & Plan  Patient has a history of DVT.  Holding DVT prophylaxis given anemia and surgical planning  - Close monitoring for lower extremity swelling  - Encourage ambulation as able  - SCDS    HLD (hyperlipidemia)- (present on admission)  Assessment & Plan  Holding home atorvastatin  due to transaminitis    Hypothyroid- (present on admission)  Assessment & Plan  TSH 4.9  - Continue levothyroxine           VTE prophylaxis: VTE Selection    I have performed a physical exam and reviewed and updated ROS and Plan today (9/28/2024). In review of yesterday's note (9/27/2024), there are no changes except as documented above.

## 2024-09-28 NOTE — PROGRESS NOTES
Anisa from Lab called with critical result of positive BCX with Klebsiella variicola bacteria at 1020. Critical lab result read back to Anisa.  Dr. Martinez notified of critical lab result at 1024.

## 2024-09-28 NOTE — CARE PLAN
The patient is Stable - Low risk of patient condition declining or worsening    Shift Goals  Clinical Goals: pain, abx, fluids, ambulation, advance diet  Patient Goals: shower, advance diet  Family Goals: AMELIA    Progress made toward(s) clinical / shift goals:  Patient will ambulate the halls 3 times during course of shift.    Problem: Knowledge Deficit - Standard  Goal: Patient and family/care givers will demonstrate understanding of plan of care, disease process/condition, diagnostic tests and medications  9/28/2024 1501 by Nela Seymour R.N.  Outcome: Progressing    Patient is Aox4 and verbalizes understanding of care and medication they are receiving. Will continue to educate patient on plan of care    Problem: Mobility  Goal: Patient's capacity to carry out activities will improve  Outcome: Progressing    Patient took shower today and walked halls twice. Patient is up self to the bathroom. Will continue to encourage patient to mobilize frequently.     Problem: Nutrition  Goal: Patient's nutritional and fluid intake will be adequate or improve  Outcome: Progressing    Patient diet advance to GI soft, patient tolerating well. Will continue to monitor nutritional intake and advance as tolerated.      Problem: Pain - Standard  Goal: Alleviation of pain or a reduction in pain to the patient’s comfort goal  Outcome: Progressing    Patient pain 2/10 during course of shift. Will continue to monitor pain and intervene as needed.

## 2024-09-28 NOTE — PROGRESS NOTES
Patient report received and assumed care. Assessed patient at 0715. Patient is Aox4 and reports 2/10 pain, patient given heat packs. Patient running LR at 100 through peripheral IV. Patient has bilateral generalized edema of the lower extremities. Patient is on a full liquid diet. Patient bed is in low, locked position with bed alarm off. Patient is up self to bathroom and stable. Standard precautions in place. Patient personal belongings and call giht are with in reach. Patient educated to use call light when needed.

## 2024-09-29 ENCOUNTER — PHARMACY VISIT (OUTPATIENT)
Dept: PHARMACY | Facility: MEDICAL CENTER | Age: 79
End: 2024-09-29
Payer: COMMERCIAL

## 2024-09-29 VITALS
SYSTOLIC BLOOD PRESSURE: 113 MMHG | BODY MASS INDEX: 21.6 KG/M2 | OXYGEN SATURATION: 91 % | DIASTOLIC BLOOD PRESSURE: 48 MMHG | HEART RATE: 72 BPM | WEIGHT: 126.54 LBS | TEMPERATURE: 97 F | HEIGHT: 64 IN | RESPIRATION RATE: 17 BRPM

## 2024-09-29 LAB
ALBUMIN SERPL BCP-MCNC: 3 G/DL (ref 3.2–4.9)
ALBUMIN/GLOB SERPL: 1.2 G/DL
ALP SERPL-CCNC: 672 U/L (ref 30–99)
ALT SERPL-CCNC: 226 U/L (ref 2–50)
ANION GAP SERPL CALC-SCNC: 11 MMOL/L (ref 7–16)
AST SERPL-CCNC: 72 U/L (ref 12–45)
BILIRUB SERPL-MCNC: 0.3 MG/DL (ref 0.1–1.5)
BUN SERPL-MCNC: 14 MG/DL (ref 8–22)
CALCIUM ALBUM COR SERPL-MCNC: 9.7 MG/DL (ref 8.5–10.5)
CALCIUM SERPL-MCNC: 8.9 MG/DL (ref 8.5–10.5)
CHLORIDE SERPL-SCNC: 106 MMOL/L (ref 96–112)
CO2 SERPL-SCNC: 23 MMOL/L (ref 20–33)
CREAT SERPL-MCNC: 0.82 MG/DL (ref 0.5–1.4)
GFR SERPLBLD CREATININE-BSD FMLA CKD-EPI: 72 ML/MIN/1.73 M 2
GLOBULIN SER CALC-MCNC: 2.5 G/DL (ref 1.9–3.5)
GLUCOSE SERPL-MCNC: 119 MG/DL (ref 65–99)
POTASSIUM SERPL-SCNC: 4.1 MMOL/L (ref 3.6–5.5)
PROT SERPL-MCNC: 5.5 G/DL (ref 6–8.2)
SODIUM SERPL-SCNC: 140 MMOL/L (ref 135–145)

## 2024-09-29 PROCEDURE — RXMED WILLOW AMBULATORY MEDICATION CHARGE: Performed by: STUDENT IN AN ORGANIZED HEALTH CARE EDUCATION/TRAINING PROGRAM

## 2024-09-29 PROCEDURE — A9270 NON-COVERED ITEM OR SERVICE: HCPCS | Performed by: STUDENT IN AN ORGANIZED HEALTH CARE EDUCATION/TRAINING PROGRAM

## 2024-09-29 PROCEDURE — 99239 HOSP IP/OBS DSCHRG MGMT >30: CPT | Performed by: STUDENT IN AN ORGANIZED HEALTH CARE EDUCATION/TRAINING PROGRAM

## 2024-09-29 PROCEDURE — 80053 COMPREHEN METABOLIC PANEL: CPT

## 2024-09-29 PROCEDURE — 36415 COLL VENOUS BLD VENIPUNCTURE: CPT

## 2024-09-29 PROCEDURE — 700102 HCHG RX REV CODE 250 W/ 637 OVERRIDE(OP): Performed by: STUDENT IN AN ORGANIZED HEALTH CARE EDUCATION/TRAINING PROGRAM

## 2024-09-29 RX ORDER — CEPHALEXIN 500 MG/1
1000 CAPSULE ORAL 3 TIMES DAILY
Qty: 30 CAPSULE | Refills: 0 | Status: ACTIVE | OUTPATIENT
Start: 2024-09-29 | End: 2024-10-04

## 2024-09-29 RX ADMIN — LEVOTHYROXINE SODIUM 25 MCG: 0.03 TABLET ORAL at 05:26

## 2024-09-29 RX ADMIN — GABAPENTIN 300 MG: 300 CAPSULE ORAL at 12:08

## 2024-09-29 RX ADMIN — GABAPENTIN 300 MG: 300 CAPSULE ORAL at 05:26

## 2024-09-29 ASSESSMENT — PAIN DESCRIPTION - PAIN TYPE
TYPE: ACUTE PAIN
TYPE: ACUTE PAIN

## 2024-09-29 NOTE — PROGRESS NOTES
IV pulled, meds to beds picked up and delivered to patient and spouse at bedside. Discharge education and AVS discussed and signed. Verified no home medications to  to return to patient. Pt discharged home with family in stable condition.

## 2024-09-29 NOTE — CARE PLAN
The patient is Stable - Low risk of patient condition declining or worsening    Shift Goals  Clinical Goals: Comfort  Patient Goals: Discharge, comfort  Family Goals: AMELIA    Progress made toward(s) clinical / shift goals:  Patient educated on plan of care and verbalized understanding. Patient has remained free of falls this shift with appropriate fall precautions in place thorughout shift. Patient skin integrity maintained throughout shift.       Problem: Knowledge Deficit - Standard  Goal: Patient and family/care givers will demonstrate understanding of plan of care, disease process/condition, diagnostic tests and medications  Outcome: Progressing     Problem: Fall Risk  Goal: Patient will remain free from falls  Outcome: Progressing     Problem: Pain - Standard  Goal: Alleviation of pain or a reduction in pain to the patient’s comfort goal  Outcome: Progressing     Patient is not progressing towards the following goals:

## 2024-09-29 NOTE — PROGRESS NOTES
Pt A&Ox4. Tolerating diet well. Pt needs identified and addressed. Fall precautions; call light w/ reach. No s/s of distress. Pain well managed per pt report.

## 2024-09-29 NOTE — CARE PLAN
Problem: Knowledge Deficit - Standard  Goal: Patient and family/care givers will demonstrate understanding of plan of care, disease process/condition, diagnostic tests and medications  Description: Target End Date:  1-3 days or as soon as patient condition allows    Document in Patient Education    1.  Patient and family/caregiver oriented to unit, equipment, visitation policy and means for communicating concern  2.  Complete/review Learning Assessment  3.  Assess knowledge level of disease process/condition, treatment plan, diagnostic tests and medications  4.  Explain disease process/condition, treatment plan, diagnostic tests and medications  Outcome: Progressing     Problem: Fall Risk  Goal: Patient will remain free from falls  Description: Target End Date:  Prior to discharge or change in level of care    Document interventions on the [facility] Fall Risk Assessment    1.  Assess for fall risk factors  2.  Implement fall precautions  Outcome: Progressing     Problem: Pain - Standard  Goal: Alleviation of pain or a reduction in pain to the patient’s comfort goal  Description: Target End Date:  Prior to discharge or change in level of care    Document on Vitals flowsheet    1.  Document pain using the appropriate pain scale per order or unit policy  2.  Educate and implement non-pharmacologic comfort measures (i.e. relaxation, distraction, massage, cold/heat therapy, etc.)  3.  Pain management medications as ordered  4.  Reassess pain after pain med administration per policy  5.  If opiods administered assess patient's response to pain medication is appropriate per POSS sedation scale  6.  Follow pain management plan developed in collaboration with patient and interdisciplinary team (including palliative care or pain specialists if applicable)  Outcome: Progressing   The patient is Stable - Low risk of patient condition declining or worsening    Shift Goals  Clinical Goals: pain, abx, fluids, ambulation,  advance diet  Patient Goals: shower, advance diet  Family Goals: AMELIA    Progress made toward(s) clinical / shift goals:      Patient is not progressing towards the following goals:

## 2024-09-29 NOTE — PROGRESS NOTES
Assumed care of pt  at 0700. Report received from NOC RN. Pt is A&O x 4. Patient stated that their pain is 0/10. Pt's pain comfort goal is 0/10. Pt is on RA, no signs of respiratory distress at this time.   Pt educated on how to use the call light, pt verbalized understanding. Bed in lowest locked position, call light within reach, hourly rounding in place. Labs reviewed, orders reviewed, communication board updated. Pt declines any further needs at this time

## 2024-09-29 NOTE — DISCHARGE SUMMARY
Discharge Summary    CHIEF COMPLAINT ON ADMISSION  No chief complaint on file.      Reason for Admission  pancreatitis     Admission Date  9/26/2024    CODE STATUS  DNAR/DNI    HPI & HOSPITAL COURSE    Rubi Haq is an 79-year-old female with PMHx lumbar fusion, discectomy, bowel resection 2023, HLD and hypothyroidism.  Admitted 9/26 for choledocholithiasis.    RUQ US: Distended gallbladder with positive Ornelas sign. CT A/P: Distended gallbladder with questionable gallbladder wall thickening and dilation of the CBD.    Patient additionally had a lipase of greater than 2000-indicating gallstone pancreatitis.  Her initial LFTs were significantly elevated though her bilirubin remained normal throughout the duration of her hospitalization.  LFTs were downtrending at time of discharge.    General surgery has been consulted. 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  Case was discussed with general surgery and colleagues-and recommended watchful waiting in terms of cholecystectomy.  Should patient have a repeat episode of choledocholithiasis, she would need a general surgery consult for possible cholecystectomy.    At time of discharge patient's LFTs and lipase have trended down.  She is tolerating a GI soft diet without return of pain.  She will discharge home with her son to [facility].  She should have close outpatient follow-up moving forward.  I would recommend a repeat CMP in approximately 1 week to ensure that her LFTs continue to downtrend.    Therefore, she is discharged in good and stable condition to home with close outpatient follow-up.    The patient met 2-midnight criteria for an inpatient stay at the time of discharge.    Discharge Date  9/29/2024    FOLLOW UP ITEMS POST DISCHARGE  Follow up with PCP 1 week    DISCHARGE DIAGNOSES  Principal Problem:    Transaminitis  (POA: Unknown)  Active Problems:    Hypothyroid (POA: Yes)    HLD (hyperlipidemia) (POA: Yes)    History of DVT (deep vein thrombosis) (POA: Yes)    Anemia (POA: Yes)    Pancreatitis, unspecified pancreatitis type (POA: Yes)    ACP (advance care planning) (POA: Unknown)    Bacteremia (POA: Unknown)  Resolved Problems:    * No resolved hospital problems. *      FOLLOW UP  No future appointments.  No follow-up provider specified.    MEDICATIONS ON DISCHARGE     Medication List        START taking these medications        Instructions   cephALEXin 500 MG Caps  Commonly known as: Keflex   Take 2 Capsules by mouth 3 times a day for 5 days.  Dose: 1,000 mg            CONTINUE taking these medications        Instructions   acyclovir 200 MG Caps  Commonly known as: Zovirax   Take 800 mg by mouth 2 times a day.  Dose: 800 mg     ferrous sulfate 325 (65 Fe) MG tablet   Take 325 mg by mouth every day.  Dose: 325 mg     gabapentin 300 MG Caps  Commonly known as: Neurontin   Take 300 mg by mouth 4 times a day.  Dose: 300 mg     levothyroxine 25 MCG Tabs  Commonly known as: Synthroid   Take 25 mcg by mouth every evening.  Dose: 25 mcg     naproxen 500 MG Tabs  Commonly known as: Naprosyn   Take 500 mg by mouth every evening. nightly  Dose: 500 mg     therapeutic multivitamin-minerals Tabs   Take 1 Tab by mouth every day.  Dose: 1 Tablet     Vitamin D3 2000 UNIT Caps   Take 1 Cap by mouth every day.  Dose: 1 Capsule            STOP taking these medications      acetaminophen 500 MG Tabs  Commonly known as: Tylenol     atorvastatin 20 MG Tabs  Commonly known as: Lipitor              Allergies  Allergies   Allergen Reactions    Nickel Rash     Rash from thierno    Tramadol      With drawl like symptom after stopped taking       DIET  Orders Placed This Encounter   Procedures    Diet Order Diet: Low Fiber(GI Soft)     Standing Status:   Standing     Number of Occurrences:   1     Order Specific Question:   Diet:     Answer:   Low  Fiber(GI Soft) [2]       ACTIVITY  As tolerated.  Weight bearing as tolerated    CONSULTATIONS  General Surgery  Gastroenterology    PROCEDURES  None    LABORATORY  Lab Results   Component Value Date    SODIUM 140 09/29/2024    POTASSIUM 4.1 09/29/2024    CHLORIDE 106 09/29/2024    CO2 23 09/29/2024    GLUCOSE 119 (H) 09/29/2024    BUN 14 09/29/2024    CREATININE 0.82 09/29/2024    CREATININE 0.7 02/25/2007        Lab Results   Component Value Date    WBC 6.2 09/28/2024    HEMOGLOBIN 8.6 (L) 09/28/2024    HEMATOCRIT 26.9 (L) 09/28/2024    PLATELETCT 191 09/28/2024      [id number]   Final Result      1.  Negative for common bile duct stones      2.  Biliary dilation with common bile duct measuring 10 mm      3.  Hepatomegaly      4.  Postoperative changes at L4-5      OUTSIDE IMAGES-US ABDOMEN   Final Result            Total time of the discharge process exceeds 49 minutes.

## 2024-09-30 LAB
BACTERIA BLD CULT: ABNORMAL
BACTERIA BLD CULT: ABNORMAL
SIGNIFICANT IND 70042: ABNORMAL
SITE SITE: ABNORMAL
SOURCE SOURCE: ABNORMAL

## (undated) DEVICE — TRAY CATHETER FOLEY URINE METER W/STATLOCK 350ML (10EA/CA)

## (undated) DEVICE — KIT ANESTHESIA W/CIRCUIT & 3/LT BAG W/FILTER (20EA/CA)

## (undated) DEVICE — SUTURE GENERAL

## (undated) DEVICE — SUTURE 0 PDS CT-2 (36PK/BX)

## (undated) DEVICE — VAC CANISTER W/GEL 500ML - FITS NEW MACHINES (10EA/CA)

## (undated) DEVICE — SODIUM CHL IRRIGATION 0.9% 1000ML (12EA/CA)

## (undated) DEVICE — GOWN WARMING STANDARD FLEX - (30/CA)

## (undated) DEVICE — GLOVE BIOGEL INDICATOR SZ 6.5 SURGICAL PF LTX - (50PR/BX 4BX/CA)

## (undated) DEVICE — DRAPE LAPAROTOMY T SHEET - (12EA/CA)

## (undated) DEVICE — STAPLER 75MM LINEAR OPEN (3EA/BX)

## (undated) DEVICE — CANISTER SUCTION 3000ML MECHANICAL FILTER AUTO SHUTOFF MEDI-VAC NONSTERILE LF DISP  (40EA/CA)

## (undated) DEVICE — SPONGE GAUZESTER 4 X 4 4PLY - (128PK/CA)

## (undated) DEVICE — CORETEMP DRAPE FORM-FITTED EASY DROPANDGO DRAPE FOR USE ON THE CORETEMP FLUID MANAGEMENT 56IN X 56IN

## (undated) DEVICE — STAPLE 45MM BLUE 3.5MM ECHELON (12EA/BX)

## (undated) DEVICE — KIT EVACUATER 3 SPRING PVC LF 1/8 DRAIN SIZE (10EA/CA)"

## (undated) DEVICE — SUTURE 3-0 VICRYL PLUS SH - 8X 18 INCH (12/BX)

## (undated) DEVICE — DRAPE CLEAR W/ELASTIC BAND RAD CARM 40 X40" (20EA/CA)"

## (undated) DEVICE — GOWN SURGICAL XX-LARGE - (28EA/CA) SIRUS NON REINFORCED

## (undated) DEVICE — LACTATED RINGERS INJ 1000 ML - (14EA/CA 60CA/PF)

## (undated) DEVICE — CHLORAPREP 26 ML APPLICATOR - ORANGE TINT(25/CA)

## (undated) DEVICE — ELECTRODE DUAL RETURN W/ CORD - (50/PK)

## (undated) DEVICE — SUTURE 0 VICRYL PLUS CT-2 - 8 X 18 INCH (12/BX)

## (undated) DEVICE — HEADREST PRONEVIEW LARGE - (10/CA)

## (undated) DEVICE — GLOVE BIOGEL SZ 8 SURGICAL PF LTX - (50PR/BX 4BX/CA)

## (undated) DEVICE — SPONGE GAUZESTER. 2X2 4-PL - (2/PK 50PK/BX 30BX/CS)

## (undated) DEVICE — GLOVE BIOGEL SZ 8.5 SURGICAL PF LTX - (50PR/BX 4BX/CA)

## (undated) DEVICE — GLOVE BIOGEL PI INDICATOR SZ 6.5 SURGICAL PF LF - (50/BX 4BX/CA)

## (undated) DEVICE — GOWN SURGICAL X-LARGE ULTRA - FILM-REINFORCED (20/CA)

## (undated) DEVICE — ARMREST CRADLE FOAM - (2PR/PK 12PR/CA)

## (undated) DEVICE — DRESSING TRANSPARENT FILM TEGADERM 4 X 4.75" (50EA/BX)"

## (undated) DEVICE — STAPLE 45MM WHTE 2.5MM - (12/BX)

## (undated) DEVICE — COVER LIGHT HANDLE ALC PLUS DISP (18EA/BX)

## (undated) DEVICE — DRAPE MAYO STAND - (30/CA)

## (undated) DEVICE — PACK MINOR BASIN - (2EA/CA)

## (undated) DEVICE — SLEEVE, VASO, THIGH, MED

## (undated) DEVICE — SENSOR OXIMETER ADULT SPO2 RD SET (20EA/BX)

## (undated) DEVICE — TOWEL STOP TIMEOUT SAFETY FLAG (40EA/CA)

## (undated) DEVICE — PACK JACKSON TABLE KIT W/OUT - HR (6EA/CA)

## (undated) DEVICE — GLOVE BIOGEL PI ORTHO SZ 6 SURGICAL PF LF (40PR/BX)

## (undated) DEVICE — TUBING CLEARLINK DUO-VENT - C-FLO (48EA/CA)

## (undated) DEVICE — MASK ANESTHESIA ADULT  - (100/CA)

## (undated) DEVICE — TUBE E-T HI-LO CUFF 6.5MM (10EA/BX)

## (undated) DEVICE — SUTURE 1 VICRYL PLUS CTX - 8 X 18 INCH (12/BX)

## (undated) DEVICE — GLOVE BIOGEL PI INDICATOR SZ 7.0 SURGICAL PF LF - (50/BX 4BX/CA)

## (undated) DEVICE — PACK NEURO - (2EA/CA)

## (undated) DEVICE — NEPTUNE 4 PORT MANIFOLD - (20/PK)

## (undated) DEVICE — SOD. CHL. INJ. 0.9% 1000 ML - (14EA/CA 60CA/PF)

## (undated) DEVICE — SUCTION INSTRUMENT YANKAUER BULBOUS TIP W/O VENT (50EA/CA)

## (undated) DEVICE — SUTURE 2-0 COATED VICRYL PLUS - 12 X 18 INCH (12/BX)

## (undated) DEVICE — KIT ROOM DECONTAMINATION

## (undated) DEVICE — KIT GEL-FLOW NT ABORBABLE GELATIN (6EA/BX)

## (undated) DEVICE — CORDS BIPOLAR COAGULATION - 12FT STERILE DISP. (10EA/BX)

## (undated) DEVICE — GLOVE BIOGEL PI ORTHO SZ 6 1/2 SURGICAL PF LF (40PR/BX)

## (undated) DEVICE — GLOVE BIOGEL PI ORTHO SZ 8 PF LF (40PR/BX)

## (undated) DEVICE — PEN SKIN MARKER W/RULER - (50EA/BX)

## (undated) DEVICE — GLOVE BIOGEL INDICATOR SZ 7.5 SURGICAL PF LTX - (50PR/BX 4BX/CA)

## (undated) DEVICE — SUTURE 2-0 VICRYL PLUS SH - 27 INCH (36/BX)

## (undated) DEVICE — STAPLER SKIN DISP - (6/BX 10BX/CA) VISISTAT

## (undated) DEVICE — SET EXTENSION WITH 2 PORTS (48EA/CA) ***PART #2C8610 IS A SUBSTITUTE*****

## (undated) DEVICE — DRESSING VAC SIMPLACE MED - (10EA/CA)

## (undated) DEVICE — PACK MAJOR BASIN - (2EA/CA)

## (undated) DEVICE — BOVIE BLADE COATED &INSULATED - 25/PK

## (undated) DEVICE — SET LEADWIRE 5 LEAD BEDSIDE DISPOSABLE ECG (1SET OF 5/EA)

## (undated) DEVICE — DERMABOND ADVANCED - (12EA/BX)

## (undated) DEVICE — SENSOR SPO2 NEO LNCS ADHESIVE (20/BX) SEE USER NOTES

## (undated) DEVICE — DRAPE STRLE REG TOWEL 18X24 - (10/BX 4BX/CA)"

## (undated) DEVICE — GLOVE BIOGEL SZ 7.5 SURGICAL PF LTX - (50PR/BX 4BX/CA)

## (undated) DEVICE — MIDAS LUBRICATOR DIFFUSER PACK (4EA/CA)

## (undated) DEVICE — SUTURE 0 PDS-2 CTX 36 INCH - (24/BX)

## (undated) DEVICE — BOVIE  BLADE 6 EXTENDED - (50/PK)

## (undated) DEVICE — BONE MILL BM210

## (undated) DEVICE — PROTECTOR ULNA NERVE - (36PR/CA)

## (undated) DEVICE — SYRINGE ASEPTO - (50EA/CA

## (undated) DEVICE — ELECTRODE 850 FOAM ADHESIVE - HYDROGEL RADIOTRNSPRNT (50/PK)

## (undated) DEVICE — GLOVE BIOGEL SZ 7 SURGICAL PF LTX - (50PR/BX 4BX/CA)

## (undated) DEVICE — SUTURE 4-0 MONOCRYL PLUS PS-2 - 27 INCH (36/BX)

## (undated) DEVICE — TOOL DISSECT MATCH HEAD

## (undated) DEVICE — SPONGE LAP XR ST 36X36 LF - (1/PK40PK/CA)

## (undated) DEVICE — NEEDLE FILTER ASPIRATION 18 GA X 1 1/2 IN (100EA/BX)